# Patient Record
Sex: FEMALE | Employment: OTHER | URBAN - METROPOLITAN AREA
[De-identification: names, ages, dates, MRNs, and addresses within clinical notes are randomized per-mention and may not be internally consistent; named-entity substitution may affect disease eponyms.]

---

## 2023-05-24 ENCOUNTER — ANNUAL EXAM (OUTPATIENT)
Dept: OBGYN CLINIC | Facility: CLINIC | Age: 68
End: 2023-05-24

## 2023-05-24 VITALS
HEIGHT: 64 IN | WEIGHT: 245 LBS | BODY MASS INDEX: 41.83 KG/M2 | SYSTOLIC BLOOD PRESSURE: 138 MMHG | DIASTOLIC BLOOD PRESSURE: 76 MMHG

## 2023-05-24 DIAGNOSIS — Z01.419 WOMEN'S ANNUAL ROUTINE GYNECOLOGICAL EXAMINATION: Primary | ICD-10-CM

## 2023-05-24 DIAGNOSIS — Z78.0 MENOPAUSE: ICD-10-CM

## 2023-05-24 RX ORDER — AMLODIPINE BESYLATE 5 MG/1
5 TABLET ORAL 2 TIMES DAILY
COMMUNITY

## 2023-05-24 RX ORDER — MELOXICAM 15 MG/1
15 TABLET ORAL DAILY
COMMUNITY
Start: 2023-05-18

## 2023-05-24 RX ORDER — ROSUVASTATIN CALCIUM 10 MG/1
1 TABLET, COATED ORAL DAILY
COMMUNITY
Start: 2023-01-03

## 2023-05-24 RX ORDER — LOSARTAN POTASSIUM AND HYDROCHLOROTHIAZIDE 12.5; 1 MG/1; MG/1
1 TABLET ORAL DAILY
COMMUNITY

## 2023-05-24 NOTE — PROGRESS NOTES
"Herbie Moffett is a 69 y o  female who presents for annual well woman exam      Move from [address] [place] [address]      History of abnormal Pap smear: no  Family history of uterine or ovarian cancer: yes - PGM uterine    Family history of breast cancer: yes - SISTER    Menstrual History:  OB History        0    Para   0    Term   0       0    AB   0    Living   0       SAB   0    IAB   0    Ectopic   0    Multiple   0    Live Births   0                  No LMP recorded  Patient is postmenopausal        The following portions of the patient's history were reviewed and updated as appropriate: allergies, current medications, past family history, past medical history, past social history, past surgical history and problem list     Review of Systems  Review of Systems   Constitutional: Negative for activity change, appetite change, chills, fatigue and fever  Respiratory: Negative for apnea, cough, chest tightness and shortness of breath  Cardiovascular: Negative for chest pain, palpitations and leg swelling  Gastrointestinal: Negative for abdominal pain, constipation, diarrhea, nausea and vomiting  Genitourinary: Negative for difficulty urinating, dysuria, flank pain, frequency, hematuria and urgency  Mild ANASTASIIA   Neurological: Negative for dizziness, seizures, syncope, light-headedness, numbness and headaches  Psychiatric/Behavioral: Negative for agitation and confusion        Bladder diet and Kegel exercises discussed    Objective      /76 (BP Location: Left arm, Patient Position: Sitting, Cuff Size: Adult)   Ht 5' 4\" (1 626 m)   Wt 111 kg (245 lb)   BMI 42 05 kg/m²     Physical Exam  OBGyn Exam     General:   alert and oriented, in no acute distress, alert, appears stated age and cooperative   Heart: regular rate and rhythm, S1, S2 normal, no murmur, click, rub or gallop and murmur on exam    Lungs: clear to auscultation bilaterally   Abdomen: soft, non-tender, without masses or " organomegaly   Vulva: normal   Vagina: normal mucosa, normal discharge   Cervix: no cervical motion tenderness and no lesions   Uterus: normal size   Adnexa:  Breast Exam:  normal adnexa  breasts appear normal, no suspicious masses, no skin or nipple changes or axillary nodes  Assessment      @well woman@   78-year-old female  Annual exam  Mild ANASTASIIA   CHTN   PRE DM  Benign thyroid nodule  Sister had breast CA  To be tested fro BRCA  Plan   Pap/HPV  Diet/exercise  Calcium/vitamin D  Mammogram  Due for colonoscopy  Bone scan DEXA  Return to office for annual exam   All questions answered  There are no Patient Instructions on file for this visit

## 2023-06-08 ENCOUNTER — NEW PATIENT (OUTPATIENT)
Dept: URBAN - METROPOLITAN AREA CLINIC 6 | Facility: CLINIC | Age: 68
End: 2023-06-08

## 2023-06-08 DIAGNOSIS — H25.812: ICD-10-CM

## 2023-06-08 DIAGNOSIS — Z83.518: ICD-10-CM

## 2023-06-08 DIAGNOSIS — H40.013: ICD-10-CM

## 2023-06-08 DIAGNOSIS — E11.9: ICD-10-CM

## 2023-06-08 DIAGNOSIS — Z96.1: ICD-10-CM

## 2023-06-08 PROCEDURE — 99204 OFFICE O/P NEW MOD 45 MIN: CPT

## 2023-06-08 ASSESSMENT — TONOMETRY
OD_IOP_MMHG: 12
OS_IOP_MMHG: 12

## 2023-06-08 ASSESSMENT — VISUAL ACUITY
OS_PH: 20/30-1
OD_CC: 20/30-1
OU_CC: J1+
OS_CC: 20/70

## 2023-07-27 ENCOUNTER — FOLLOW UP (OUTPATIENT)
Dept: URBAN - METROPOLITAN AREA CLINIC 6 | Facility: CLINIC | Age: 68
End: 2023-07-27

## 2023-07-27 DIAGNOSIS — Z96.1: ICD-10-CM

## 2023-07-27 DIAGNOSIS — H25.812: ICD-10-CM

## 2023-07-27 DIAGNOSIS — H40.013: ICD-10-CM

## 2023-07-27 DIAGNOSIS — E11.9: ICD-10-CM

## 2023-07-27 DIAGNOSIS — Z83.518: ICD-10-CM

## 2023-07-27 PROCEDURE — 92133 CPTRZD OPH DX IMG PST SGM ON: CPT

## 2023-07-27 PROCEDURE — 92015 DETERMINE REFRACTIVE STATE: CPT

## 2023-07-27 PROCEDURE — 92012 INTRM OPH EXAM EST PATIENT: CPT

## 2023-07-27 ASSESSMENT — VISUAL ACUITY
OS_PH: 20/30
OD_CC: 20/25
OS_CC: 20/60

## 2023-07-27 ASSESSMENT — TONOMETRY
OD_IOP_MMHG: 8
OS_IOP_MMHG: 13

## 2024-03-08 ENCOUNTER — TELEPHONE (OUTPATIENT)
Age: 69
End: 2024-03-08

## 2024-03-08 NOTE — TELEPHONE ENCOUNTER
----- Message from Pierce Griffin MD sent at 3/8/2024 12:39 PM EST -----  Scattered density noted, overall reassuring mammogram recommend routine screening mammogram of both breast in 1 year

## 2024-04-17 ENCOUNTER — OFFICE VISIT (OUTPATIENT)
Dept: OBGYN CLINIC | Facility: CLINIC | Age: 69
End: 2024-04-17
Payer: COMMERCIAL

## 2024-04-17 ENCOUNTER — HOSPITAL ENCOUNTER (OUTPATIENT)
Dept: RADIOLOGY | Facility: HOSPITAL | Age: 69
Discharge: HOME | End: 2024-04-17
Attending: ORTHOPAEDIC SURGERY
Payer: COMMERCIAL

## 2024-04-17 VITALS
DIASTOLIC BLOOD PRESSURE: 68 MMHG | HEART RATE: 95 BPM | SYSTOLIC BLOOD PRESSURE: 127 MMHG | WEIGHT: 232 LBS | HEIGHT: 64 IN | BODY MASS INDEX: 39.61 KG/M2

## 2024-04-17 DIAGNOSIS — G89.29 CHRONIC PAIN OF LEFT KNEE: ICD-10-CM

## 2024-04-17 DIAGNOSIS — E11.69 DYSLIPIDEMIA DUE TO TYPE 2 DIABETES MELLITUS  (HCC): ICD-10-CM

## 2024-04-17 DIAGNOSIS — E78.5 DYSLIPIDEMIA DUE TO TYPE 2 DIABETES MELLITUS  (HCC): ICD-10-CM

## 2024-04-17 DIAGNOSIS — M25.562 CHRONIC PAIN OF LEFT KNEE: ICD-10-CM

## 2024-04-17 DIAGNOSIS — M17.12 PRIMARY OSTEOARTHRITIS OF LEFT KNEE: Primary | ICD-10-CM

## 2024-04-17 DIAGNOSIS — M25.562 LEFT KNEE PAIN, UNSPECIFIED CHRONICITY: ICD-10-CM

## 2024-04-17 PROCEDURE — 99204 OFFICE O/P NEW MOD 45 MIN: CPT | Performed by: ORTHOPAEDIC SURGERY

## 2024-04-17 PROCEDURE — 73562 X-RAY EXAM OF KNEE 3: CPT

## 2024-04-17 RX ORDER — CHLORHEXIDINE GLUCONATE 4 G/100ML
SOLUTION TOPICAL DAILY PRN
OUTPATIENT
Start: 2024-04-17

## 2024-04-17 RX ORDER — MULTIVIT-MIN/IRON FUM/FOLIC AC 7.5 MG-4
1 TABLET ORAL DAILY
Qty: 30 TABLET | Refills: 0 | Status: SHIPPED | OUTPATIENT
Start: 2024-04-17

## 2024-04-17 RX ORDER — ACETAMINOPHEN 325 MG/1
975 TABLET ORAL ONCE
OUTPATIENT
Start: 2024-04-17 | End: 2024-04-17

## 2024-04-17 RX ORDER — ASCORBIC ACID 500 MG
500 TABLET ORAL 2 TIMES DAILY
Qty: 60 TABLET | Refills: 1 | Status: SHIPPED | OUTPATIENT
Start: 2024-04-17

## 2024-04-17 RX ORDER — VALSARTAN AND HYDROCHLOROTHIAZIDE 320; 12.5 MG/1; MG/1
TABLET, FILM COATED ORAL
COMMUNITY
Start: 2024-04-05

## 2024-04-17 RX ORDER — SODIUM CHLORIDE, SODIUM LACTATE, POTASSIUM CHLORIDE, CALCIUM CHLORIDE 600; 310; 30; 20 MG/100ML; MG/100ML; MG/100ML; MG/100ML
125 INJECTION, SOLUTION INTRAVENOUS CONTINUOUS
OUTPATIENT
Start: 2024-04-17

## 2024-04-17 RX ORDER — FOLIC ACID 1 MG/1
1 TABLET ORAL DAILY
Qty: 30 TABLET | Refills: 1 | Status: SHIPPED | OUTPATIENT
Start: 2024-04-17

## 2024-04-17 RX ORDER — CHLORHEXIDINE GLUCONATE ORAL RINSE 1.2 MG/ML
15 SOLUTION DENTAL ONCE
OUTPATIENT
Start: 2024-04-17 | End: 2024-04-17

## 2024-04-17 RX ORDER — SEMAGLUTIDE 0.68 MG/ML
0.25 INJECTION, SOLUTION SUBCUTANEOUS
COMMUNITY
Start: 2024-02-27

## 2024-04-17 RX ORDER — FERROUS SULFATE 324(65)MG
324 TABLET, DELAYED RELEASE (ENTERIC COATED) ORAL
Qty: 60 TABLET | Refills: 1 | Status: SHIPPED | OUTPATIENT
Start: 2024-04-17

## 2024-04-17 RX ORDER — CELECOXIB 200 MG/1
200 CAPSULE ORAL DAILY
Qty: 30 CAPSULE | Refills: 1 | Status: SHIPPED | OUTPATIENT
Start: 2024-04-17

## 2024-04-17 RX ORDER — CELECOXIB 200 MG/1
200 CAPSULE ORAL DAILY PRN
Status: CANCELLED | OUTPATIENT
Start: 2024-04-17

## 2024-04-17 NOTE — PATIENT INSTRUCTIONS
"Arthritis of the Knee    Arthritis is inflammation of one or more of your joints. Pain, swelling, and stiffness are the primary symptoms of arthritis. Any joint in the body may be affected by the disease, but it is particularly common in the knee.    Knee arthritis can make it hard to do many everyday activities, such as walking or climbing stairs. It is a major cause of lost work time and a serious disability for many people.    The most common types of arthritis are osteoarthritis and rheumatoid arthritis, but there are more than 100 different forms. While arthritis is mainly an adult disease, some forms affect children.    Although there is no cure for arthritis, there are many treatment options available to help manage pain and keep people staying active.    Anatomy  The knee is the largest and strongest joint in your body. It is made up of the lower end of the femur (thighbone), the upper end of the tibia (shinbone), and the patella (kneecap). The ends of the three bones that form the knee joint are covered with articular cartilage, a smooth, slippery substance that protects and cushions the bones as you bend and straighten your knee.    Two wedge-shaped pieces of cartilage called meniscus act as \"shock absorbers\" between your thighbone and shinbone. They are tough and rubbery to help cushion the joint and keep it stable.    The knee joint is surrounded by a thin lining called the synovial membrane. This membrane releases a fluid that lubricates the cartilage and reduces friction.      Normal knee anatomy. The knee is made up of bones, cartilage, ligaments, and tendons.     Description  The major types of arthritis that affect the knee are osteoarthritis, rheumatoid arthritis, and posttraumatic arthritis.    Osteoarthritis  Osteoarthritis is the most common form of arthritis in the knee. It is a degenerative, wear-and-tear type of arthritis that occurs most often in people 50 years of age and older, although it " may occur in younger people, too.    In osteoarthritis, the cartilage in the knee joint gradually wears away. As the cartilage wears away, it becomes frayed and rough, and the protective space between the bones decreases. This can result in bone rubbing on bone and produce painful bone spurs.    Osteoarthritis usually develops slowly, and the pain it causes worsens over time.    Osteoarthritis often results in bone rubbing on bone. Bone spurs are a common feature of this form of arthritis.     Watch: Osteoarthritis of the Knee Animation    Rheumatoid Arthritis  Rheumatoid arthritis is a chronic disease that attacks multiple joints throughout the body, including the knee joint. It is symmetrical, meaning that it usually affects the same joint on both sides of the body.  In rheumatoid arthritis, the synovial membrane that covers the knee joint begins to swell. This results in knee pain and stiffness.    Rheumatoid arthritis is an autoimmune disease. This means that the immune system attacks its own tissues. The immune system damages normal tissue (such as cartilage and ligaments) and softens the bone.    Posttraumatic Arthritis  Posttraumatic arthritis is form of arthritis that develops after an injury to the knee. For example, a broken bone may damage the joint surface and lead to arthritis years after the injury. Meniscal tears and ligament injuries can cause instability and additional wear on the knee joint which, over time, can result in arthritis.    Symptoms  A knee joint affected by arthritis may be painful and inflamed. Generally, the pain develops gradually over time, although sudden onset is also possible. There are other symptoms, as well:  The joint may become stiff and swollen, making it difficult to bend and straighten the knee.  Pain and swelling may be worse in the morning, or after sitting or resting.  Vigorous activity may cause pain to flare up.  Loose fragments of cartilage and other tissue can  interfere with the smooth motion of joints. The knee may lock or stick during movement. It may creak, click, snap, or make a grinding noise (crepitus).  Pain may cause a feeling of weakness or buckling in the knee.  Many people with arthritis note increased joint pain with changes in the weather.    Doctor Examination  During your appointment, your doctor will talk with you about your symptoms and medical history, conduct a physical examination, and possibly order diagnostic tests, such as X-rays or blood tests.    Physical Examination  During the physical examination, your doctor will look for:  Joint swelling, warmth, or redness  Tenderness around the knee  Range of passive (assisted) and active (self-directed) motion  Instability of the joint  Crepitus (a grating sensation inside the joint) with movement  Pain when weight is placed on the knee  Problems with your gait (the way you walk)  Any signs of injury to the muscles, tendons, and ligaments surrounding the knee  Involvement of other joints (an indication of rheumatoid arthritis)    Imaging Tests  X-rays. These imaging tests provide detailed pictures of dense structures, such as bone. They can help distinguish among various forms of arthritis. X-rays of an arthritic knee may show a narrowing of the joint space, changes in the bone, and the formation of bone spurs (osteophytes).  Other tests. Occasionally, a magnetic resonance imaging (MRI) scan or a computerized tomography (CT) scan may be needed to determine the condition of the bone and soft tissues of your knee.      (Left) In this X-ray of a normal knee, the space between the bones indicates healthy cartilage (arrows). (Right) This X-ray of an arthritic knee shows severe loss of joint space.     Laboratory Tests  Your doctor may also recommend blood tests to determine which type of arthritis you have. With some types of arthritis, including rheumatoid arthritis, blood tests will help with a proper  "diagnosis.    Related Articles  TREATMENT  Total Knee Replacement  TREATMENT  Platelet-Rich Plasma (PRP)  RECOVERY  Activities After Total Knee Replacement  TREATMENT  Viscosupplementation Treatment for Knee Arthritis    Treatment  There is no cure for arthritis but there are a number of treatments that may help relieve the pain and disability it can cause.    Nonsurgical Treatment  As with other arthritic conditions, initial treatment of arthritis of the knee is nonsurgical. Your doctor may recommend a range of treatment options.    Lifestyle modifications. Some changes in your daily life can protect your knee joint and slow the progress of arthritis.  Minimize activities that aggravate the condition, such as climbing stairs.  Exercise is recommended for osteoarthritis to improve pain and function. Switching from high-impact activities (like jogging or tennis) to lower impact activities (like swimming or cycling) will enable you to be active while putting less stress on your knee. Balance, agility, and coordination exercises, combined with traditional exercise, may help to improve function and walking speed.  Losing weight can reduce stress on the knee joint, resulting in less pain and increased function.    Physical therapy. Specific exercises can help increase range of motion and flexibility, as well as help strengthen the muscles in your leg. Your doctor or a physical therapist can help develop an individualized exercise program that meets your needs and lifestyle.    Assistive devices. Using devices such as a cane, or wearing a brace or knee sleeve can be helpful. A brace assists with stability and function, and it may be especially helpful if the arthritis is centered on one side of the knee. There are two types of braces that are often used for knee arthritis: An \"\" brace shifts weight away from the affected portion of the knee, while a \"support\" brace helps support the entire knee load. Placing " "wedges in your shoe is not recommended for relieving knee discomfort.    Other remedies. Applying heat or ice, or wearing elastic bandages to provide support to the knee may provide some relief from pain.    Medications. Several types of drugs are useful in treating arthritis of the knee. Because people respond differently to medications, your doctor will work closely with you to determine the medications and dosages that are safe and effective for you.  Over-the-counter, non-narcotic pain relievers and anti-inflammatory medications are usually the first choice of therapy for arthritis of the knee. Like all medications, over-the-counter pain relievers can cause side effects and interact with other medications you are taking. Be sure to discuss potential side effects with your doctor.  Another type of pain reliever is a nonsteroidal anti-inflammatory drug, or NSAID (pronounced \"en-said\"). NSAID's, such as ibuprofen and naproxen, are available both over-the-counter and by prescription and in oral and topical (gel) forms. Oral NSAID's are recommended to improve pain and function in people with knee osteoarthritis. However, NSAID's should be used with caution, or avoided, in people with certain health conditions, such as coronary artery disease, congestive heart failure, and chronic kidney disease. Talk to your doctor about whether NSAID's are right for you. Acetaminophen is a simple, over-the-counter pain reliever that can be effective in reducing arthritis pain, especially for people who cannot tolerate traditional NSAID's.   A CORONEL-2 inhibitor is a special type of NSAID that may cause fewer gastrointestinal side effects. Common brand names of CORONEL-2 inhibitors include Celebrex (celecoxib) and Mobic (Meloxicam, which is a partial CORONEL-2 inhibitor). A CORONEL-2 inhibitor reduces pain and inflammation so that you can function better. If you are taking a CORONEL-2 inhibitor, you should not use a traditional NSAID (prescription or " over-the-counter). Be sure to tell your doctor if you have had a heart attack, stroke, angina, blood clot, hypertension, or if you are sensitive to aspirin, sulfa drugs, or other NSAID's.  Topical NSAID's are also available for the treatment of knee pain. There is a strong recommendation to try this type of treatment for pain control, especially if you are unable to tolerate oral NSAID's.  The use of oral narcotic medications, including opioids, should be avoided. This class of medications is not effective for the treatment of pain due to knee arthritis.  Corticosteroids (also known as cortisone) are powerful anti-inflammatory agents that can be injected into the joint. These injections can provide pain relief and reduce inflammation; however, the effects do not last indefinitely. Your doctor may recommend limiting the number of injections to three or four per year, per joint, due to possible side effects. In some cases, pain and swelling may flare immediately after the injection, and the potential exists for long-term joint damage or infection. With frequent repeated injections, or injections over an extended period of time, joint damage can actually increase rather than decrease.  Disease-modifying anti-rheumatic drugs (DMARDs) are used to slow the progression of rheumatoid arthritis. Drugs like methotrexate, sulfasalazine, and hydroxychloroquine are commonly prescribed. In addition, biologic DMARDs like etanercept (Enbrel) and adalimumab (Humira) may reduce the body's overactive immune response. Because there are many different drugs today for rheumatoid arthritis, a rheumatology specialist is often required to effectively manage medications.  Glucosamine and chondroitin sulfate, substances found naturally in joint cartilage, can be taken as dietary supplements. Although patient reports indicate that these supplements may relieve pain, there is no evidence to support the use of glucosamine and chondroitin sulfate  to decrease or reverse the progression of arthritis. In addition, the [place] Food and Drug Administration does not test dietary supplements before they are sold to consumers. These compounds may cause side effects, as well as negative interactions with other medications. Always consult your doctor before taking dietary supplements.    Alternative therapies. Many alternative forms of therapy are unproven, but may be helpful to try, provided you find a qualified practitioner and keep your doctor informed of your decision. Alternative therapies to treat pain include the use of acupuncture, magnetic pulse therapy, platelet-rich plasma, and stem cell injections.    Acupuncture uses fine needles to stimulate specific body areas to relieve pain or temporarily numb an area. Although it is used in many parts of the world and evidence suggests that it can help ease the pain of arthritis, there are few scientific studies of its effectiveness. Be sure your acupuncturist is certified, and do not hesitate to ask about his or her sterilization practices.    Magnetic pulse therapy is painless and works by applying a pulsed signal to the knee, which is placed in an electromagnetic field. Like many alternative therapies, magnetic pulse therapy has yet to be proven.  Biologic treatments such as platelet-rich plasma (PRP) and stem cell injections involve taking cells from your own body and re-injecting them into a painful joint.    PRP uses a component of your own blood, platelets, that have been  from your blood, concentrated, and injected into your knee. The platelets contain “growth factors” thought to be helpful in reducing the symptoms of inflammation.  Stem cells are precursor cells that can also be taken from your own body and injected into your knee. Since they are basic cells, they may have potential to grow into new tissue and thus heal damaged joint surfaces.    While both treatments show promise, clinical studies  have yet to confirm their value in treating osteoarthritis.    Surgical Treatment  Your doctor may recommend surgery if your pain from arthritis causes disability and is not relieved with nonsurgical treatment. As with all surgeries, there are some risks and possible complications with different knee procedures. Your doctor will discuss the possible complications with you before your operation.    Arthroscopy. During arthroscopy, doctors use small incisions and thin instruments to diagnose and treat joint problems.    Arthroscopic surgery is not often used to treat arthritis of the knee. In cases where osteoarthritis is accompanied by a degenerative meniscal tear, arthroscopic surgery may be recommended to treat the torn meniscus.    Cartilage grafting (cartilage restoration). Normal, healthy cartilage tissue may be taken from another part of the knee or from a tissue bank to fill a hole in the articular cartilage. This procedure is typically considered only for younger patients who have small areas of cartilage damage.    Synovectomy. The joint lining damaged by rheumatoid arthritis is removed to reduce pain and swelling.    Osteotomy. In a knee osteotomy, either the tibia (shinbone) or femur (thighbone) is cut and then reshaped to relieve pressure on the knee joint. Knee osteotomy is used when you have early-stage osteoarthritis that has damaged just one side of the knee joint. By shifting your weight off the damaged side of the joint, an osteotomy can relieve pain and significantly improve function in your arthritic knee.    Total knee replacement or partial (unicompartmental) knee replacement (arthroplasty). Your doctor will remove the damaged cartilage and bone, and then position new metal or plastic joint surfaces to restore the function of your knee.      (Left) A partial knee replacement is an option when damage is limited to just one part of the knee. (Right) A total knee replacement prosthesis.      Recovery  After any type of surgery for arthritis of the knee, there is a period of recovery. Recovery time and rehabilitation depends on the type of surgery performed.    Your doctor may recommend physical therapy to help you regain strength in your knee and to restore range of motion. Depending upon your procedure, you may need to wear a knee brace, or use crutches or a cane for a time.    In most cases, surgery relieves pain and makes it possible to perform daily activities more easily.    To assist doctors in the nonsurgical management of knee osteoarthritis, the American Academy of [employer] has conducted research to provide some useful guidelines. These are recommendations only and may not apply to every case. For more information: Plain Language Summary - Clinical Practice Guideline - Management of Knee Osteoarthritis        Total Knee Replacement    If your knee is severely damaged by arthritis or injury, it may be hard for you to perform simple activities, such as walking or climbing stairs. You may even begin to feel pain while you are sitting or lying down.  If nonsurgical treatments like medications and using walking supports are no longer helpful, you may want to consider total knee replacement surgery. Joint replacement surgery is a safe and effective procedure to relieve pain, correct leg deformity, and help you resume normal activities.    Knee replacement surgery was first performed in 1968. Since then, improvements in surgical materials and techniques have greatly increased its effectiveness. Total knee replacements are one of the most successful procedures in all of medicine. According to the Agency for Healthcare Research and Quality, in 2017, more than 754,000 knee replacements were performed in the [place].    Whether you have just begun exploring treatment options or have already decided to have total knee replacement surgery, this article will help you understand more  about this valuable procedure.    Anatomy  The knee is the largest joint in the body and having healthy knees is required to perform most everyday activities.    Normal knee anatomy. In a healthy knee, these structures work together to ensure smooth, natural function and movement.      The knee is made up of the lower end of the thighbone (femur), the upper end of the shinbone (tibia), and the kneecap (patella). The ends of these three bones are covered with articular cartilage, a smooth substance that protects the bones and enables them to move easily within the joint.    The menisci are located between the femur and tibia. These C-shaped wedges act as shock absorbers that cushion the joint.    Large ligaments hold the femur and tibia together and provide stability. The long thigh muscles give the knee strength.    All remaining surfaces of the knee are covered by a thin lining called the synovial membrane. This membrane releases a fluid that lubricates the cartilage, reducing friction to nearly zero in a healthy knee.  Normally, all of these components work in harmony. But disease or injury can disrupt this harmony, resulting in pain, muscle weakness, and reduced function.    Cause  The most common cause of chronic knee pain and disability is arthritis. Although there are many types of arthritis, most knee pain is caused by just three types: osteoarthritis, rheumatoid arthritis, and posttraumatic arthritis.  Osteoarthritis. This is an age-related wear and tear type of arthritis. It usually occurs in people 50 years of age and older, but may occur in younger people, too. The cartilage that cushions the bones of the knee softens and wears away. The bones then rub against one another, causing knee pain and stiffness.    Osteoarthritis often results in bone rubbing on bone. Bone spurs are a common feature of this form of arthritis.     Rheumatoid arthritis. This is a disease in which the synovial membrane that  "surrounds the joint becomes inflamed and thickened. This chronic inflammation can damage the cartilage and eventually cause cartilage loss, pain, and stiffness. Rheumatoid arthritis is the most common form of a group of disorders termed \"inflammatory arthritis.\"  Posttraumatic arthritis. This can follow a serious knee injury. Fractures of the bones surrounding the knee or tears of the knee ligaments may damage the articular cartilage over time, causing knee pain and limiting knee function.    Description  A knee replacement (also called knee arthroplasty) might be more accurately termed a knee \"resurfacing\" because only the surface of the bones are replaced.  There are four basic steps to a knee replacement procedure:  Prepare the bone. The damaged cartilage surfaces at the ends of the femur and tibia are removed along with a small amount of underlying bone.  Position the metal implants. The removed cartilage and bone is replaced with metal components that recreate the surface of the joint. These metal parts may be cemented or \"press-fit\" into the bone.  Resurface the patella. The undersurface of the patella (kneecap) is cut and resurfaced with a plastic button. Some surgeons do not resurface the patella, depending upon the case.  Insert a spacer. A medical-grade plastic spacer is inserted between the metal components to create a smooth gliding surface.    (Left) Severe osteoarthritis. (Right) The arthritic cartilage and underlying bone has been removed and resurfaced with metal implants on the femur and tibia. A plastic spacer has been placed in between the implants. The patellar component is not shown for clarity.      Watch: Total Knee Replacement Animation    Is Total Knee Replacement for You?  The decision to have total knee replacement surgery should be a cooperative one between you, your family, your primary care doctor, and your orthopaedic surgeon. Your doctor may refer you to an orthopaedic surgeon for a " thorough evaluation to determine if you might benefit from this surgery.    When Surgery Is Recommended  There are several reasons why your doctor may recommend knee replacement surgery. People who benefit from total knee replacement often have:  Severe knee pain or stiffness that limits everyday activities, including walking, climbing stairs, and getting in and out of chairs.  It may be hard to walk more than a few blocks without significant pain and it may be necessary to use a cane or walker  Moderate or severe knee pain while resting, either day or night  Chronic knee inflammation and swelling that does not improve with rest or medications  Knee deformity -- a bowing in or out of the knee  Failure to substantially improve with other treatments such as anti-inflammatory medications, cortisone injections, lubricating injections, physical therapy, or other surgeries    Total knee replacement may be recommended for patients with bowed knee deformity, like that shown in this clinical photo.     Candidates for Surgery  There are no absolute age or weight restrictions for total knee replacement surgery. [facility] requires a BMI (body mass index) of 40 or below.     Recommendations for surgery are based on a patient's pain and disability, not age. Most patients who undergo total knee replacement are aged 50 to 80, but orthopaedic surgeons evaluate patients individually. Total knee replacements have been performed successfully at all ages, from the young teenager with juvenile arthritis to the elderly patient with degenerative arthritis.    The Orthopaedic Evaluation  An evaluation with an orthopaedic surgeon consists of several components:  Medical history. Your orthopaedic surgeon will gather information about your general health and ask you about the extent of your knee pain and your ability to function.  Physical examination. This will assess knee motion, stability, strength, and overall leg alignment.  X-rays.  These images help to determine the extent of damage and deformity in your knee.  Other tests. Occasionally blood tests or advanced imaging, such as a magnetic resonance imaging (MRI) scan, may be needed to determine the condition of the bone and soft tissues of your knee.    (Left) In this X-ray of a normal knee, the space between the bones indicates healthy cartilage (arrows). (Right) This X-ray of a knee that has become bowed from arthritis shows severe loss of joint space (arrows).     Your orthopaedic surgeon will review the results of your evaluation with you and discuss whether total knee replacement is the best method to relieve your pain and improve your function. Other treatment options -- including medications, injections, physical therapy, or other types of surgery -- will also be considered and discussed.    In addition, your orthopaedic surgeon will explain the potential risks and complications of total knee replacement, including those related to the surgery itself and those that can occur over time after your surgery.    Deciding to Have Knee Replacement Surgery  Realistic Expectations  An important factor in deciding whether to have total knee replacement surgery is understanding what the procedure can and cannot do.  Most people who have total knee replacement surgery experience a dramatic reduction of knee pain and a significant improvement in the ability to perform common activities of daily living. But total knee replacement will not allow you to do more than you could before you developed arthritis.    With normal use and activity, every knee replacement implant begins to wear in its plastic spacer. Excessive activity or weight may speed up this normal wear and may cause the knee replacement to loosen and become painful. Therefore, most surgeons advise against high-impact activities such as running, jogging, jumping, or other high-impact sports for the rest of your life after surgery.  Realistic  activities following total knee replacement include unlimited walking, swimming, golf, driving, light hiking, biking, ballroom dancing, and other low-impact sports.    With appropriate activity modification, knee replacements can last for many years.    Possible Complications of Surgery  The complication rate following total knee replacement is low. Serious complications, such as a knee joint infection, occur in fewer than 2% of patients. Major medical complications such as heart attack or stroke occur even less frequently. Chronic illnesses may increase the potential for complications. Although uncommon, when these complications occur, they can prolong or limit full recovery.  Discuss your concerns thoroughly with your orthopaedic surgeon prior to surgery.    Infection. Infection may occur in the wound or deep around the prosthesis. It may happen within days or weeks of your surgery. It may even occur years later. Minor infections in the wound area are generally treated with antibiotics. Major or deep infections may require more surgery and removal of the prosthesis. Any infection in your body can spread to your joint replacement.    Blood clots. Blood clots in the leg veins are one of the most common complications of knee replacement surgery. These clots can be life-threatening if they break free and travel to your lungs. Your orthopaedic surgeon will outline a prevention program, which may include periodic elevation of your legs, lower leg exercises to increase circulation, support stockings, and medication to thin your blood.      Blood clots may form in one of the deep veins of the body.   While blood clots can occur in any deep vein, they most commonly form in the veins of the pelvis, calf, or thigh.    Implant problems. Although implant designs and materials, as well as surgical techniques, continue to advance, implant surfaces may wear down and the components may loosen. Additionally, although an average of  115° of motion is generally anticipated after surgery, scarring of the knee can occasionally occur, and motion may be more limited, particularly in patients with limited motion before surgery.    Continued pain. A small number of patients continue to have pain after a knee replacement. This complication is rare, however, and most patients experience excellent pain relief following knee replacement.    Neurovascular injury. While rare, injury to the nerves or blood vessels around the knee can occur during surgery.    Preparing for Surgery  Medical Evaluation  If you decide to have total knee replacement surgery, your orthopaedic surgeon may ask you to schedule a complete physical examination with your doctor several weeks before the operation. This is needed to make sure you are healthy enough to have the surgery and complete the recovery process. Many patients with chronic medical conditions, like heart disease, may also be evaluated by a specialist, such as a cardiologist, before the surgery.    Tests  Several tests, such as blood and urine samples, and an electrocardiogram, may be needed to help your orthopaedic surgeon plan your surgery.    Medications  Tell your orthopaedic surgeon about the medications you are taking. He or she will tell you which medications you should stop taking and which you should continue to take before surgery.    Dental Evaluation  Although the incidence of infection after knee replacement is very low, an infection can occur if bacteria enter your bloodstream. To reduce the risk of infection, major dental procedures (such as tooth extractions and periodontal work) should be completed before your total knee replacement surgery.    Urinary Evaluations  People with a history of recent or frequent urinary infections should have a urological evaluation before surgery. Older men with prostate disease should consider completing required treatment before undertaking knee replacement  surgery.    Social Planning  Although you will be able to walk with a cane, crutches, or a walker soon after surgery, you will need help for several weeks with such tasks as cooking, shopping, bathing, and doing laundry.  If you live alone, a  or a discharge planner at the hospital can help you make advance arrangements to have someone assist you at home. They also can help you arrange for a short stay in an extended care facility during your recovery if this option works best for you.    Home Planning  Several modifications can make your home easier to navigate during your recovery. The following items may help with daily activities:  Safety bars or a secure handrail in your shower or bath  Secure handrails along your stairways  A stable chair for your early recovery with a firm seat cushion (and a height of 18 to 20 inches), a firm back, two arms, and a footstool for intermittent leg elevation  A toilet seat riser with arms, if you have a low toilet  A stable shower bench or chair for bathing  Removing all loose carpets and cords  A temporary living space on the same floor because walking up or down stairs will be more difficult during your early recovery  Get more tips on preparing your home for your total knee replacement in this infographic (click on image for full infographic).        Your Surgery  You will either be admitted to the hospital on the day of your surgery, or you will go home the same day. The plan to either be admitted or to go home should be discussed with your surgeon prior to your operation.    Anesthesia  Upon arrival at the hospital or surgery center, you will be evaluated by a member of the anesthesia team. The most common types of anesthesia are general anesthesia (you are put to sleep) or spinal, epidural, or regional nerve block anesthesia (you are awake but your body is numb from the waist down). The anesthesia team, with your input, will determine which type of anesthesia  will be best for you.    Procedure  The surgical procedure usually takes from 1 to 2 hours. Your orthopaedic surgeon will remove the damaged cartilage and bone, and then position the new metal and plastic implants to restore the alignment and function of your knee.      Different types of knee implants are used to meet each patient's individual needs.       (Left) An X-ray of a severely arthritic knee. (Right) The X-ray appearance of a total knee replacement. Note that the plastic spacer inserted between the components does not show up in an X-ray.       After surgery, you will be moved to the recovery room, where you will remain for several hours while your recovery from anesthesia is monitored. After you wake up, you will be taken to your hospital room or discharged to home.     Your Hospital Stay  If you are admitted to the hospital, you will most likely stay from one to three days.    Pain Management  After surgery, you will feel some pain. This is a natural part of the healing process. Your doctor and nurses will work to reduce your pain, which can help you recover from surgery faster.  Medications are often prescribed for short-term pain relief after surgery. Many types of medicines are available to help manage pain, including opioids, nonsteroidal anti-inflammatory drugs (NSAID's), acetaminophen, and local anesthetics. Your doctor may use a combination of these medications to improve pain relief, as well as minimize the need for opioids.    Be aware that although opioids help relieve pain after surgery, they are a narcotic and can be addictive. Opioid dependency and overdose have become  critical public health issues in the [place] It is important to use opioids only as directed by your doctor. As soon as your pain begins to improve, stop taking opioids. Talk to your doctor if your pain has not begun to improve within a few days of your surgery.    Blood Clot Prevention  Your orthopaedic surgeon may prescribe  "one or more measures to prevent blood clots and decrease leg swelling. These may include special support hose, inflatable leg coverings (compression boots), and blood thinners.    Foot and ankle movement is also encouraged immediately following surgery to increase blood flow in your leg muscles to help prevent leg swelling and blood clots.    Physical Therapy  Most patients can begin exercising their knee hours after surgery. A physical therapist will teach you specific exercises to strengthen your leg and restore knee movement to allow walking and other normal daily activities soon after your surgery.    To restore movement in your knee and leg, your surgeon may use a knee support that slowly moves your knee while you are in bed. The device is called a continuous passive motion (CPM) exercise machine. Some surgeons believe that a CPM machine decreases leg swelling by elevating your leg and improves your blood circulation by moving the muscles of your leg, but there is no evidence that these machines improve outcomes.      A continuous passive motion (CPM) machine.     Preventing Pneumonia  It is common for patients to have shallow breathing in the early postoperative period. This is usually due to the effects of anesthesia, pain medications, and increased time spent in bed. This shallow breathing can lead to a partial collapse of the lungs (termed \"atelectasis\"), which can make patients susceptible to pneumonia. To help prevent this, it is important to take frequent deep breaths. Your nurse may provide a simple breathing apparatus called a spirometer to encourage you to take deep breaths.    Your Recovery at Home  The success of your surgery will depend largely on how well you follow your orthopaedic surgeon's instructions at home during the first few weeks after surgery.    Wound Care  You will have stitches or staples running along your wound or a suture beneath your skin on the front of your knee. The stitches " or staples will be removed several weeks after surgery. A suture beneath your skin will not require removal.    Avoid soaking the wound in water until it has thoroughly sealed and dried. You may continue to bandage the wound to prevent irritation from clothing or support stockings.    Diet  Some loss of appetite is common for several weeks after surgery. A balanced diet, often with an iron supplement, is important to help your wound heal and to restore muscle strength.    Activity  Exercise is a critical component of home care, particularly during the first few weeks after surgery. You should be able to resume most normal activities of daily living within 3 to 6 weeks following surgery. Some pain with activity and at night is common for several weeks after surgery.    Your activity program should include:  A graduated walking program -- initially in your home and later outside -- to slowly increase your mobility  Resuming other normal household activities, such as sitting, standing, and climbing stairs  Specific exercises several times a day to restore movement and strengthen your knee. You probably will be able to perform the exercises without help, but you may have a physical therapist help you at home or in a therapy center the first few weeks after surgery.      Physical therapy will help restore movement and function.  [employer] © 2011     You will most likely be able to resume driving when your knee bends enough that you can enter and sit comfortably in your car, and when your muscle control provides adequate reaction time for braking and acceleration. Most people resume driving approximately 4 to 6 weeks after surgery.    Avoiding Problems After Surgery  Recognizing the Signs of a Blood Clot  Follow your orthopaedic surgeon's instructions carefully to reduce the risk of blood clots developing during the first several weeks of your recovery. They may recommend that you continue taking the blood thinning  medication you started in the hospital. Notify your doctor immediately if you develop any of the following warning signs.    Warning signs of blood clots. The warning signs of possible blood clots in your leg include:  Increasing pain in your calf  Tenderness or redness above or below your knee  New or increasing swelling in your calf, ankle, and foot  Warning signs of pulmonary embolism. The warning signs that a blood clot has traveled to your lung include:  Sudden shortness of breath  Sudden onset of chest pain  Localized chest pain with coughing    Preventing Infection  A common cause of infection following total knee replacement surgery is from bacteria that enter the bloodstream during dental procedures, urinary tract infections, or skin infections. These bacteria can lodge around your knee replacement and cause an infection.    After knee replacement, patients with certain risk factors may need to take antibiotics prior to dental work, including dental cleanings, or before any surgical procedure that could allow bacteria to enter the bloodstream. Your orthopaedic surgeon will discuss with you whether you need to take preventive antibiotics before dental procedures.    Warning signs of infection. Notify your doctor immediately if you develop any of the following signs of a possible knee replacement infection:  Persistent fever (higher than 100°F orally)  Chills  Increasing redness, tenderness, or swelling of the knee wound  Drainage from the knee wound  Increasing knee pain with both activity and rest    Avoiding Falls  A fall during the first few weeks after surgery can damage your new knee and may result in a need for further surgery. Stairs are a particular hazard until your knee is strong and mobile. You should use a cane, crutches, a walker, or handrails, or have someone to help you until you have improved your balance, flexibility, and strength.    Your surgeon and physical therapist will help you decide  what assistive aides will be required following surgery and when those aides can safely be discontinued.    Outcomes  How Your New Knee Is Different  Improvement of knee motion is a goal of total knee replacement, but restoration of full motion is uncommon. The motion of your knee replacement after surgery can be predicted by the range of motion you have in your knee before surgery. Most patients can expect to be able to almost fully straighten the replaced knee and to bend the knee sufficiently to climb stairs and get in and out of a car. Kneeling is sometimes uncomfortable, but it is not harmful.    Most people feel some numbness in the skin around their incisions. You also may feel some stiffness, particularly with excessive bending activities.    Most people also feel or hear some clicking of the metal and plastic with knee bending or walking. This is normal. These differences often diminish with time and most patients find them to be tolerable when compared with the pain and limited function they experienced prior to surgery.    Your new knee may activate metal detectors required for security in airports and some buildings. Tell the  about your knee replacement if the alarm is activated.    Protecting Your Knee Replacement  After surgery, make sure you also do the following:  Participate in regular light exercise programs to maintain proper strength and mobility of your new knee.  Take special precautions to avoid falls and injuries. If you break a bone in your leg, you may require more surgery.  Let your dentist know that you have a knee replacement. Talk with your orthopaedic surgeon about whether you need to take antibiotics prior to dental procedures.  See your orthopaedic surgeon periodically for routine follow-up examinations and X-rays. Your surgeon will talk with you about the frequency and timing of these visits.    Extending the Life of Your Knee Implant  Currently, more than 90% of  modern total knee replacements are still functioning well 15 years after the surgery. Following your orthopaedic surgeon's instructions after surgery and taking care to protect your knee replacement and your general health are important ways you can contribute to the final success of your surgery.    To assist doctors in the surgical management of osteoarthritis of the knee, the [employer] has conducted research to provide some useful guidelines. These are recommendations only and may not apply to every case. For more information: Surgical Management of Osteoarthritis of the Knee - Clinical Practice Guideline (CPG)  [employer] (aaos.org)

## 2024-04-17 NOTE — PROGRESS NOTES
Assessment:   Diagnosis ICD-10-CM Associated Orders   1. Primary osteoarthritis of left knee  M17.12 Case request operating room: ARTHROPLASTY KNEE TOTAL     ascorbic acid (VITAMIN C) 500 MG tablet     ferrous sulfate 324 (65 Fe) mg     folic acid (FOLVITE) 1 mg tablet     Comprehensive metabolic panel     Hemoglobin A1C W/EAG Estimation     CBC and differential     Protime-INR     APTT     Type and screen     Ambulatory referral to Family Practice     Ambulatory referral to surgical optimization     Ambulatory referral to Physical Therapy     EKG 12 lead     Multiple Vitamins-Minerals (multivitamin with minerals) tablet     Case request operating room: ARTHROPLASTY KNEE TOTAL      2. Chronic pain of left knee  M25.562 XR knee 3 vw left non injury    G89.29       3. Body mass index (BMI) 40.0-44.9, adult ([facility])  Z68.41       4. Dyslipidemia due to type 2 diabetes mellitus  ([facility])  E11.69     E78.5           Plan:  The patient's x-rays were reviewed today.  Discussed treatment options moving forward including over the counter analgesics and topical creams, gentle range of motion, physical therapy, corticosteroid injections, viscosupplementation, total knee arthoplasty   At this time we will not try repeat injections as the patient's last injections were not effective.   The patient was referred to physical therapy for pre-hab prior to right total knee arthroplasty.  The benefits and purpose of the operations and/or procedure have been explained to me in language I understand by Dr. Yepez well as the risks and benefits, alternatives and complications pertaining to the above procedure/surgery which include but is not limited to: infections, deeps vein thrombosis, blood clots to the lungs, wound healing problems, complications from extensive blood loss, including shock, possible death, continued pain, fracture, vascular or nerve injury, potential need for further surgery/revision, persistent pain/stiffness/instability,  failure of hardware,heart attack, stroke and not obtaining results patient used.   Will plan for same-day surgery  Aspirin 81 mg p.o. twice daily for 5 weeks for DVT prophylaxis  The patient was informed of post-operative expectations.   The patient was provided information regarding knee arthritis and total knee arthroplasty in her after visit summary.   I will see the patient back 2 weeks post-operatively       To do next visit:  Return for f/u, L, Knee.    The above stated was discussed in layman's terms and the patient expressed understanding.  All questions were answered to the patient's satisfaction.       Scribe Attestation      I,:  Chel Ramirez am acting as a scribe while in the presence of the attending physician.:       I,:  Miracle Yepez MD personally performed the services described in this documentation    as scribed in my presence.:               Subjective:   Noris Heck is a 68 y.o. female who presents for initial evaluation of left knee pain that has been present for years and has been worsening over the past 6 months. The patient reports she previously had injections to her knee. She has received both corticosteroid injections and viscosupplementation. Both injection therapies were losing efficacy the last time she received these injections which was approximately 1.5 years ago. The patient reports her most recent provider retired. The patient has occasional locking of her knee. Pain is managed with Advil, Voltaren gel or ice as needed with minimal improvement in symptoms. The patient has swelling into her knee. The patient denies history of blood clots. The patient has a history of right total knee arthroplasty performed approximately 18 years ago by Dr. Cox at [facility]. The patient is a diabetic. Her hemoglobin A1c is well managed at 6.5 on 2/20/24. The patient has been losing weight and would like to continue with her weight loss journey. The patient would like to have  total knee arthroplasty after summer. She has a vacation planned for the end of summer and would like to move forward with surgery upon return. The patient is accompanied by her  for her exam today.       Review of systems negative unless otherwise specified in HPI  Review of Systems   Constitutional:  Negative for appetite change and unexpected weight change.   HENT:  Negative for congestion and trouble swallowing.    Eyes:  Negative for visual disturbance.   Respiratory:  Negative for cough and shortness of breath.    Cardiovascular:  Negative for chest pain and palpitations.   Gastrointestinal:  Negative for nausea and vomiting.   Endocrine: Negative for cold intolerance and heat intolerance.   Musculoskeletal:  Positive for arthralgias (left knee). Negative for joint swelling and myalgias.   Skin:  Negative for rash.   Neurological:  Negative for numbness.       Past Medical History:   Diagnosis Date    Diabetes mellitus (HCC)     Hypertension        Past Surgical History:   Procedure Laterality Date    ANKLE FUSION Right     Patient states about 4 surgeries on ankle    CERVICAL POLYPECTOMY      DILATION AND CURETTAGE OF UTERUS      THYROID SURGERY      nodule removed       Family History   Problem Relation Age of Onset    Liver disease Mother     Aortic stenosis Father     Breast cancer Sister     Uterine cancer Paternal Grandmother        Social History     Occupational History    Not on file   Tobacco Use    Smoking status: Never    Smokeless tobacco: Never   Vaping Use    Vaping status: Never Used   Substance and Sexual Activity    Alcohol use: Yes     Comment: socially    Drug use: Never    Sexual activity: Yes     Partners: Male         Current Outpatient Medications:     amLODIPine (NORVASC) 5 mg tablet, Take 5 mg by mouth 2 (two) times a day, Disp: , Rfl:     ascorbic acid (VITAMIN C) 500 MG tablet, Take 1 tablet (500 mg total) by mouth 2 (two) times a day, Disp: 60 tablet, Rfl: 1     Cholecalciferol 25 MCG (1000 UT) tablet, Take 1,000 Units by mouth, Disp: , Rfl:     Diclofenac Sodium (VOLTAREN) 1 %, Voltaren 1 % External Gel Refills: 0, Disp: , Rfl:     ferrous sulfate 324 (65 Fe) mg, Take 1 tablet (324 mg total) by mouth 2 (two) times a day before meals, Disp: 60 tablet, Rfl: 1    folic acid (FOLVITE) 1 mg tablet, Take 1 tablet (1 mg total) by mouth daily, Disp: 30 tablet, Rfl: 1    meloxicam (MOBIC) 15 mg tablet, Take 15 mg by mouth daily, Disp: , Rfl:     metFORMIN (GLUCOPHAGE) 1000 MG tablet, Take 1 tablet by mouth 2 (two) times a day with meals, Disp: , Rfl:     Multiple Vitamins-Minerals (multivitamin with minerals) tablet, Take 1 tablet by mouth daily, Disp: 30 tablet, Rfl: 0    Multiple Vitamins-Minerals (PRESERVISION AREDS PO), Take 1 tablet by mouth 2 (two) times a day, Disp: , Rfl:     Ozempic, 0.25 or 0.5 MG/DOSE, 2 MG/3ML injection pen, Inject 0.25 mg under the skin, Disp: , Rfl:     rosuvastatin (CRESTOR) 10 MG tablet, Take 1 tablet by mouth daily, Disp: , Rfl:     valsartan-hydrochlorothiazide (DIOVAN-HCT) 320-12.5 MG per tablet, , Disp: , Rfl:     losartan-hydrochlorothiazide (HYZAAR) 100-12.5 MG per tablet, Take 1 tablet by mouth daily (Patient not taking: Reported on 4/17/2024), Disp: , Rfl:     Allergies   Allergen Reactions    Naproxen Other (See Comments)     angioedema              Vitals:    04/17/24 1336   BP: 127/68   Pulse: 95       Body mass index is 39.82 kg/m².  Wt Readings from Last 3 Encounters:   04/17/24 105 kg (232 lb)   05/24/23 111 kg (245 lb)       Objective:    General: No apparent distress  CV: S1-S2  Abdomen: Soft  Chest: No audible wheezing                    Ortho Exam  Left Knee  Alignment valgus  There is minimal effusion.    There is generalized swelling.  There is tenderness over the medial joint line.    Range of motion from 10 to 110.    There is  crepitus with range of motion.   The patient is able to perform a straight leg raise.    Stable to  "valgus and varus stress.   No ligament laxity, negative Lachman's and posterior drawer testing    Toes are pink and mobile  Compartments are soft  Brisk capillary refill  Sensation intact  The patient is neurovascular intact distally.    Diagnostics, reviewed and taken today if performed as documented:    The attending physician has personally reviewed the pertinent films in PACS and interpretation is as follows:    X-rays taken 4/17/24 of left knee demonstrate severe tricompartmental degenerative changes with osteophyte formation. Valgus alignment. No acute fracture.      Procedures, if performed today:    Procedures    None performed      Portions of the record may have been created with voice recognition software.  Occasional wrong word or \"sound a like\" substitutions may have occurred due to the inherent limitations of voice recognition software.  Read the chart carefully and recognize, using context, where substitutions have occurred.  "

## 2024-05-03 ENCOUNTER — FOLLOW UP (OUTPATIENT)
Dept: URBAN - METROPOLITAN AREA CLINIC 6 | Facility: CLINIC | Age: 69
End: 2024-05-03

## 2024-05-03 DIAGNOSIS — H25.812: ICD-10-CM

## 2024-05-03 DIAGNOSIS — H40.013: ICD-10-CM

## 2024-05-03 DIAGNOSIS — H02.831: ICD-10-CM

## 2024-05-03 DIAGNOSIS — H02.834: ICD-10-CM

## 2024-05-03 LAB — BLOOD WORKUP: NORMAL

## 2024-05-03 PROCEDURE — 92083 EXTENDED VISUAL FIELD XM: CPT

## 2024-05-03 PROCEDURE — 92012 INTRM OPH EXAM EST PATIENT: CPT

## 2024-05-03 ASSESSMENT — VISUAL ACUITY
OD_CC: 20/25-2
OS_CC: 20/30
OU_CC: J1

## 2024-05-03 ASSESSMENT — TONOMETRY
OD_IOP_MMHG: 18
OS_IOP_MMHG: 18

## 2024-05-17 DIAGNOSIS — M17.12 PRIMARY OSTEOARTHRITIS OF LEFT KNEE: ICD-10-CM

## 2024-05-17 RX ORDER — MULTIVIT-MIN/IRON FUM/FOLIC AC 7.5 MG-4
1 TABLET ORAL DAILY
Qty: 30 TABLET | Refills: 0 | Status: SHIPPED | OUTPATIENT
Start: 2024-05-17

## 2024-06-27 ENCOUNTER — ANNUAL EXAM (OUTPATIENT)
Dept: OBGYN CLINIC | Facility: CLINIC | Age: 69
End: 2024-06-27
Payer: COMMERCIAL

## 2024-06-27 VITALS
BODY MASS INDEX: 39.64 KG/M2 | SYSTOLIC BLOOD PRESSURE: 128 MMHG | WEIGHT: 232.2 LBS | DIASTOLIC BLOOD PRESSURE: 70 MMHG | HEIGHT: 64 IN

## 2024-06-27 DIAGNOSIS — B35.9 TINEA: ICD-10-CM

## 2024-06-27 DIAGNOSIS — Z12.31 SCREENING MAMMOGRAM, ENCOUNTER FOR: ICD-10-CM

## 2024-06-27 DIAGNOSIS — Z01.419 WOMEN'S ANNUAL ROUTINE GYNECOLOGICAL EXAMINATION: Primary | ICD-10-CM

## 2024-06-27 PROCEDURE — G0101 CA SCREEN;PELVIC/BREAST EXAM: HCPCS | Performed by: OBSTETRICS & GYNECOLOGY

## 2024-06-27 RX ORDER — CLOTRIMAZOLE AND BETAMETHASONE DIPROPIONATE 10; .64 MG/G; MG/G
CREAM TOPICAL 2 TIMES DAILY
Qty: 15 G | Refills: 1 | Status: SHIPPED | OUTPATIENT
Start: 2024-06-27

## 2024-06-27 NOTE — PROGRESS NOTES
"Herbie Heck is a 69 y.o. female who presents for annual well woman exam.       Menstrual History:  OB History          0    Para   0    Term   0       0    AB   0    Living   0         SAB   0    IAB   0    Ectopic   0    Multiple   0    Live Births   0               No LMP recorded. Patient is postmenopausal.       The following portions of the patient's history were reviewed and updated as appropriate: allergies, current medications, past family history, past medical history, past social history, past surgical history, and problem list.    Review of Systems  Review of Systems   Constitutional:  Negative for activity change, appetite change, chills, fatigue and fever.   Respiratory:  Negative for apnea, cough, chest tightness and shortness of breath.    Cardiovascular:  Negative for chest pain, palpitations and leg swelling.   Gastrointestinal:  Negative for abdominal pain, constipation, diarrhea, nausea and vomiting.   Genitourinary:  Negative for difficulty urinating, dysuria, flank pain, frequency, hematuria and urgency.        ANASTASIIA   Neurological:  Negative for dizziness, seizures, syncope, light-headedness, numbness and headaches.   Psychiatric/Behavioral:  Negative for agitation and confusion.           Objective      /70 (BP Location: Left arm, Patient Position: Sitting, Cuff Size: Adult)   Ht 5' 4\" (1.626 m)   Wt 105 kg (232 lb 3.2 oz)   BMI 39.86 kg/m²     Physical Exam  OBGyn Exam     General:   alert and oriented, in no acute distress, alert, appears stated age, and cooperative   Heart: regular rate and rhythm, S1, S2 normal, no murmur, click, rub or gallop   Lungs: clear to auscultation bilaterally   Abdomen: soft, non-tender, without masses or organomegaly   Vulva: normal   Vagina: normal mucosa, normal discharge   Cervix: no cervical motion tenderness and no lesions   Uterus: normal size   Adnexa:  Breast Exam:  normal adnexa  breasts appear normal, no " suspicious masses, no skin or nipple changes or axillary nodes.      Tinea under breast well demarcated lesion erythema           Assessment      @well woman@ .         69-year-old female  Annual exam  Mild ANASTASIIA desires expectant management   CHTN   PRE DM  N/A  Benign thyroid nodule  Sister had breast CA  To be tested fro BRCA  Plan   No pap needed  Diet/exercise  Calcium/vitamin D  Mammogram  Up to date with colonoscopy  Tinea under breast cram called to pharmacy and hygiene discussed  Bone scan DEXA normal 2024  Return to office for annual exam     All questions answered.     There are no Patient Instructions on file for this visit.

## 2024-07-01 ENCOUNTER — EVALUATION (OUTPATIENT)
Dept: PHYSICAL THERAPY | Facility: CLINIC | Age: 69
End: 2024-07-01
Payer: COMMERCIAL

## 2024-07-01 DIAGNOSIS — G89.29 CHRONIC PAIN OF LEFT KNEE: Primary | ICD-10-CM

## 2024-07-01 DIAGNOSIS — M25.562 CHRONIC PAIN OF LEFT KNEE: Primary | ICD-10-CM

## 2024-07-01 PROCEDURE — 97110 THERAPEUTIC EXERCISES: CPT | Performed by: PHYSICAL THERAPIST

## 2024-07-01 PROCEDURE — 97161 PT EVAL LOW COMPLEX 20 MIN: CPT | Performed by: PHYSICAL THERAPIST

## 2024-07-01 NOTE — PROGRESS NOTES
PT Evaluation     Today's date: 2024  Patient name: Noris Heck  : 1955  MRN: [id number]  Referring provider: Miracle Yepez MD  Dx:   Encounter Diagnosis     ICD-10-CM    1. Chronic pain of left knee  M25.562     G89.29           Start Time: 1530  Stop Time: 1614  Total time in clinic (min): 44 minutes    Assessment  Impairments: abnormal gait, abnormal muscle firing, abnormal muscle tone, abnormal or restricted ROM, abnormal movement, impaired physical strength, lacks appropriate home exercise program, pain with function and poor posture     Assessment details: Noris Heck is a pleasant 69 y.o. female who presents with chronic L knee pain. She is scheduled for L TKR in October.  The patient's greatest concerns are fear of not being able to keep active.      No further referral appears necessary at this time based upon examination results.    Primary movement impairment diagnosis of L knee ROM resulting in pathoanatomical symptoms of No diagnosis found. and limiting her ability to exercise or recreation, get out of a chair, lift, perform household chores, squat to  objects from the floor, stand, and walk.    Impairments include:  1) L knee ROM  2) L knee strength  3) Hip strength    Discussed risks, benefits, and alternatives to treatment, and answered all patient questions to patient satisfaction.  Understanding of Dx/Px/POC: good     Prognosis: good    Goals  Impairment Goals 4-6 weeks  - Decrease pain to <3/10  - Improve knee AROM to equal to the unaffected lower extremity  - Increase knee strength to 4/5 throughout  - Increase hip strength to 4/5 throughout    Functional Goals 6-8 weeks  - Return to Prior Level of Function  - Patient will be independent with HEP  - Patient will be able to walk without increased pain/compensation/difficulty  - Patient will be able to perform sit to stand without increased pain/compensation/difficulty   - Patient will be able to ascend and  descend 2 steps without increased pain/compensation/difficulty       Plan  Patient would benefit from: skilled physical therapy  Planned modality interventions: cryotherapy, TENS, thermotherapy: hydrocollator packs and unattended electrical stimulation    Planned therapy interventions: abdominal trunk stabilization, behavior modification, body mechanics training, breathing training, flexibility, functional ROM exercises, home exercise program, joint mobilization, manual therapy, massage, Sherman taping, muscle pump exercises, neuromuscular re-education, patient education, postural training, strengthening, stretching, therapeutic activities, therapeutic exercise and therapeutic training    Frequency: 2x week  Duration in weeks: 8  Treatment plan discussed with: patient  Plan details: Prognosis above is given PT services 2x/week tapering to 1x/week over the next 2 months and home program adherence.        Subjective Evaluation    History of Present Illness  Mechanism of injury: WORK/SCHOOL: Retired  HOME LIFE: one floor step, 2 [place], railings on both sides. 2 railings on basement steps  HOBBIES/EXERCISE: [employer] - bike, leg press, back stretching  PLOF:  R TKR 16 years ago  HISTORY OF CURRENT INJURY: Patient has had L knee pain for years. She started with cortisone, then gel shots, and now she is noticing that the shots did not help anymore. She knows she needs a knee replacement. She is having the replacement on October 7th, 2024 with Dr. Yepez.   PAIN LOCATION/DESCRIPTORS: L anterior knee pain, sides  AGGRAVATING FACTORS: walking, getting in and out of a chair, steps, squatting, kneeling, standing, laying on her side  EASES: ice, celebrex  DAY PATTERN: no pattern  IMAGING:  Severe tricompartment degenerative changes as detailed above with mild valgus angulation.  Mild patellar enthesopathy.  Small effusion.  No fracture or dislocation.  SPECIAL QUESTIONS:    Noris denies a new onset of History  of cancer, Tingling, and Numbness.  PATIENT GOALS: Patient wishes to improve her walking and mobility    Patient Goals  Patient goals for therapy: decreased pain, increased motion, increased strength and independence with ADLs/IADLs    Pain  Current pain rating: 3  At best pain rating: 3  At worst pain ratin  Location: L knee  Quality: discomfort, dull ache and sharp  Progression: worsening          Objective     Active Range of Motion   Left Knee   Flexion: 80 degrees with pain  Extension: -5 degrees with pain    Right Knee   Flexion: 125 degrees   Extension: 10 degrees     Passive Range of Motion   Left Knee   Flexion: 107 degrees   Extension: -5 degrees     Strength/Myotome Testing     Left Knee   Flexion: 4  Extension: 3+ (pain)  Quadriceps contraction: fair    Right Knee   Flexion: 4  Extension: 4    Additional Strength Details  L hip strength 4-/5, 3+/5 abduction, pain limited due to knee    General Comments:      Knee Comments  Ambulation: antalgic gait favoring L knee  Sit to stand: uses R leg more than L, holding L more out. Uses both hands                POC Expires Auth Status Start Date Expiration Date PT Visit Limit     No auth required      Date        Used        Remaining           Diagnosis: L knee OA   Precautions:    Primary Goals: 1) L knee ROM  2) L knee strength  3) Hip strength   *asterisks by exercise = given for HEP   Manuals             DO FOTO                                   There Ex        NuStep/Bike        Heel slides* 2 min       Seated heel slides        HS S        Quad S        Calf S                                Neuro Re-Ed        Quad set        SLR        SL SLR        Clam        Bridge        Standing 3 way SLR        Leg press                                        Patient education Diagnosis, prognosis, activity modification        Re-evaluation              Ther Act                                         Modalities

## 2024-07-11 ENCOUNTER — OFFICE VISIT (OUTPATIENT)
Dept: PHYSICAL THERAPY | Facility: CLINIC | Age: 69
End: 2024-07-11
Payer: COMMERCIAL

## 2024-07-11 DIAGNOSIS — M17.12 PRIMARY OSTEOARTHRITIS OF LEFT KNEE: Primary | ICD-10-CM

## 2024-07-11 PROCEDURE — 97110 THERAPEUTIC EXERCISES: CPT | Performed by: PHYSICAL THERAPIST

## 2024-07-11 PROCEDURE — 97112 NEUROMUSCULAR REEDUCATION: CPT | Performed by: PHYSICAL THERAPIST

## 2024-07-11 NOTE — PROGRESS NOTES
Daily Note     Today's date: 2024  Patient name: Noris Heck  : 1955  MRN: [id number]  Referring provider: Miracle Yepez MD  Dx:   Encounter Diagnosis     ICD-10-CM    1. Primary osteoarthritis of left knee  M17.12 Ambulatory referral to Physical Therapy          Start Time: 0930  Stop Time: 1015  Total time in clinic (min): 45 minutes    Subjective: Patient reports her knee feels about the same. She has pain every day. She is using the SPC when she feels a lot of pain but not every day.     Objective: See treatment diary below    Assessment: Patient tolerated session well, with focus on primary impairments of knee ROM, flexibility, and strength. HEP printed and reviewed and patient noted understanding and demonstrated excellent form. She returns to PT tomorrow morning.     Plan: Continue per plan of care.       POC Expires Auth Status Start Date Expiration Date PT Visit Limit     No auth required      Date        Used        Remaining           Diagnosis: L knee OA   Precautions:    Primary Goals: 1) L knee ROM  2) L knee strength  3) Hip strength   *asterisks by exercise = given for HEP   Manuals            DO FOTO                                   There Ex        NuStep/Bike  NuStep 5 min      Heel slides* 2 min 5 sec x 20 108*      Seated heel slides        HS S  Chair 3x30 sec      Quad S*  3x30 sec lb modified dina with rope      Calf S  Standing 3x30 sec                              Neuro Re-Ed        Quad set*  5 sec x 20 towel roll under knee, 5 sec x 10 no towel      SLR*  2x10 with quad set      SL SLR        Clam*  2x10      Bridge  2x10       Standing 3 way SLR  Abd x10 lb      Leg press                                        Patient education Diagnosis, prognosis, activity modification        Re-evaluation              Ther Act                                         Modalities

## 2024-07-12 ENCOUNTER — OFFICE VISIT (OUTPATIENT)
Dept: PHYSICAL THERAPY | Facility: CLINIC | Age: 69
End: 2024-07-12
Payer: COMMERCIAL

## 2024-07-12 DIAGNOSIS — M17.12 PRIMARY OSTEOARTHRITIS OF LEFT KNEE: Primary | ICD-10-CM

## 2024-07-12 DIAGNOSIS — M25.562 CHRONIC PAIN OF LEFT KNEE: ICD-10-CM

## 2024-07-12 DIAGNOSIS — G89.29 CHRONIC PAIN OF LEFT KNEE: ICD-10-CM

## 2024-07-12 PROCEDURE — 97110 THERAPEUTIC EXERCISES: CPT | Performed by: PHYSICAL THERAPIST

## 2024-07-12 PROCEDURE — 97140 MANUAL THERAPY 1/> REGIONS: CPT | Performed by: PHYSICAL THERAPIST

## 2024-07-12 NOTE — PROGRESS NOTES
Daily Note     Today's date: 2024  Patient name: Noris Heck  : 1955  MRN: [id number]  Referring provider: Miracle Yepez MD  Dx:   Encounter Diagnosis     ICD-10-CM    1. Primary osteoarthritis of left knee  M17.12       2. Chronic pain of left knee  M25.562     G89.29           Start Time: 0930  Stop Time: 1010  Total time in clinic (min): 40 minutes    Subjective: Patient notes she had some L knee soreness after PT yesterday. She tried to ice and this helped. This morning she is still a bit sore.     Objective: See treatment diary below    Assessment: Modified treatment as needed today due to pain in L knee following last visit. Added knee flexion stretching in sitting and continued with exercises that she could tolerate. Patient unable to complete full routine and ice was provided for pain relief.    Plan: Continue per plan of care.       POC Expires Auth Status Start Date Expiration Date PT Visit Limit     No auth required      Date        Used        Remaining           Diagnosis: L knee OA   Precautions:    Primary Goals: 1) L knee ROM  2) L knee strength  3) Hip strength   *asterisks by exercise = given for HEP   Manuals      Tibiofemoral distraction   IM, PT L knee gentle  DO FOTO                                   There Ex        NuStep/Bike  NuStep 5 min NuStep 5 min     Heel slides* 2 min 5 sec x 20 108* 5 sec x 10     Seated heel slides   X 20 AROM     HS S  Chair 3x30 sec      Quad S*  3x30 sec lb modified dina with rope 3x30 sec lb modified dina no rope     Calf S  Standing 3x30 sec                              Neuro Re-Ed        Quad set*  5 sec x 20 towel roll under knee, 5 sec x 10 no towel defer     SLR*  2x10 with quad set defer     SL SLR        Clam*  2x10 2x10      Bridge  2x10  2x10 3 sec     Standing 3 way SLR  Abd x10 lb      Leg press                                        Patient education Diagnosis, prognosis, activity modification         Re-evaluation              Ther Act                                         Modalities             Ice   10 min

## 2024-07-17 ENCOUNTER — OFFICE VISIT (OUTPATIENT)
Dept: PHYSICAL THERAPY | Facility: CLINIC | Age: 69
End: 2024-07-17
Payer: COMMERCIAL

## 2024-07-17 DIAGNOSIS — M17.12 PRIMARY OSTEOARTHRITIS OF LEFT KNEE: Primary | ICD-10-CM

## 2024-07-17 DIAGNOSIS — M25.562 CHRONIC PAIN OF LEFT KNEE: ICD-10-CM

## 2024-07-17 DIAGNOSIS — G89.29 CHRONIC PAIN OF LEFT KNEE: ICD-10-CM

## 2024-07-17 PROCEDURE — 97110 THERAPEUTIC EXERCISES: CPT | Performed by: PHYSICAL THERAPIST

## 2024-07-17 PROCEDURE — 97112 NEUROMUSCULAR REEDUCATION: CPT | Performed by: PHYSICAL THERAPIST

## 2024-07-17 NOTE — PROGRESS NOTES
Daily Note     Today's date: 2024  Patient name: Noris Heck  : 1955  MRN: [id number]  Referring provider: Miracle Yepez MD  Dx:   Encounter Diagnosis     ICD-10-CM    1. Primary osteoarthritis of left knee  M17.12       2. Chronic pain of left knee  M25.562     G89.29           Start Time: 915  Stop Time: 1005  Total time in clinic (min): 50 minutes    Subjective: Patient reports her knee always hurts.    Objective: See treatment diary below    Assessment: Tolerated treatment well and was able to continue with stretches and gentle strengthening today . Patient exhibited good technique with therapeutic exercises and was able to increase knee flexion ROM to 110 degrees. Patient has pain in L knee with nearly every exercise and with transitions, but there is no increase from baseline following PT. Ended with ice.    Plan: Progress treatment as tolerated.        POC Expires Auth Status Start Date Expiration Date PT Visit Limit     No auth required      Date        Used        Remaining           Diagnosis: L knee OA   Precautions:    Primary Goals: 1) L knee ROM  2) L knee strength  3) Hip strength   *asterisks by exercise = given for HEP   Manuals     Tibiofemoral distraction   IM, PT L knee gentle  DO FOTO                                   There Ex        NuStep/Bike  NuStep 5 min NuStep 5 min NuStep 5 min    Heel slides* 2 min 5 sec x 20 108* 5 sec x 10 5 sec x 20 110*    Seated heel slides   X 20 AROM X 20 AROM    HS S  Chair 3x30 sec  Chair 3x30 sec    Quad S*  3x30 sec lb modified dina with rope 3x30 sec lb modified dina no rope 3x30 sec lb modified dina with rope    Calf S  Standing 3x30 sec                              Neuro Re-Ed        Quad set*  5 sec x 20 towel roll under knee, 5 sec x 10 no towel defer 5 sec x 20 towel roll under knee    SLR*  2x10 with quad set defer 2 X 10 with quad set lb    SL SLR        Clam*  2x10 2x10  2x10 lb    Reverse clam     2x10 lb    Bridge  2x10  2x10 3 sec 2x10 3 sec    Standing 3 way SLR  Abd x10 lb  Abd x 10 lb    Leg press                                        Patient education Diagnosis, prognosis, activity modification        Re-evaluation              Ther Act                                         Modalities             Ice   10 min 10 min

## 2024-07-19 ENCOUNTER — OFFICE VISIT (OUTPATIENT)
Dept: PHYSICAL THERAPY | Facility: CLINIC | Age: 69
End: 2024-07-19
Payer: COMMERCIAL

## 2024-07-19 DIAGNOSIS — M17.12 PRIMARY OSTEOARTHRITIS OF LEFT KNEE: Primary | ICD-10-CM

## 2024-07-19 DIAGNOSIS — G89.29 CHRONIC PAIN OF LEFT KNEE: ICD-10-CM

## 2024-07-19 DIAGNOSIS — M25.562 CHRONIC PAIN OF LEFT KNEE: ICD-10-CM

## 2024-07-19 PROCEDURE — 97112 NEUROMUSCULAR REEDUCATION: CPT

## 2024-07-19 PROCEDURE — 97110 THERAPEUTIC EXERCISES: CPT

## 2024-07-19 NOTE — PROGRESS NOTES
Daily Note     Today's date: 2024  Patient name: Noris Heck  : 1955  MRN: [id number]  Referring provider: Miracle Yepez MD  Dx:   Encounter Diagnosis     ICD-10-CM    1. Primary osteoarthritis of left knee  M17.12       2. Chronic pain of left knee  M25.562     G89.29           Start Time: 1000  Stop Time: 1040  Total time in clinic (min): 40 minutes    Subjective: Patient states she is feeling much better today. She states she still has pain all the time in her L knee.       Objective: See treatment diary below      Assessment: Continued with outlined program. Patient has noticeable improvements this visit with exercises. She has good carryover as well evident of HEP compliance. She exhibits good technique with table exercises requiring minimal cues for correction. Patient was able to improve knee flexion ROM this visit. She states she feels good post session.       Plan: Continue per plan of care.       POC Expires Auth Status Start Date Expiration Date PT Visit Limit     No auth required      Date        Used        Remaining           Diagnosis: L knee OA   Precautions:    Primary Goals: 1) L knee ROM  2) L knee strength  3) Hip strength   *asterisks by exercise = given for HEP   Manuals    Tibiofemoral distraction   IM, PT L knee gentle  DO FOTO - NV system down                                    There Ex        NuStep/Bike  NuStep 5 min NuStep 5 min NuStep 5 min NuStep 5 min    Heel slides* 2 min 5 sec x 20 108* 5 sec x 10 5 sec x 20 110* 5 sec x20 118*    Seated heel slides   X 20 AROM X 20 AROM X20 AROM    HS S  Chair 3x30 sec  Chair 3x30 sec Chair 3x30 sec    Quad S*  3x30 sec lb modified dina with rope 3x30 sec lb modified dina no rope 3x30 sec lb modified dina with rope    Calf S  Standing 3x30 sec                              Neuro Re-Ed        Quad set*  5 sec x 20 towel roll under knee, 5 sec x 10 no towel defer 5 sec x 20 towel roll under knee  5 sec x20 towel roll under knee    SLR*  2x10 with quad set defer 2 X 10 with quad set lb 2x10 with quad set lb    SL SLR        Clam*  2x10 2x10  2x10 lb 2x10 lb    Reverse clam    2x10 lb 2x10 lb    Bridge  2x10  2x10 3 sec 2x10 3 sec 2x10 3 sec    Standing 3 way SLR  Abd x10 lb  Abd x 10 lb Abd 2x10 lb    Leg press                                        Patient education Diagnosis, prognosis, activity modification        Re-evaluation              Ther Act             Sit to stands         10x no hands one foam                  Modalities             Ice   10 min 10 min defer

## 2024-07-22 ENCOUNTER — APPOINTMENT (OUTPATIENT)
Dept: PHYSICAL THERAPY | Facility: CLINIC | Age: 69
End: 2024-07-22
Payer: COMMERCIAL

## 2024-07-25 ENCOUNTER — OFFICE VISIT (OUTPATIENT)
Dept: PHYSICAL THERAPY | Facility: CLINIC | Age: 69
End: 2024-07-25
Payer: COMMERCIAL

## 2024-07-25 DIAGNOSIS — M25.562 CHRONIC PAIN OF LEFT KNEE: ICD-10-CM

## 2024-07-25 DIAGNOSIS — M17.12 PRIMARY OSTEOARTHRITIS OF LEFT KNEE: Primary | ICD-10-CM

## 2024-07-25 DIAGNOSIS — G89.29 CHRONIC PAIN OF LEFT KNEE: ICD-10-CM

## 2024-07-25 PROCEDURE — 97110 THERAPEUTIC EXERCISES: CPT

## 2024-07-25 PROCEDURE — 97112 NEUROMUSCULAR REEDUCATION: CPT

## 2024-07-25 NOTE — PROGRESS NOTES
Daily Note     Today's date: 2024  Patient name: Noris Heck  : 1955  MRN: [id number]  Referring provider: Miracle Yepez MD  Dx:   Encounter Diagnosis     ICD-10-CM    1. Primary osteoarthritis of left knee  M17.12       2. Chronic pain of left knee  M25.562     G89.29           Subjective: Patient states knee still bothering her, rates pain as 6/10, states this is about her normal pain level.       Objective: See treatment diary below      Assessment: Patient demo good effort throughout session. Has some mild L knee discomfort throughout. Most discomfort noted with STS. Demo good technique and proper form with exercises. Able to add leg press today, cueing for controlled TKE, especially on RLE. Would continue to benefit from PT to improve pain and strength.        Plan: Continue per plan of care.       POC Expires Auth Status Start Date Expiration Date PT Visit Limit     No auth required      Date        Used        Remaining           Diagnosis: L knee OA   Precautions:    Primary Goals: 1) L knee ROM  2) L knee strength  3) Hip strength   *asterisks by exercise = given for HEP   Manuals    Tibiofemoral distraction   IM, PT L knee gentle  DO FOTO - NV system down                                    There Ex        NuStep/Bike Nustep L5 5 min NuStep 5 min NuStep 5 min NuStep 5 min NuStep 5 min    Heel slides* 5'', 20x 5 sec x 20 108* 5 sec x 10 5 sec x 20 110* 5 sec x20 118*    Seated heel slides   X 20 AROM X 20 AROM X20 AROM    HS S  Chair 3x30 sec  Chair 3x30 sec Chair 3x30 sec    Quad S*  3x30 sec lb modified dina with rope 3x30 sec lb modified dina no rope 3x30 sec lb modified dina with rope    Calf S  Standing 3x30 sec                              Neuro Re-Ed        Quad set*  5 sec x 20 towel roll under knee, 5 sec x 10 no towel defer 5 sec x 20 towel roll under knee 5 sec x20 towel roll under knee    SLR* 2x10 with quad set lb  2x10 with quad set  defer 2 X 10 with quad set lb 2x10 with quad set lb    SL SLR        Clam* 2x10 lb  2x10 2x10  2x10 lb 2x10 lb    Reverse clam 2x10 lb   2x10 lb 2x10 lb    Bridge 2x10 2x10  2x10 3 sec 2x10 3 sec 2x10 3 sec    Standing 3 way SLR Abd/ext  2x10 lb Abd x10 lb  Abd x 10 lb Abd 2x10 lb    Leg press DL 50# 2x10                                       Patient education         Re-evaluation             Ther Act            Sit to stands 2x10 no hands one foam        10x no hands one foam                 Modalities            Ice   10 min 10 min defer

## 2024-07-30 DIAGNOSIS — M17.12 PRIMARY OSTEOARTHRITIS OF LEFT KNEE: ICD-10-CM

## 2024-07-31 ENCOUNTER — OFFICE VISIT (OUTPATIENT)
Dept: PHYSICAL THERAPY | Facility: CLINIC | Age: 69
End: 2024-07-31
Payer: COMMERCIAL

## 2024-07-31 DIAGNOSIS — M17.12 PRIMARY OSTEOARTHRITIS OF LEFT KNEE: Primary | ICD-10-CM

## 2024-07-31 DIAGNOSIS — G89.29 CHRONIC PAIN OF LEFT KNEE: ICD-10-CM

## 2024-07-31 DIAGNOSIS — M25.562 CHRONIC PAIN OF LEFT KNEE: ICD-10-CM

## 2024-07-31 PROCEDURE — 97112 NEUROMUSCULAR REEDUCATION: CPT

## 2024-07-31 PROCEDURE — 97110 THERAPEUTIC EXERCISES: CPT

## 2024-07-31 RX ORDER — CELECOXIB 200 MG/1
200 CAPSULE ORAL DAILY
Qty: 30 CAPSULE | Refills: 1 | Status: SHIPPED | OUTPATIENT
Start: 2024-07-31

## 2024-08-02 ENCOUNTER — OFFICE VISIT (OUTPATIENT)
Dept: PHYSICAL THERAPY | Facility: CLINIC | Age: 69
End: 2024-08-02
Payer: COMMERCIAL

## 2024-08-02 DIAGNOSIS — G89.29 CHRONIC PAIN OF LEFT KNEE: ICD-10-CM

## 2024-08-02 DIAGNOSIS — M25.562 CHRONIC PAIN OF LEFT KNEE: ICD-10-CM

## 2024-08-02 DIAGNOSIS — M17.12 PRIMARY OSTEOARTHRITIS OF LEFT KNEE: Primary | ICD-10-CM

## 2024-08-02 PROCEDURE — 97112 NEUROMUSCULAR REEDUCATION: CPT

## 2024-08-02 PROCEDURE — 97110 THERAPEUTIC EXERCISES: CPT

## 2024-08-02 NOTE — PROGRESS NOTES
Daily Note     Today's date: 2024  Patient name: Noris Heck  : 1955  MRN: [id number]  Referring provider: Miracle Yepez MD  Dx:   Encounter Diagnosis     ICD-10-CM    1. Primary osteoarthritis of left knee  M17.12       2. Chronic pain of left knee  M25.562     G89.29           Start Time: 09  Stop Time: 1038  Total time in clinic (min): 41 minutes    Subjective: Patient states she felt good following last session. She states she had another appt following PT and when she got home she iced for a little bit.       Objective: See treatment diary below      Assessment: Continued with outlined program. Patient has a little more tightness than last visit but overall she was able to perform heel slides with fair tolerance, just a little discomfort at end range. She was able to add resistance as noted with strengthening exercises. Trialed step ups with pain initially but this resolves with a few reps for L knee. She is slowly progressing towards goals.       Plan: Continue per plan of care.       POC Expires Auth Status Start Date Expiration Date PT Visit Limit     No auth required      Date        Used        Remaining           Diagnosis: L knee OA   Precautions:    Primary Goals: 1) L knee ROM  2) L knee strength  3) Hip strength   *asterisks by exercise = given for HEP   Manuals  - FOTO     Tibiofemoral distraction     DO FOTO - NV system down                                    There Ex        NuStep/Bike for ROM Nustep L5 5 min NuStep 5 min  NuStep 5 min  NuStep 5 min NuStep 5 min    Heel slides* 5'', 20x 5 sec x20 118* 5 sec x20  116*  5 sec x 20 110* 5 sec x20 118*    Seated heel slides    X 20 AROM X20 AROM    HS S    Chair 3x30 sec Chair 3x30 sec    Quad S*    3x30 sec lb hamilton arroyo with rope    Calf S                                Neuro Re-Ed        Quad set*    5 sec x 20 towel roll under knee 5 sec x20 towel roll under knee    SLR* 2x10 with quad set lb   2x10 with quad set  2x10 with quad set  2 X 10 with quad set lb 2x10 with quad set lb    SL SLR        Clam* 2x10 lb  OTB 2x10 lb  OTB 2x10 lb  2x10 lb 2x10 lb    Reverse clam 2x10 lb 2x10 lb  2x10 lb  2x10 lb 2x10 lb    Bridge 2x10 3 sec 2x10  OTB with hip abd 2x10  2x10 3 sec 2x10 3 sec    Standing 3 way SLR Abd/ext  2x10 lb Abd/ext 2x10 lb  Abd/ext 2x12 lb  Abd x 10 lb Abd 2x10 lb    Leg press DL 50# 2x10 DL 50# 2x10  DL 55# 2x10                                      Patient education         Re-evaluation           Ther Act          Sit to stands 2x10 no hands one foam  2x10 no hands one foam  2x10 no hands one foam    10x no hands one foam    Step ups   6in 2x10 lb        Modalities          Ice    10 min defer

## 2024-08-05 ENCOUNTER — EVALUATION (OUTPATIENT)
Dept: PHYSICAL THERAPY | Facility: CLINIC | Age: 69
End: 2024-08-05
Payer: COMMERCIAL

## 2024-08-05 DIAGNOSIS — M25.562 CHRONIC PAIN OF LEFT KNEE: ICD-10-CM

## 2024-08-05 DIAGNOSIS — M17.12 PRIMARY OSTEOARTHRITIS OF LEFT KNEE: Primary | ICD-10-CM

## 2024-08-05 DIAGNOSIS — G89.29 CHRONIC PAIN OF LEFT KNEE: ICD-10-CM

## 2024-08-05 PROCEDURE — 97110 THERAPEUTIC EXERCISES: CPT | Performed by: PHYSICAL THERAPIST

## 2024-08-05 PROCEDURE — 97112 NEUROMUSCULAR REEDUCATION: CPT | Performed by: PHYSICAL THERAPIST

## 2024-08-05 NOTE — PROGRESS NOTES
PT Discharge    Today's date: 2024  Patient name: Noris Heck  : 1955  MRN: [id number]  Referring provider: Miracle Yepez MD  Dx:   Encounter Diagnosis     ICD-10-CM    1. Primary osteoarthritis of left knee  M17.12       2. Chronic pain of left knee  M25.562     G89.29                      Assessment  Impairments: abnormal gait, abnormal or restricted ROM, impaired physical strength and pain with function    Assessment details: Noris Heck has been compliant with attending physical therapy and completing home exercise program since initial evaluation.  Noris  has made improvements in objective data and has been consistent with HEP.  Patient is scheduled for TKR in October. Patient provided with updated Home Exercise Program, all questions answered, and verbalized understanding, agreeing to plan of care. Thus it was mutually decided to discontinue this episode of care and transition to Home Exercise Program.     Understanding of Dx/Px/POC: good     Prognosis: good    Goals  Impairment Goals 4-6 weeks  - Decrease pain to <3/10 - partially met  - Improve knee AROM to equal to the unaffected lower extremity - partially met  - Increase knee strength to 4/5 throughout - partially met  - Increase hip strength to 4/5 throughout - partially met    Functional Goals 6-8 weeks  - Return to Prior Level of Function - partially met  - Patient will be independent with HEP - met  - Patient will be able to walk without increased pain/compensation/difficulty - not met  - Patient will be able to perform sit to stand without increased pain/compensation/difficulty - met  - Patient will be able to ascend and descend 2 steps without increased pain/compensation/difficulty - met      Plan    Planned therapy interventions: home exercise program    Treatment plan discussed with: patient      Subjective Evaluation    History of Present Illness  Mechanism of injury: WORK/SCHOOL: Retired  HOME LIFE: one floor  step, 2 [place], railings on both sides. 2 railings on basement steps  HOBBIES/EXERCISE: nubelo - bike, leg press, back stretching  PLOF:  R TKR 16 years ago  HISTORY OF CURRENT INJURY: Patient has had L knee pain for years. She started with cortisone, then gel shots, and now she is noticing that the shots did not help anymore. She knows she needs a knee replacement. She is having the replacement on 2024 with Dr. Yepez.     Update:  Patient notes a little bit of improvement in her L knee. She notices her pain has lessened a bit, though it is still constant. Pain is activity related. She ices to feel better.   PAIN LOCATION/DESCRIPTORS: L anterior knee pain, sides  AGGRAVATING FACTORS: walking, getting in and out of a chair, steps, squatting, kneeling, standing, laying on her side  Improved: standing  EASES: ice, celebrex  DAY PATTERN: no pattern  IMAGING:  Severe tricompartment degenerative changes as detailed above with mild valgus angulation.  Mild patellar enthesopathy.  Small effusion.  No fracture or dislocation.  SPECIAL QUESTIONS:    Noris denies a new onset of History of cancer, Tingling, and Numbness.  PATIENT GOALS: Patient wishes to improve her walking and mobility - Patient rates herself at 70-80% improved      Patient Goals  Patient goals for therapy: decreased pain, increased motion, increased strength and independence with ADLs/IADLs    Pain  Current pain ratin  At best pain rating: 3  At worst pain ratin  Location: L knee  Quality: discomfort, dull ache and sharp  Progression: improved        Objective     Active Range of Motion   Left Knee   Flexion: 108 degrees   Extension: -5 degrees     Right Knee   Flexion: 125 degrees   Extension: 10 degrees     Passive Range of Motion   Left Knee   Flexion: 115 degrees   Extension: -5 degrees     Strength/Myotome Testing     Left Knee   Flexion: 4  Extension: 3+ (pain)  Quadriceps contraction: good    Right Knee    Flexion: 4  Extension: 4    Additional Strength Details  L hip strength 4-/5, 3+/5 abduction, pain limited due to knee    General Comments:      Knee Comments  Ambulation: antalgic gait favoring L knee  Sit to stand: uses both legs equally, no hands              POC Expires Auth Status Start Date Expiration Date PT Visit Limit     No auth required      Date        Used        Remaining           Diagnosis: L knee OA   Precautions:    Primary Goals: 1) L knee ROM  2) L knee strength  3) Hip strength   *asterisks by exercise = given for HEP   Manuals 7/25 7/31 - FOTO  8/2 8/5 7/19   Tibiofemoral distraction     DO FOTO - NV system down                                    There Ex        NuStep/Bike for ROM Nustep L5 5 min NuStep 5 min  NuStep 5 min  NuStep 5 min  NuStep 5 min    Heel slides* 5'', 20x 5 sec x20 118* 5 sec x20  116*  5 sec x 20 115* 5 sec x20 118*    Seated heel slides     X20 AROM    HS S     Chair 3x30 sec    Quad S*        Calf S                                Neuro Re-Ed        Quad set*     5 sec x20 towel roll under knee    SLR* 2x10 with quad set lb  2x10 with quad set  2x10 with quad set   2x10 with quad set lb    SL SLR        Clam* 2x10 lb  OTB 2x10 lb  OTB 2x10 lb   2x10 lb    Reverse clam 2x10 lb 2x10 lb  2x10 lb   2x10 lb    Bridge 2x10 3 sec 2x10  OTB with hip abd 2x10   2x10 3 sec    Standing 3 way SLR Abd/ext  2x10 lb Abd/ext 2x10 lb  Abd/ext 2x12 lb   Abd 2x10 lb    Leg press DL 50# 2x10 DL 50# 2x10  DL 55# 2x10                                      Patient education         Re-evaluation    IM, PT      Ther Act         Sit to stands 2x10 no hands one foam  2x10 no hands one foam  2x10 no hands one foam   10x no hands one foam    Step ups   6in 2x10 lb       Modalities         Ice     defer

## 2024-08-08 ENCOUNTER — APPOINTMENT (OUTPATIENT)
Dept: PHYSICAL THERAPY | Facility: CLINIC | Age: 69
End: 2024-08-08
Payer: COMMERCIAL

## 2024-08-12 ENCOUNTER — APPOINTMENT (OUTPATIENT)
Dept: PHYSICAL THERAPY | Facility: CLINIC | Age: 69
End: 2024-08-12
Payer: COMMERCIAL

## 2024-08-15 ENCOUNTER — APPOINTMENT (OUTPATIENT)
Dept: PHYSICAL THERAPY | Facility: CLINIC | Age: 69
End: 2024-08-15
Payer: COMMERCIAL

## 2024-08-19 ENCOUNTER — APPOINTMENT (OUTPATIENT)
Dept: PHYSICAL THERAPY | Facility: CLINIC | Age: 69
End: 2024-08-19
Payer: COMMERCIAL

## 2024-08-19 NOTE — PROGRESS NOTES
Daily Note     Today's date: 2024  Patient name: Noris Heck  : 1955  MRN: [id number]  Referring provider: Miracle Yepez MD  Dx:   Encounter Diagnosis     ICD-10-CM    1. Primary osteoarthritis of left knee  M17.12       2. Chronic pain of left knee  M25.562     G89.29           Start Time: 1130  Stop Time: 1215  Total time in clinic (min): 45 minutes    Subjective: Patient states she is doing alright but she is limping.      Objective: See treatment diary below      Assessment: Continued with outlined program. Patient has noticeable discomfort with positional changes for L knee. She remains consistent with her knee flexion. She was able to progress with orange TB for clamshells this visit. She exhibits good technique with standing hip strengthening exercises. She is slowly progressing towards goals.     FOTO score improved.     Plan: Continue per plan of care.       POC Expires Auth Status Start Date Expiration Date PT Visit Limit     No auth required      Date        Used        Remaining           Diagnosis: L knee OA   Precautions:    Primary Goals: 1) L knee ROM  2) L knee strength  3) Hip strength   *asterisks by exercise = given for HEP   Manuals  - FOTO     Tibiofemoral distraction   IM, PT L knee gentle  DO FOTO - NV system down                                    There Ex        NuStep/Bike Nustep L5 5 min NuStep 5 min  NuStep 5 min NuStep 5 min NuStep 5 min    Heel slides* 5'', 20x 5 sec x20 118* 5 sec x 10 5 sec x 20 110* 5 sec x20 118*    Seated heel slides   X 20 AROM X 20 AROM X20 AROM    HS S    Chair 3x30 sec Chair 3x30 sec    Quad S*   3x30 sec lb modified dina no rope 3x30 sec lb modified dina with rope    Calf S                                Neuro Re-Ed        Quad set*   defer 5 sec x 20 towel roll under knee 5 sec x20 towel roll under knee    SLR* 2x10 with quad set lb  2x10 with quad set  defer 2 X 10 with quad set lb 2x10 with quad set  73-year-old female with a history of dementia hypertension hyperlipidemia, dermatomyositis, seizure, hypothyroid presents with left hand wound.  Patient has been dealing with chronic left hand blood blister areas and this morning wound care nurse.  Living noticed that the blood blister burst and was having continuous mild oozing.  Was dressed with gauze wrapped and patient sent to the ED.  Patient AO x 0 and nonverbal at baseline per EMS  Exam as stated.  Wound care done on wound with SURGICEL, nonadherent dressing, guaze and kerlex dressing. Bleeding subsided. No overlying or surrounding erythema, warmth, low suspcion for infxn   Patient to be discharged from ED. Any available test results were discussed with patient and/or family. Verbal instructions given, including instructions to return to ED immediately for any new, worsening, or concerning symptoms. Written discharge instructions additionally given, including follow-up plan. lb    SL SLR        Clam* 2x10 lb  OTB 2x10 lb  2x10  2x10 lb 2x10 lb    Reverse clam 2x10 lb 2x10 lb   2x10 lb 2x10 lb    Bridge 2x10 3 sec 2x10  2x10 3 sec 2x10 3 sec 2x10 3 sec    Standing 3 way SLR Abd/ext  2x10 lb Abd/ext 2x10 lb   Abd x 10 lb Abd 2x10 lb    Leg press DL 50# 2x10 DL 50# 2x10                                       Patient education         Re-evaluation            Ther Act           Sit to stands 2x10 no hands one foam  2x10 no hands one foam      10x no hands one foam                Modalities           Ice   10 min 10 min defer

## 2024-08-22 ENCOUNTER — APPOINTMENT (OUTPATIENT)
Dept: PHYSICAL THERAPY | Facility: CLINIC | Age: 69
End: 2024-08-22
Payer: COMMERCIAL

## 2024-08-26 ENCOUNTER — APPOINTMENT (OUTPATIENT)
Dept: PHYSICAL THERAPY | Facility: CLINIC | Age: 69
End: 2024-08-26
Payer: COMMERCIAL

## 2024-08-29 ENCOUNTER — APPOINTMENT (OUTPATIENT)
Dept: PHYSICAL THERAPY | Facility: CLINIC | Age: 69
End: 2024-08-29
Payer: COMMERCIAL

## 2024-09-04 ENCOUNTER — TELEPHONE (OUTPATIENT)
Age: 69
End: 2024-09-04

## 2024-09-04 ENCOUNTER — TELEPHONE (OUTPATIENT)
Dept: OBGYN CLINIC | Facility: HOSPITAL | Age: 69
End: 2024-09-04

## 2024-09-04 NOTE — TELEPHONE ENCOUNTER
Preoperative Elective Admission Assessment- spoke to patient.     Living Situation:    Who does pt live with: spouse  What kind of home: ranch  How do they enter the home: front  How many levels in home: 1 level home    # of steps to enter home: 2 to enter with railing on both sides.   Sleeping arrangement: first/entry floor  Where is Bathroom: Walk in shower with bars and seat.      First Floor Setup:   Is there a bathroom: Yes  Where would pt sleep: bed     DME: rolling walker and cane (instructed to bring RW DOS).     We discussed clearing pathways in the home and making sure there is accessibly to use the walker, for example, removing throw rugs.      Patient's Current Level of Function: Ambulates with cane and ADLs: Independent    Post-op Caregiver: spouse  Currently receive any HHC/aides/community supports: No     Post-op Transport: spouse  To/from hospital: spouse  To/from PT 2-3x/week: spouse  Uses community transport now: No     Outpatient Physical Therapy Site:  Site: [facility].   pre and post-op appts scheduled? Yes     Medication Management: self  Preferred Pharmacy for Post-op Medications: [id number] - [place], [place] - [address] [[id number]]   Blood Management Vitamin Regimen: Pt confirms taking as prescribed  Post-op anticoagulant: to be determined by surgical team postoperatively     DC Plan: Pt plans to be discharged home    Barriers to DC identified preoperatively: none identified    BMI: 39.86    Patient Education:  Pt educated on post-op pain, early mobilization (POD0), LOS goals, OP PT goals, and preoperative bathing. Patient educated that our goal is to appropriately discharge patient based off their post-op function while striving to maintain maximal independence. The goal is to discharge patient to home and for them to attend outpatient physical therapy.    Assigned to care team? Yes

## 2024-09-04 NOTE — TELEPHONE ENCOUNTER
Called pt, she does see a cardiologist every year to every 2 years. Advised we do not need clearance but if her EKG is abnormal she may need it so the sooner she goes the better, she will go for labs and EKG next week

## 2024-09-04 NOTE — TELEPHONE ENCOUNTER
Caller: Noris    Doctor: Lucho    Reason for call: Patient would like to know if she needs cardiac clearance for Sx  Please advise  Thank you  Call back#: [phone]

## 2024-09-12 ENCOUNTER — APPOINTMENT (OUTPATIENT)
Dept: LAB | Facility: CLINIC | Age: 69
End: 2024-09-12
Payer: COMMERCIAL

## 2024-09-12 DIAGNOSIS — M17.12 PRIMARY OSTEOARTHRITIS OF LEFT KNEE: ICD-10-CM

## 2024-09-12 LAB
ABO GROUP BLD: NORMAL
ALBUMIN SERPL BCG-MCNC: 4.4 G/DL (ref 3.5–5)
ALP SERPL-CCNC: 51 U/L (ref 34–104)
ALT SERPL W P-5'-P-CCNC: 12 U/L (ref 7–52)
ANION GAP SERPL CALCULATED.3IONS-SCNC: 7 MMOL/L (ref 4–13)
APTT PPP: 30 SECONDS (ref 23–34)
AST SERPL W P-5'-P-CCNC: 15 U/L (ref 13–39)
ATRIAL RATE: 78 BPM
BASOPHILS # BLD AUTO: 0.06 THOUSANDS/ΜL (ref 0–0.1)
BASOPHILS NFR BLD AUTO: 1 % (ref 0–1)
BILIRUB SERPL-MCNC: 0.51 MG/DL (ref 0.2–1)
BLD GP AB SCN SERPL QL: NEGATIVE
BUN SERPL-MCNC: 19 MG/DL (ref 5–25)
CALCIUM SERPL-MCNC: 10.5 MG/DL (ref 8.4–10.2)
CHLORIDE SERPL-SCNC: 103 MMOL/L (ref 96–108)
CO2 SERPL-SCNC: 28 MMOL/L (ref 21–32)
CREAT SERPL-MCNC: 0.65 MG/DL (ref 0.6–1.3)
EOSINOPHIL # BLD AUTO: 0.39 THOUSAND/ΜL (ref 0–0.61)
EOSINOPHIL NFR BLD AUTO: 5 % (ref 0–6)
ERYTHROCYTE [DISTWIDTH] IN BLOOD BY AUTOMATED COUNT: 13.7 % (ref 11.6–15.1)
EST. AVERAGE GLUCOSE BLD GHB EST-MCNC: 131 MG/DL
GFR SERPL CREATININE-BSD FRML MDRD: 89 ML/MIN/1.73SQ M
GLUCOSE P FAST SERPL-MCNC: 89 MG/DL (ref 65–99)
HBA1C MFR BLD: 6.2 %
HCT VFR BLD AUTO: 36.6 % (ref 34.8–46.1)
HGB BLD-MCNC: 11.7 G/DL (ref 11.5–15.4)
IMM GRANULOCYTES # BLD AUTO: 0.02 THOUSAND/UL (ref 0–0.2)
IMM GRANULOCYTES NFR BLD AUTO: 0 % (ref 0–2)
INR PPP: 0.98 (ref 0.85–1.19)
LYMPHOCYTES # BLD AUTO: 2.05 THOUSANDS/ΜL (ref 0.6–4.47)
LYMPHOCYTES NFR BLD AUTO: 24 % (ref 14–44)
MCH RBC QN AUTO: 29 PG (ref 26.8–34.3)
MCHC RBC AUTO-ENTMCNC: 32 G/DL (ref 31.4–37.4)
MCV RBC AUTO: 91 FL (ref 82–98)
MONOCYTES # BLD AUTO: 0.64 THOUSAND/ΜL (ref 0.17–1.22)
MONOCYTES NFR BLD AUTO: 8 % (ref 4–12)
NEUTROPHILS # BLD AUTO: 5.36 THOUSANDS/ΜL (ref 1.85–7.62)
NEUTS SEG NFR BLD AUTO: 62 % (ref 43–75)
NRBC BLD AUTO-RTO: 0 /100 WBCS
P AXIS: 37 DEGREES
PLATELET # BLD AUTO: 300 THOUSANDS/UL (ref 149–390)
PMV BLD AUTO: 10.4 FL (ref 8.9–12.7)
POTASSIUM SERPL-SCNC: 4.2 MMOL/L (ref 3.5–5.3)
PR INTERVAL: 190 MS
PROT SERPL-MCNC: 7.7 G/DL (ref 6.4–8.4)
PROTHROMBIN TIME: 13.7 SECONDS (ref 12.3–15)
QRS AXIS: -17 DEGREES
QRSD INTERVAL: 88 MS
QT INTERVAL: 362 MS
QTC INTERVAL: 412 MS
RBC # BLD AUTO: 4.03 MILLION/UL (ref 3.81–5.12)
RH BLD: POSITIVE
SODIUM SERPL-SCNC: 138 MMOL/L (ref 135–147)
SPECIMEN EXPIRATION DATE: NORMAL
T WAVE AXIS: 17 DEGREES
VENTRICULAR RATE: 78 BPM
WBC # BLD AUTO: 8.52 THOUSAND/UL (ref 4.31–10.16)

## 2024-09-12 PROCEDURE — 86850 RBC ANTIBODY SCREEN: CPT

## 2024-09-12 PROCEDURE — 85730 THROMBOPLASTIN TIME PARTIAL: CPT

## 2024-09-12 PROCEDURE — 86901 BLOOD TYPING SEROLOGIC RH(D): CPT

## 2024-09-12 PROCEDURE — 83036 HEMOGLOBIN GLYCOSYLATED A1C: CPT

## 2024-09-12 PROCEDURE — 85025 COMPLETE CBC W/AUTO DIFF WBC: CPT

## 2024-09-12 PROCEDURE — 85610 PROTHROMBIN TIME: CPT

## 2024-09-12 PROCEDURE — 36415 COLL VENOUS BLD VENIPUNCTURE: CPT

## 2024-09-12 PROCEDURE — 86900 BLOOD TYPING SEROLOGIC ABO: CPT

## 2024-09-12 PROCEDURE — 93010 ELECTROCARDIOGRAM REPORT: CPT | Performed by: INTERNAL MEDICINE

## 2024-09-12 PROCEDURE — 80053 COMPREHEN METABOLIC PANEL: CPT

## 2024-09-16 ENCOUNTER — ANESTHESIA EVENT (OUTPATIENT)
Dept: PERIOP | Facility: HOSPITAL | Age: 69
DRG: 470 | End: 2024-09-16
Payer: COMMERCIAL

## 2024-09-16 NOTE — PRE-PROCEDURE INSTRUCTIONS
Pre-Surgery Instructions:   Medication Instructions    amLODIPine (NORVASC) 5 mg tablet Take day of surgery.    ascorbic acid (VITAMIN C) 500 MG tablet Hold day of surgery.    celecoxib (CeleBREX) 200 mg capsule Stop taking 3 days prior to surgery.    Cholecalciferol 25 MCG (1000 UT) tablet Already stooped    clotrimazole-betamethasone (LOTRISONE) 1-0.05 % cream Hold day of surgery.    ferrous sulfate 324 (65 Fe) mg Hold day of surgery.    folic acid (FOLVITE) 1 mg tablet Hold day of surgery.    metFORMIN (GLUCOPHAGE) 1000 MG tablet Hold day of surgery.    Multiple Vitamins-Minerals (multivitamin with minerals) tablet Hold day of surgery.    Multiple Vitamins-Minerals (PRESERVISION AREDS PO) Already stopped    Ozempic, 0.25 or 0.5 MG/DOSE, 2 MG/3ML injection pen *Do NOT take for at least 1 week prior to your procedure, including the day of your procedure     rosuvastatin (CRESTOR) 10 MG tablet Take night before surgery    valsartan-hydrochlorothiazide (DIOVAN-HCT) 320-12.5 MG per tablet Hold day of surgery.   TMLJ Patient Education reviewed by surgeon's office/nurse navigator  Instructed to call nurse navigator with any questions  Instructed to bring walker DOS  Patient has Ortho Bag with IS  Patient has Ortho Packet and reviewed information  Pre op instructions per [facility] protocol,medications per surgeon/anesthesia guidelines reviewed and showering instructions per [facility] protocol reviewed by surgeon's office-Patient has hibiclens soap and cloths  Patient taking Folic Acid,Vitamin C,Iron and a Multivitamin and will continue until day prior surgery.  Pt. Verbalized understanding of current visitor restrictions   Instructed to call surgeon with any illness,change in health or covid exposure now till DOS   All medications instructed to take DOS are with sips water only   Instructed to avoid all ASA and OTC Vit/Supp 1 week prior to surgery and to avoid NSAIDs 3 days prior to surgery per anesthesia  guidelines.Tylenol ok to take prn.   No Alcohol 24 hours prior to surgery  Patient aware Lovenox or other Blood Thinner prescribed is for POST OP ONLY  Pt. Verbalized an understanding of all instructions reviewed and offers no concerns at this time.      See Geriatric Assessment below...  Falls (last 6 months): No  Luis Manuel Total Score: 19  PHQ- 9 Depression Scale:1  Nutrition Assessment Score:  METS:4.73  Health goals:  -What are your overall health goals? (quit smoking, wt. loss, rest, decrease stress)    Left knee at 100%  -What brings you strength? (family, friends, Confucianism, health)     and my sister  -What activities are important to you? (exercise, reading, travel, work)    Gardening.

## 2024-09-30 ENCOUNTER — TELEPHONE (OUTPATIENT)
Dept: OBGYN CLINIC | Facility: CLINIC | Age: 69
End: 2024-09-30

## 2024-09-30 NOTE — TELEPHONE ENCOUNTER
S/w pt to offer her a sooner OR day of Wednesday this week at [facility].  Pt unable to move up her surgery as she has some things scheduled that need to be done before her surgery.

## 2024-10-01 RX ORDER — SEMAGLUTIDE 1.34 MG/ML
1 INJECTION, SOLUTION SUBCUTANEOUS
COMMUNITY
Start: 2024-09-30 | End: 2024-10-08

## 2024-10-07 ENCOUNTER — ANESTHESIA (OUTPATIENT)
Dept: PERIOP | Facility: HOSPITAL | Age: 69
DRG: 470 | End: 2024-10-07
Payer: COMMERCIAL

## 2024-10-07 ENCOUNTER — HOSPITAL ENCOUNTER (INPATIENT)
Facility: HOSPITAL | Age: 69
LOS: 2 days | Discharge: HOME HEALTH | DRG: 470 | End: 2024-10-10
Attending: ORTHOPAEDIC SURGERY | Admitting: ORTHOPAEDIC SURGERY
Payer: COMMERCIAL

## 2024-10-07 DIAGNOSIS — E78.5 DYSLIPIDEMIA DUE TO TYPE 2 DIABETES MELLITUS  (HCC): ICD-10-CM

## 2024-10-07 DIAGNOSIS — E11.9 TYPE 2 DIABETES MELLITUS WITHOUT COMPLICATION, WITHOUT LONG-TERM CURRENT USE OF INSULIN (HCC): Chronic | ICD-10-CM

## 2024-10-07 DIAGNOSIS — Z96.652 S/P TOTAL KNEE ARTHROPLASTY, LEFT: Primary | ICD-10-CM

## 2024-10-07 DIAGNOSIS — E11.69 DYSLIPIDEMIA DUE TO TYPE 2 DIABETES MELLITUS  (HCC): ICD-10-CM

## 2024-10-07 PROBLEM — E66.9 OBESITY (BMI 35.0-39.9 WITHOUT COMORBIDITY): Status: ACTIVE | Noted: 2024-04-17

## 2024-10-07 PROBLEM — I10 PRIMARY HYPERTENSION: Status: ACTIVE | Noted: 2021-07-11

## 2024-10-07 LAB
ABO GROUP BLD: NORMAL
GLUCOSE SERPL-MCNC: 105 MG/DL (ref 65–140)
GLUCOSE SERPL-MCNC: 125 MG/DL (ref 65–140)
RH BLD: POSITIVE

## 2024-10-07 PROCEDURE — 99221 1ST HOSP IP/OBS SF/LOW 40: CPT | Performed by: NURSE PRACTITIONER

## 2024-10-07 PROCEDURE — 97167 OT EVAL HIGH COMPLEX 60 MIN: CPT

## 2024-10-07 PROCEDURE — C1776 JOINT DEVICE (IMPLANTABLE): HCPCS | Performed by: ORTHOPAEDIC SURGERY

## 2024-10-07 PROCEDURE — 82948 REAGENT STRIP/BLOOD GLUCOSE: CPT

## 2024-10-07 PROCEDURE — C9290 INJ, BUPIVACAINE LIPOSOME: HCPCS | Performed by: ANESTHESIOLOGY

## 2024-10-07 PROCEDURE — 27447 TOTAL KNEE ARTHROPLASTY: CPT | Performed by: ORTHOPAEDIC SURGERY

## 2024-10-07 PROCEDURE — 97163 PT EVAL HIGH COMPLEX 45 MIN: CPT

## 2024-10-07 PROCEDURE — 27447 TOTAL KNEE ARTHROPLASTY: CPT | Performed by: PHYSICIAN ASSISTANT

## 2024-10-07 PROCEDURE — C1713 ANCHOR/SCREW BN/BN,TIS/BN: HCPCS | Performed by: ORTHOPAEDIC SURGERY

## 2024-10-07 PROCEDURE — 0SRD0J9 REPLACEMENT OF LEFT KNEE JOINT WITH SYNTHETIC SUBSTITUTE, CEMENTED, OPEN APPROACH: ICD-10-PCS | Performed by: ORTHOPAEDIC SURGERY

## 2024-10-07 DEVICE — SMARTSET HV HIGH VISCOSITY BONE CEMENT 40G
Type: IMPLANTABLE DEVICE | Site: KNEE | Status: FUNCTIONAL
Brand: SMARTSET

## 2024-10-07 DEVICE — ATTUNE KNEE SYSTEM FEMORAL CRUCIATE RETAINING SIZE 7 LEFT CEMENTED
Type: IMPLANTABLE DEVICE | Site: KNEE | Status: FUNCTIONAL
Brand: ATTUNE

## 2024-10-07 DEVICE — ATTUNE PATELLA MEDIALIZED DOME 38MM CEMENTED AOX
Type: IMPLANTABLE DEVICE | Site: KNEE | Status: FUNCTIONAL
Brand: ATTUNE

## 2024-10-07 DEVICE — ATTUNE KNEE SYSTEM TIBIAL BASE FIXED BEARING SIZE 8 CEMENTED
Type: IMPLANTABLE DEVICE | Site: KNEE | Status: FUNCTIONAL
Brand: ATTUNE

## 2024-10-07 DEVICE — ATTUNE KNEE SYSTEM TIBIAL INSERT FIXED BEARING MEDIAL STABILIZED LEFT AOX 7, 10MM
Type: IMPLANTABLE DEVICE | Site: KNEE | Status: FUNCTIONAL
Brand: ATTUNE

## 2024-10-07 RX ORDER — OXYCODONE HYDROCHLORIDE 10 MG/1
10 TABLET ORAL EVERY 4 HOURS PRN
Status: DISCONTINUED | OUTPATIENT
Start: 2024-10-07 | End: 2024-10-10 | Stop reason: HOSPADM

## 2024-10-07 RX ORDER — NEOSTIGMINE METHYLSULFATE 1 MG/ML
INJECTION INTRAVENOUS AS NEEDED
Status: DISCONTINUED | OUTPATIENT
Start: 2024-10-07 | End: 2024-10-07

## 2024-10-07 RX ORDER — ENOXAPARIN SODIUM 100 MG/ML
40 INJECTION SUBCUTANEOUS DAILY
Status: DISCONTINUED | OUTPATIENT
Start: 2024-10-09 | End: 2024-10-10 | Stop reason: HOSPADM

## 2024-10-07 RX ORDER — SODIUM CHLORIDE, SODIUM LACTATE, POTASSIUM CHLORIDE, CALCIUM CHLORIDE 600; 310; 30; 20 MG/100ML; MG/100ML; MG/100ML; MG/100ML
INJECTION, SOLUTION INTRAVENOUS CONTINUOUS PRN
Status: DISCONTINUED | OUTPATIENT
Start: 2024-10-07 | End: 2024-10-07

## 2024-10-07 RX ORDER — SODIUM CHLORIDE, SODIUM LACTATE, POTASSIUM CHLORIDE, CALCIUM CHLORIDE 600; 310; 30; 20 MG/100ML; MG/100ML; MG/100ML; MG/100ML
75 INJECTION, SOLUTION INTRAVENOUS CONTINUOUS
Status: DISPENSED | OUTPATIENT
Start: 2024-10-07 | End: 2024-10-08

## 2024-10-07 RX ORDER — TRANEXAMIC ACID 10 MG/ML
1000 INJECTION, SOLUTION INTRAVENOUS ONCE
Status: COMPLETED | OUTPATIENT
Start: 2024-10-07 | End: 2024-10-07

## 2024-10-07 RX ORDER — BUPIVACAINE HYDROCHLORIDE AND EPINEPHRINE 5; 5 MG/ML; UG/ML
INJECTION, SOLUTION PERINEURAL AS NEEDED
Status: DISCONTINUED | OUTPATIENT
Start: 2024-10-07 | End: 2024-10-07 | Stop reason: HOSPADM

## 2024-10-07 RX ORDER — ONDANSETRON 2 MG/ML
4 INJECTION INTRAMUSCULAR; INTRAVENOUS ONCE AS NEEDED
Status: DISCONTINUED | OUTPATIENT
Start: 2024-10-07 | End: 2024-10-07 | Stop reason: HOSPADM

## 2024-10-07 RX ORDER — SENNOSIDES 8.6 MG
650 CAPSULE ORAL EVERY 8 HOURS PRN
Qty: 30 TABLET | Refills: 0 | Status: SHIPPED | OUTPATIENT
Start: 2024-10-07 | End: 2024-11-06

## 2024-10-07 RX ORDER — FOLIC ACID 1 MG/1
1 TABLET ORAL DAILY
Status: DISCONTINUED | OUTPATIENT
Start: 2024-10-08 | End: 2024-10-10 | Stop reason: HOSPADM

## 2024-10-07 RX ORDER — ACETAMINOPHEN 325 MG/1
650 TABLET ORAL EVERY 6 HOURS PRN
Status: DISCONTINUED | OUTPATIENT
Start: 2024-10-07 | End: 2024-10-08

## 2024-10-07 RX ORDER — FENTANYL CITRATE 50 UG/ML
INJECTION, SOLUTION INTRAMUSCULAR; INTRAVENOUS AS NEEDED
Status: DISCONTINUED | OUTPATIENT
Start: 2024-10-07 | End: 2024-10-07

## 2024-10-07 RX ORDER — CEFAZOLIN SODIUM 1 G/50ML
1000 SOLUTION INTRAVENOUS EVERY 8 HOURS
Status: COMPLETED | OUTPATIENT
Start: 2024-10-07 | End: 2024-10-08

## 2024-10-07 RX ORDER — OXYCODONE HYDROCHLORIDE 5 MG/1
5 TABLET ORAL EVERY 4 HOURS PRN
Status: DISCONTINUED | OUTPATIENT
Start: 2024-10-07 | End: 2024-10-10 | Stop reason: HOSPADM

## 2024-10-07 RX ORDER — INSULIN LISPRO 100 [IU]/ML
1-5 INJECTION, SOLUTION INTRAVENOUS; SUBCUTANEOUS
Status: DISCONTINUED | OUTPATIENT
Start: 2024-10-07 | End: 2024-10-10 | Stop reason: HOSPADM

## 2024-10-07 RX ORDER — PRAVASTATIN SODIUM 80 MG/1
80 TABLET ORAL
Status: DISCONTINUED | OUTPATIENT
Start: 2024-10-08 | End: 2024-10-10 | Stop reason: HOSPADM

## 2024-10-07 RX ORDER — CHLORHEXIDINE GLUCONATE ORAL RINSE 1.2 MG/ML
15 SOLUTION DENTAL ONCE
Status: COMPLETED | OUTPATIENT
Start: 2024-10-07 | End: 2024-10-07

## 2024-10-07 RX ORDER — DOCUSATE SODIUM 100 MG/1
100 CAPSULE, LIQUID FILLED ORAL 2 TIMES DAILY
Qty: 10 CAPSULE | Refills: 0 | Status: SHIPPED | OUTPATIENT
Start: 2024-10-07

## 2024-10-07 RX ORDER — ASCORBIC ACID 500 MG
500 TABLET ORAL 2 TIMES DAILY
Status: DISCONTINUED | OUTPATIENT
Start: 2024-10-07 | End: 2024-10-10 | Stop reason: HOSPADM

## 2024-10-07 RX ORDER — BUPIVACAINE HYDROCHLORIDE 5 MG/ML
INJECTION, SOLUTION EPIDURAL; INTRACAUDAL AS NEEDED
Status: DISCONTINUED | OUTPATIENT
Start: 2024-10-07 | End: 2024-10-07

## 2024-10-07 RX ORDER — DEXAMETHASONE SODIUM PHOSPHATE 10 MG/ML
INJECTION, SOLUTION INTRAMUSCULAR; INTRAVENOUS AS NEEDED
Status: DISCONTINUED | OUTPATIENT
Start: 2024-10-07 | End: 2024-10-07

## 2024-10-07 RX ORDER — SODIUM CHLORIDE, SODIUM LACTATE, POTASSIUM CHLORIDE, CALCIUM CHLORIDE 600; 310; 30; 20 MG/100ML; MG/100ML; MG/100ML; MG/100ML
125 INJECTION, SOLUTION INTRAVENOUS CONTINUOUS
Status: DISCONTINUED | OUTPATIENT
Start: 2024-10-07 | End: 2024-10-07

## 2024-10-07 RX ORDER — ROCURONIUM BROMIDE 10 MG/ML
INJECTION, SOLUTION INTRAVENOUS AS NEEDED
Status: DISCONTINUED | OUTPATIENT
Start: 2024-10-07 | End: 2024-10-07

## 2024-10-07 RX ORDER — HYDROMORPHONE HCL/PF 1 MG/ML
SYRINGE (ML) INJECTION AS NEEDED
Status: DISCONTINUED | OUTPATIENT
Start: 2024-10-07 | End: 2024-10-07

## 2024-10-07 RX ORDER — CHLORHEXIDINE GLUCONATE 40 MG/ML
SOLUTION TOPICAL DAILY PRN
Status: DISCONTINUED | OUTPATIENT
Start: 2024-10-07 | End: 2024-10-07 | Stop reason: HOSPADM

## 2024-10-07 RX ORDER — ACETAMINOPHEN 325 MG/1
975 TABLET ORAL ONCE
Status: COMPLETED | OUTPATIENT
Start: 2024-10-07 | End: 2024-10-07

## 2024-10-07 RX ORDER — DOCUSATE SODIUM 100 MG/1
100 CAPSULE, LIQUID FILLED ORAL 2 TIMES DAILY
Status: DISCONTINUED | OUTPATIENT
Start: 2024-10-07 | End: 2024-10-10 | Stop reason: HOSPADM

## 2024-10-07 RX ORDER — ONDANSETRON 2 MG/ML
INJECTION INTRAMUSCULAR; INTRAVENOUS AS NEEDED
Status: DISCONTINUED | OUTPATIENT
Start: 2024-10-07 | End: 2024-10-07

## 2024-10-07 RX ORDER — MIDAZOLAM HYDROCHLORIDE 2 MG/2ML
INJECTION, SOLUTION INTRAMUSCULAR; INTRAVENOUS AS NEEDED
Status: DISCONTINUED | OUTPATIENT
Start: 2024-10-07 | End: 2024-10-07

## 2024-10-07 RX ORDER — SENNOSIDES 8.6 MG
1 TABLET ORAL DAILY
Status: DISCONTINUED | OUTPATIENT
Start: 2024-10-08 | End: 2024-10-10 | Stop reason: HOSPADM

## 2024-10-07 RX ORDER — METOPROLOL TARTRATE 1 MG/ML
INJECTION, SOLUTION INTRAVENOUS AS NEEDED
Status: DISCONTINUED | OUTPATIENT
Start: 2024-10-07 | End: 2024-10-07

## 2024-10-07 RX ORDER — ONDANSETRON 4 MG/1
4 TABLET, FILM COATED ORAL EVERY 8 HOURS PRN
Qty: 5 TABLET | Refills: 0 | Status: SHIPPED | OUTPATIENT
Start: 2024-10-07

## 2024-10-07 RX ORDER — METHOCARBAMOL 500 MG/1
500 TABLET, FILM COATED ORAL 3 TIMES DAILY PRN
Qty: 30 TABLET | Refills: 0 | Status: SHIPPED | OUTPATIENT
Start: 2024-10-07 | End: 2024-10-10

## 2024-10-07 RX ORDER — MAGNESIUM HYDROXIDE 1200 MG/15ML
LIQUID ORAL AS NEEDED
Status: DISCONTINUED | OUTPATIENT
Start: 2024-10-07 | End: 2024-10-07 | Stop reason: HOSPADM

## 2024-10-07 RX ORDER — OXYCODONE HYDROCHLORIDE 5 MG/1
5 TABLET ORAL EVERY 6 HOURS PRN
Qty: 30 TABLET | Refills: 0 | Status: SHIPPED | OUTPATIENT
Start: 2024-10-07 | End: 2024-10-17

## 2024-10-07 RX ORDER — ONDANSETRON 2 MG/ML
4 INJECTION INTRAMUSCULAR; INTRAVENOUS EVERY 6 HOURS PRN
Status: DISCONTINUED | OUTPATIENT
Start: 2024-10-07 | End: 2024-10-10 | Stop reason: HOSPADM

## 2024-10-07 RX ORDER — FENTANYL CITRATE/PF 50 MCG/ML
25 SYRINGE (ML) INJECTION
Status: COMPLETED | OUTPATIENT
Start: 2024-10-07 | End: 2024-10-07

## 2024-10-07 RX ORDER — CALCIUM CARBONATE 500 MG/1
1000 TABLET, CHEWABLE ORAL DAILY PRN
Status: DISCONTINUED | OUTPATIENT
Start: 2024-10-07 | End: 2024-10-10 | Stop reason: HOSPADM

## 2024-10-07 RX ORDER — INSULIN LISPRO 100 [IU]/ML
1-6 INJECTION, SOLUTION INTRAVENOUS; SUBCUTANEOUS
Status: DISCONTINUED | OUTPATIENT
Start: 2024-10-08 | End: 2024-10-10 | Stop reason: HOSPADM

## 2024-10-07 RX ORDER — HYDROMORPHONE HCL/PF 1 MG/ML
0.5 SYRINGE (ML) INJECTION
Status: DISCONTINUED | OUTPATIENT
Start: 2024-10-07 | End: 2024-10-07 | Stop reason: HOSPADM

## 2024-10-07 RX ORDER — HYDROMORPHONE HCL/PF 1 MG/ML
0.5 SYRINGE (ML) INJECTION EVERY 2 HOUR PRN
Status: DISCONTINUED | OUTPATIENT
Start: 2024-10-07 | End: 2024-10-09

## 2024-10-07 RX ORDER — GLYCOPYRROLATE 0.2 MG/ML
INJECTION INTRAMUSCULAR; INTRAVENOUS AS NEEDED
Status: DISCONTINUED | OUTPATIENT
Start: 2024-10-07 | End: 2024-10-07

## 2024-10-07 RX ORDER — ENOXAPARIN SODIUM 100 MG/ML
40 INJECTION SUBCUTANEOUS ONCE
Status: COMPLETED | OUTPATIENT
Start: 2024-10-08 | End: 2024-10-08

## 2024-10-07 RX ORDER — CEFAZOLIN SODIUM 2 G/50ML
2000 SOLUTION INTRAVENOUS ONCE
Status: COMPLETED | OUTPATIENT
Start: 2024-10-07 | End: 2024-10-07

## 2024-10-07 RX ORDER — PROPOFOL 10 MG/ML
INJECTION, EMULSION INTRAVENOUS AS NEEDED
Status: DISCONTINUED | OUTPATIENT
Start: 2024-10-07 | End: 2024-10-07

## 2024-10-07 RX ORDER — PHENYLEPHRINE HCL IN 0.9% NACL 1 MG/10 ML
SYRINGE (ML) INTRAVENOUS AS NEEDED
Status: DISCONTINUED | OUTPATIENT
Start: 2024-10-07 | End: 2024-10-07

## 2024-10-07 RX ORDER — TRANEXAMIC ACID 100 MG/ML
INJECTION, SOLUTION INTRAVENOUS AS NEEDED
Status: DISCONTINUED | OUTPATIENT
Start: 2024-10-07 | End: 2024-10-07 | Stop reason: HOSPADM

## 2024-10-07 RX ADMIN — OXYCODONE HYDROCHLORIDE 10 MG: 5 TABLET ORAL at 13:44

## 2024-10-07 RX ADMIN — BUPIVACAINE 20 ML: 13.3 INJECTION, SUSPENSION, LIPOSOMAL INFILTRATION at 08:55

## 2024-10-07 RX ADMIN — DOCUSATE SODIUM 100 MG: 100 CAPSULE, LIQUID FILLED ORAL at 20:46

## 2024-10-07 RX ADMIN — FENTANYL CITRATE 25 MCG: 50 INJECTION INTRAMUSCULAR; INTRAVENOUS at 12:42

## 2024-10-07 RX ADMIN — OXYCODONE HYDROCHLORIDE AND ACETAMINOPHEN 500 MG: 500 TABLET ORAL at 20:46

## 2024-10-07 RX ADMIN — OXYCODONE HYDROCHLORIDE 10 MG: 5 TABLET ORAL at 17:58

## 2024-10-07 RX ADMIN — FENTANYL CITRATE 50 MCG: 50 INJECTION INTRAMUSCULAR; INTRAVENOUS at 10:55

## 2024-10-07 RX ADMIN — PROPOFOL 40 MG: 10 INJECTION, EMULSION INTRAVENOUS at 10:48

## 2024-10-07 RX ADMIN — FENTANYL CITRATE 25 MCG: 50 INJECTION INTRAMUSCULAR; INTRAVENOUS at 12:58

## 2024-10-07 RX ADMIN — DEXAMETHASONE SODIUM PHOSPHATE 10 MG: 10 INJECTION, SOLUTION INTRAMUSCULAR; INTRAVENOUS at 10:30

## 2024-10-07 RX ADMIN — SODIUM CHLORIDE, SODIUM LACTATE, POTASSIUM CHLORIDE, AND CALCIUM CHLORIDE: .6; .31; .03; .02 INJECTION, SOLUTION INTRAVENOUS at 08:53

## 2024-10-07 RX ADMIN — FENTANYL CITRATE 50 MCG: 50 INJECTION INTRAMUSCULAR; INTRAVENOUS at 10:00

## 2024-10-07 RX ADMIN — ROCURONIUM BROMIDE 50 MG: 10 INJECTION INTRAVENOUS at 10:22

## 2024-10-07 RX ADMIN — MIDAZOLAM 1 MG: 1 INJECTION INTRAMUSCULAR; INTRAVENOUS at 10:00

## 2024-10-07 RX ADMIN — CHLORHEXIDINE GLUCONATE 15 ML: 1.2 RINSE ORAL at 08:28

## 2024-10-07 RX ADMIN — Medication 100 MCG: at 11:36

## 2024-10-07 RX ADMIN — ACETAMINOPHEN 325MG 975 MG: 325 TABLET ORAL at 08:28

## 2024-10-07 RX ADMIN — HYDROMORPHONE HYDROCHLORIDE 1 MG: 1 INJECTION, SOLUTION INTRAMUSCULAR; INTRAVENOUS; SUBCUTANEOUS at 10:49

## 2024-10-07 RX ADMIN — DEXMEDETOMIDINE 8 MCG: 100 INJECTION, SOLUTION INTRAVENOUS at 11:03

## 2024-10-07 RX ADMIN — CEFAZOLIN SODIUM 1000 MG: 1 SOLUTION INTRAVENOUS at 21:12

## 2024-10-07 RX ADMIN — FENTANYL CITRATE 50 MCG: 50 INJECTION INTRAMUSCULAR; INTRAVENOUS at 12:10

## 2024-10-07 RX ADMIN — ONDANSETRON 4 MG: 2 INJECTION INTRAMUSCULAR; INTRAVENOUS at 10:31

## 2024-10-07 RX ADMIN — MIDAZOLAM 2 MG: 1 INJECTION INTRAMUSCULAR; INTRAVENOUS at 08:48

## 2024-10-07 RX ADMIN — MIDAZOLAM 1 MG: 1 INJECTION INTRAMUSCULAR; INTRAVENOUS at 10:27

## 2024-10-07 RX ADMIN — Medication 100 MCG: at 11:38

## 2024-10-07 RX ADMIN — PROPOFOL 200 MG: 10 INJECTION, EMULSION INTRAVENOUS at 10:22

## 2024-10-07 RX ADMIN — OXYCODONE HYDROCHLORIDE 10 MG: 5 TABLET ORAL at 22:41

## 2024-10-07 RX ADMIN — TRANEXAMIC ACID 1000 MG: 10 INJECTION, SOLUTION INTRAVENOUS at 10:27

## 2024-10-07 RX ADMIN — NEOSTIGMINE METHYLSULFATE 3 MG: 1 INJECTION INTRAVENOUS at 12:04

## 2024-10-07 RX ADMIN — ACETAMINOPHEN 325MG 650 MG: 325 TABLET ORAL at 13:43

## 2024-10-07 RX ADMIN — PROPOFOL 40 MG: 10 INJECTION, EMULSION INTRAVENOUS at 10:55

## 2024-10-07 RX ADMIN — FENTANYL CITRATE 25 MCG: 50 INJECTION INTRAMUSCULAR; INTRAVENOUS at 12:47

## 2024-10-07 RX ADMIN — FENTANYL CITRATE 25 MCG: 50 INJECTION INTRAMUSCULAR; INTRAVENOUS at 13:06

## 2024-10-07 RX ADMIN — METOROPROLOL TARTRATE 1 MG: 5 INJECTION, SOLUTION INTRAVENOUS at 11:00

## 2024-10-07 RX ADMIN — SODIUM CHLORIDE, SODIUM LACTATE, POTASSIUM CHLORIDE, AND CALCIUM CHLORIDE: .6; .31; .03; .02 INJECTION, SOLUTION INTRAVENOUS at 11:19

## 2024-10-07 RX ADMIN — FENTANYL CITRATE 50 MCG: 50 INJECTION INTRAMUSCULAR; INTRAVENOUS at 08:48

## 2024-10-07 RX ADMIN — CEFAZOLIN SODIUM 2000 MG: 2 SOLUTION INTRAVENOUS at 10:10

## 2024-10-07 RX ADMIN — BUPIVACAINE HYDROCHLORIDE 10 ML: 5 INJECTION, SOLUTION EPIDURAL; INTRACAUDAL at 08:55

## 2024-10-07 RX ADMIN — SODIUM CHLORIDE, SODIUM LACTATE, POTASSIUM CHLORIDE, AND CALCIUM CHLORIDE 75 ML/HR: .6; .31; .03; .02 INJECTION, SOLUTION INTRAVENOUS at 20:46

## 2024-10-07 RX ADMIN — GLYCOPYRROLATE 0.4 MG: 0.2 INJECTION INTRAMUSCULAR; INTRAVENOUS at 12:04

## 2024-10-07 NOTE — PLAN OF CARE
Problem: OCCUPATIONAL THERAPY ADULT  Goal: Performs self-care activities at highest level of function for planned discharge setting.  See evaluation for individualized goals.  Description: Treatment Interventions: ADL retraining, Functional transfer training, Endurance training, Patient/family training, Equipment evaluation/education, Compensatory technique education, Energy conservation          See flowsheet documentation for full assessment, interventions and recommendations.   Note: Limitation: Decreased ADL status, Decreased Safe judgement during ADL, Decreased endurance, Decreased self-care trans, Decreased high-level ADLs (pain, balance, fxnl mobility, act john, fxnl reach, standing john, strength, and fxnl sitting john)  Prognosis: Good  Assessment: Pt is a 69 y.o. female seen for OT evaluation s/p admit to [facility] on 10/7/2024 w/S/P total knee arthroplasty, left. Prior to admission, pt was living with spouse in a 1 level house, (I) with ADLs, (I) with light IADLs, (-) falls, (+) . Personal and environmental factors affecting patient at time of evaluation include limited social support, inaccessible home environment, inaccessible bathroom environment, difficulty completing ADLs, and difficulty completing IADLs. Personal factors supporting patient at time of evaluation include age, (I) PLOF, supportive spouse, attitude towards recovery, and FFSU. Based upon this evaluation, pt is functioning below baseline. Pt will benefit from continued skilled inpatient OT to maximize safety, level of independence and overall performance in ADLs, functional transfers, functional mobility to return to functional baseline/highest level of function.     Rehab Resource Intensity Level, OT: (S) II (Moderate Resource Intensity) (AT THIS TIME pending medical optimization, pain control, and progress towards goals.)     Sayra Tomas, OTD, OTR/L  [place] License [id number]  [place] License [id number]

## 2024-10-07 NOTE — DISCHARGE INSTR - AVS FIRST PAGE
Discharge Instructions - Orthopedics  Noris Heck 69 y.o. female MRN: [id number]  Unit/Bed#: AN OR MAIN    Weight Bearing Status:                                           Weight Bearing as tolerated to the left lower extremity.    DVT prophylaxis:  Complete course of Aspirin 81 mg twice daily x 35 days from date of surgery as directed    Pain:  Start oxycodone 5 mg every 6 hrs after last dose taken after surgery for 1 day  Transition to oxycodone 5 mg every 6 hours as needed    Showering Instructions:   Do not shower until 5 days from date of surgery  Do not soak your incision(Tubs, Jacuzzi's, pools, ext)    Dressing Instructions:   May remove dressing 5 days from date of surgery OR may leave dressing in place until follow up office visit 2 weeks from surgery date  Keep dressing/incision clean and intact until follow up appointment.  Do not apply any creams or ointments/lotions to your incisions including antibiotic ointment, peroxide    Driving Instructions:  No driving until cleared by Orthopaedic Surgery.    PT/OT:  Continue PT/OT on outpatient basis as directed  Continue motion and strengthening exercises while at home    Appt Instructions:   If you do not have your appointment, please call the clinic at [phone]  Otherwise followup as scheduled    Contact the office sooner if you experience any increased numbness/tingling in the extremities.         Hypertension: your BP has been remaining normal here without your BP meds, hence we recommend you do not take your BP meds at home right now. Try to check BP at home 1-2 times a day & write it down. Make an appointment to see your PCP within a week & follow up your BP with them. If BP persistently > 150 at home you can start taking your amlodipine

## 2024-10-07 NOTE — OCCUPATIONAL THERAPY NOTE
Occupational Therapy Evaluation     Patient Name: Noris Heck  Today's Date: 10/7/2024  Problem List  Principal Problem:    S/P total knee arthroplasty, left    Past Medical History  Past Medical History:   Diagnosis Date    Arthritis     Diabetes mellitus (HCC)     Hyperlipidemia     Hypertension      Past Surgical History  Past Surgical History:   Procedure Laterality Date    ANKLE FUSION Right     Patient states about 4 surgeries on ankle    CERVICAL POLYPECTOMY      COLONOSCOPY      DILATION AND CURETTAGE OF UTERUS      THYROID SURGERY      nodule removed        Patient's identity confirmed via 2 patient identifiers (full name and ) at start of session      10/07/24 1402   OT Last Visit   OT Visit Date 10/07/24   Note Type   Note type Evaluation   Pain Assessment   Pain Assessment Tool 0-10   Pain Score 10 - Worst Possible Pain   Pain Location/Orientation Orientation: Left;Location: Knee  (also noted stabbing pain at calf that improved w/ mobility)   Pain Onset/Description Onset: Ongoing;Descriptor: Aching   Effect of Pain on Daily Activities limits activity tolerance, comfort, speed of ADLs/mobility   Patient's Stated Pain Goal No pain   Hospital Pain Intervention(s) Repositioned;Ambulation/increased activity;Emotional support   Restrictions/Precautions   Weight Bearing Precautions Per Order Yes   LLE Weight Bearing Per Order WBAT  (s/p elective L TKA 10/7 w/ Dr. Yepez)   Other Precautions WBS;Multiple lines;Fall Risk;Pain  (IV)   Home Living   Type of Home House   Home Layout One level;Performs ADLs on one level;Able to live on main level with bedroom/bathroom;Stairs to enter with rails  (2 BERHANE w/ railing both sides)   Bathroom Shower/Tub Walk-in shower  (lip to enter)   Bathroom Toilet Standard   Bathroom Equipment Shower chair;Grab bars in shower;Commode   Bathroom Accessibility Accessible   Home Equipment Walker;Cane;Wheelchair-manual   Prior Function   Level of [place] Independent with  "ADLs;Independent with functional mobility;Independent with IADLS  (shared IADLs w/ spouse)   Lives With Spouse   Receives Help From Family   IADLs Independent with driving;Independent with meal prep;Independent with medication management   Falls in the last 6 months 0   Vocational Retired  ( for [employer])   Comments Pt reports she is (I) w/ ADLs PTA, occasional use of SPC. Shares IADLs w/ spouse   Lifestyle   Autonomy Pt lives w/ spouse in a 1 level house and is (I) w/ ADLs PTA, no AD vs SPC, (-) falls, (+)    Reciprocal Relationships Supportive spouse   Service to Others Retired  for [employer]   Intrinsic Gratification Pt enjoys walking, used to enjoy hiking w/ spouse   General   Additional Pertinent History Pt admitted for elective L TKA. PMH includes: obesity, T2DM. Reports hx of R TKA 18 years ago and had to stay in the hospital 4 days   Family/Caregiver Present Yes  (Spouse)   Subjective   Subjective \"I feel so dizzy\"   ADL   Where Assessed Edge of bed   Eating Assistance 7  Independent   Eating Deficit Setup;Beverage management   Grooming Assistance 6  Modified Independent   UB Bathing Assistance 5  Supervision/Setup   LB Bathing Assistance 2  Maximal Assistance   UB Dressing Assistance 5  Supervision/Setup   LB Dressing Assistance 2  Maximal Assistance   LB Dressing Deficit Setup;Don/doff R sock;Don/doff L sock   Toileting Assistance  2  Maximal Assistance   Bed Mobility   Supine to Sit 4  Minimal assistance   Additional items Assist x 1;HOB elevated;Increased time required;Verbal cues;LE management  (A to manage LLE; significantly increased time to achieve sitting EOB due to pain, dizziness and need for multiple rest breaks)   Sit to Supine 4  Minimal assistance   Additional items Assist x 1;Increased time required;Verbal cues;LE management   Additional Comments Able to tolerate sitting EOB for approx 10 minutes. Pt endorses significant dizziness that does not change despite extended time " sitting EOB. BP supine 149/65, sitting EOB: 136/66. Retured to supine at end of session   Transfers   Sit to Stand 4  Minimal assistance   Additional items Increased time required;Verbal cues;Assist x 2   Stand to Sit 4  Minimal assistance   Additional items Increased time required;Verbal cues;Assist x 2   Additional Comments x 1 STS from EOB to RW, min A x 1 for physical A to rise and second person at L knee for stability. Reports consistent dizziness, able to tolerate approx 1 minute in stance.   Functional Mobility   Functional Mobility 4  Minimal assistance  (A x 2)   Additional Comments Few side steps to R w/ RW and min A x 1. Required second person present at L knee for stability. Limited by dizziness/pain.   Additional items Rolling walker   Balance   Static Sitting Fair +   Dynamic Sitting Fair   Static Standing Poor +   Dynamic Standing Poor   Activity Tolerance   Activity Tolerance Patient limited by fatigue;Patient limited by pain  (dizziness)   Medical Staff Made Aware Care coordinated w/ PT Roma   Nurse Made Aware yes, RN Deepak rutherford to see pt and udpated   RUE Assessment   RUE Assessment WFL   LUE Assessment   LUE Assessment WFL   Hand Function   Gross Motor Coordination Functional   Fine Motor Coordination Functional   Sensation   Light Touch No apparent deficits   Vision-Basic Assessment   Current Vision Wears glasses all the time   Cognition   Overall Cognitive Status WFL   Arousal/Participation Alert;Cooperative   Attention Within functional limits   Orientation Level Oriented X4   Memory Within functional limits   Following Commands Follows multistep commands with increased time or repetition   Comments Pleasant and agreeable. Lethargic. Limited by dizziness and nausea.   Assessment   Limitation Decreased ADL status;Decreased Safe judgement during ADL;Decreased endurance;Decreased self-care trans;Decreased high-level ADLs  (pain, balance, fxnl mobility, act john, fxnl reach, standing john, strength,  and fxnl sitting john)   Prognosis Good   Assessment Pt is a 69 y.o. female seen for OT evaluation s/p admit to [facility] on 10/7/2024 w/S/P total knee arthroplasty, left. Prior to admission, pt was living with spouse in a 1 level house, (I) with ADLs, (I) with light IADLs, (-) falls, (+) . Personal and environmental factors affecting patient at time of evaluation include limited social support, inaccessible home environment, inaccessible bathroom environment, difficulty completing ADLs, and difficulty completing IADLs. Personal factors supporting patient at time of evaluation include age, (I) PLOF, supportive spouse, attitude towards recovery, and FFSU. Based upon this evaluation, pt is functioning below baseline. Pt will benefit from continued skilled inpatient OT to maximize safety, level of independence and overall performance in ADLs, functional transfers, functional mobility to return to functional baseline/highest level of function.   Goals   Patient Goals to have less pain in my knee   LTG Time Frame 10-14   Long Term Goal #1 see goals below   Plan   Treatment Interventions ADL retraining;Functional transfer training;Endurance training;Patient/family training;Equipment evaluation/education;Compensatory technique education;Energy conservation   Goal Expiration Date 10/17/24   OT Treatment Day 0   OT Frequency 3-5x/wk   Discharge Recommendation   Rehab Resource Intensity Level, OT (S)  II (Moderate Resource Intensity)  (AT THIS TIME pending medical optimization, pain control, and progress towards goals.)   AM-PAC Daily Activity Inpatient   Lower Body Dressing 2   Bathing 2   Toileting 2   Upper Body Dressing 4   Grooming 4   Eating 4   Daily Activity Raw Score 18   Daily Activity Standardized Score (Calc for Raw Score >=11) 38.66   AM-PAC Applied Cognition Inpatient   Following a Speech/Presentation 4   Understanding Ordinary Conversation 4   Taking Medications 4   Remembering Where Things Are  Placed or Put Away 4   Remembering List of 4-5 Errands 4   Taking Care of Complicated Tasks 4   Applied Cognition Raw Score 24   Applied Cognition Standardized Score 62.21   End of Consult   Patient Position at End of Consult Supine;All needs within reach   Nurse Communication Nurse aware of consult     GOALS:    *Goals established to promote patient goal of to have less pain in my L knee:      *Patient will perform grooming tasks standing at sink with (S) in order to increase overall independence    *LB ADL with (S) using AE prn for inc'd independence with self care    *Toileting with (S) for clothing management and hygiene to increase hygiene/thoroughness in order to reduce caregiver burden    *Pt will demonstrate use of long handled AE during 100% of tx sessions for increased ADL safety and independence following D/C     *ADL transfers with (S) for inc'd independence with ADLs/purposeful tasks    *Bed mobility- (S) for inc'd independence to manage own comfort and initiate EOB & OOB purposeful tasks    *Patient will increase functional mobility to and from bathroom with rolling walker with supervision to increase independence with toileting    *Patient will increase OOB/sitting tolerance to 2-4 hours per day to increase participation in self-care and leisure tasks with no s/s of exertion.     *Increase stand tolerance x 3-5  m for inc'd tolerance with standing purposeful tasks including grooming/self-care    *Patient will improve functional activity tolerance to 15 minutes of sustained functional tasks to increase participation in basic self-care and decrease assistance level.     *Caregiver will demonstrate good body mechanics and safe technique when assisting pt during functional ADLs/transfers to maximize safety upon DC.    The patient's raw score on the -PAC Daily Activity Inpatient Short Form is 18. A raw score of less than 19 suggests the patient may benefit from discharge to post-acute rehabilitation  services. Please also refer to the recommendation of the Occupational Therapist for safe discharge planning.     Pt seen as a co-eval with PT due to the patient's co-morbidities and clinically unstable presentation indicated by chart review. During session, pt benefited from two skilled therapists due to extensive physical assistance to achieve transitional movements and pt's decreased activity tolerance.      MINI Fuller, OTR/L    [place] License [id number]  [place] License [id number]

## 2024-10-07 NOTE — ANESTHESIA PREPROCEDURE EVALUATION
Procedure:  ARTHROPLASTY KNEE TOTAL (Left: Knee)    Relevant Problems   No relevant active problems     HTN, HLD  DM   Arthritis  BMI 38    On weekly ozempic    Physical Exam    Airway    Mallampati score: II  TM Distance: >3 FB  Neck ROM: full     Dental       Cardiovascular  Cardiovascular exam normal    Pulmonary  Pulmonary exam normal     Other Findings  post-pubertal.      Anesthesia Plan  ASA Score- 3     Anesthesia Type- spinal with ASA Monitors.         Additional Monitors:     Airway Plan:     Comment: + PNB.       Plan Factors-    Chart reviewed. EKG reviewed.  Existing labs reviewed. Patient summary reviewed.          Obstructive sleep apnea risk education given perioperatively.        Induction- intravenous.    Postoperative Plan-         Informed Consent- Anesthetic plan and risks discussed with patient.  I personally reviewed this patient with the CRNA. Discussed and agreed on the Anesthesia Plan with the CRNA..

## 2024-10-07 NOTE — PHYSICAL THERAPY NOTE
PHYSICAL THERAPY EVALUATION  NAME:  Noris Heck  DATE: 10/07/24    AGE:   69 y.o.  Mrn:   [id number]  Principal problem: Principal Problem:    S/P total knee arthroplasty, left      Vitals:    10/07/24 1440 10/07/24 1500 10/07/24 1600 10/07/24 1715   BP: 122/57 119/62 135/75 121/56   BP Location: Right arm Right arm     Pulse: 81 87 85 91   Resp: 18 20 19 21   Temp:       TempSrc:       SpO2: 94% 99% 99% 95%   Weight:       Height:           Length Of Stay: 0  Performed at least 2 patient identifiers during session: Name and Birthday  PHYSICAL THERAPY EVALUATION :    10/07/24 1445   PT Last Visit   PT Visit Date 10/07/24   Note Type   Note type Evaluation   Pain Assessment   Pain Assessment Tool 0-10   Pain Score 10 - Worst Possible Pain   Pain Location/Orientation Orientation: Left;Location: Knee  (and L calf)   Patient's Stated Pain Goal No pain   Hospital Pain Intervention(s) Medication (See MAR);Repositioned;Cold applied;Ambulation/increased activity;Elevated;Emotional support  (eletrocool @ knee)   Restrictions/Precautions   Weight Bearing Precautions Per Order Yes   LLE Weight Bearing Per Order WBAT  (s/p elective L TKA 10/7 w/ Dr. Yepez)   Other Precautions WBS;Multiple lines;Fall Risk;Pain  (IV)   Home Living   Type of Home House   Home Layout One level;Performs ADLs on one level;Able to live on main level with bedroom/bathroom;Stairs to enter with rails  (2 BERHANE w/ railing both sides, able to reach)   Home Equipment Walker;Cane;Wheelchair-manual   Additional Comments Scheduled for OPPT on 10/10/24   Prior Function   Level of [place] Independent with ADLs;Independent with functional mobility;Independent with IADLS  (shared IADLs w/ spouse)   Lives With Spouse   Receives Help From Family   IADLs Independent with driving;Independent with meal prep;Independent with medication management   Falls in the last 6 months 0   Vocational Retired   Comments Pt reports she is (I) w/ ADLs PTA, occasional use  "of SPC. Shares IADLs w/ spouse   General   Additional Pertinent History (S)  Procedure: left total knee arthroplasty. Per OPPT note prior, Pt's L knee rom 5-108; flexion 4, extension 3+ limited by pain   Family/Caregiver Present No   Cognition   Overall Cognitive Status WFL   Arousal/Participation Lethargic   Attention Within functional limits   Orientation Level Oriented X4   Memory Within functional limits   Following Commands Follows multistep commands with increased time or repetition   Subjective   Subjective Pt pleasant, but reports \"lighthead ada with upright positioning\". fear/retreat w/ mobility.   RUE Assessment   RUE Assessment WFL   LUE Assessment   LUE Assessment WFL   RLE Assessment   RLE Assessment   (WFL knee flexion and ext)   Strength RLE   R Knee Extension 4+/5   R Ankle Dorsiflexion 4+/5   LLE Assessment   LLE Assessment   (L knee ext close to zero. L knee flexion <90, but + bukly dressing promoting knee ext)   Strength LLE   L Hip Flexion   (<3)   L Knee Extension 3-/5   L Ankle Dorsiflexion 4-/5   Vision-Basic Assessment   Current Vision Wears glasses all the time   Light Touch   RLE Light Touch Grossly intact   LLE Light Touch Grossly intact   Bed Mobility   Supine to Sit   (Pt sitting on EOB  \"dangling\" prior to sit<>stand transfer--PT does not have level thigh prior to standing at lowest deck height of bed.)   Sit to Supine 4  Minimal assistance   Additional items Assist x 1;Bedrails;HOB elevated;Increased time required;Verbal cues;LE management  (for LE management @ completion of approach)   Transfers   Sit to Stand 4  Minimal assistance   Additional items Assist x 2;Increased time required;Verbal cues;Armrests  (including for balance and LLE stabilization)   Stand to Sit 4  Minimal assistance   Additional items Assist x 2;Increased time required;Verbal cues;Armrests   Ambulation/Elevation   Gait pattern Decreased L stance;Antalgic;Excessively slow;Step to   Gait Assistance 4  Minimal " "assist   Additional items Assist x 2;Verbal cues;Tactile cues  (A of 2 including for balance, walker management,  and LLE stabilization)   Assistive Device Rolling walker   Distance 2' sidestepping toward HOB, limited distances due to lightheadedness   Stair Management Assistance Not tested   Wheelchair Activities   Propulsion   (LLE positioned w/ roll under pt's ankle to promote terminal extension--pt reports improvements in pain. Pt also instructed to perform quad sets and ankle pumps tonight.)   Balance   Static Sitting Good   Static Standing Poor +  (standing tolerance 1 min w/ BUE support of walker)   Ambulatory Poor +   Endurance Deficit   Endurance Deficit Yes   Endurance Deficit Description limited standing tolerance time, \"lightheadedness\" with upright positioning, pallor while upright   Activity Tolerance   Activity Tolerance Patient limited by fatigue;Patient limited by pain  (lightheadedness)   Medical Staff Made Aware care coordination w/ Bibiana from OT   Nurse Made Aware spoke to RN before and after session, including  patient's mobility limitations and lightheadedness     Assessment:   Pt is a 69 y.o. female seen for PT evaluation s/p admit to [facility] on 10/7/2024 w/ S/P total knee arthroplasty, left.  Pt seen in [facility]U POD#0. Order placed for PT.      Prior to admission: Pt Lived w/ spouse and ranch home with 2 stairs to enter w.railing.  Patient did not require any physical assistance prior to admission and intermittently use to single-point cane.  Upon evaluation: Pt needed assist of 1 for bed mobility, min assist of 2 for sit to stand transfers and steppage ambulation towards the head of the bed.      Pt's clinical presentation is currently unstable/unpredictable given the functional mobility deficits above, especially (but not limited to) decreased ROM, pain, and decreased functional mobility tolerance, and combined with medical complications including fear/retreat and subjective reports " of lightheadedness without substantially low SPB.  Pt IS NOT at her mobility baseline.  She is at risk for falls based on impaired balance, obesity, and lightheadedness.       During this admission, pt would benefit from continued skilled inpatient PT in the acute care setting in order to address deficits as defined above to maximize function and mobility.      Recommendations:    From a PT standpoint, recommend next several sessions focus on continued mobility trials with rolling walker for ambulation, Ther Ex to tolerance, stair performance, confirmation of HEP.     Prognosis Fair   Problem List Decreased strength;Decreased endurance;Decreased range of motion;Impaired balance;Decreased mobility;Obesity;Decreased skin integrity;Orthopedic restrictions;Pain;Decreased coordination  (Gait deviations , lightheadedness)   Barriers to Discharge Inaccessible home environment   Goals   Patient Goals To have less pain in my knee, be able to walk without pain/restrictions   STG Expiration Date 10/17/24   Short Term Goal #1 Goals: Pt will: Perform rolling  and supine<>sit bed mobility tasks with no more than min A to prepare for transfers and reposition in bed. Perform transfers with Supervision to promote proper hand placement and approach. Perform ambulation with LRAD for up to 50 feet with Supervision to promote proper use of assistive device. Perform 2 stairs w/ railing +/- DME and w/no more than min A to return to home with BERHANE. Perform at least 2 HEP w/o physical A to demonstrate compliance w/therex and improve ease of transfers and improve gait quality.   Perform TUG w/ DME within 30 seconds to promote DC to home as opposed to post acute rehab.   PT Treatment Day 1   Plan   Treatment/Interventions Functional transfer training;Elevations;LE strengthening/ROM;Therapeutic exercise;Endurance training;Patient/family training;Equipment eval/education;Bed mobility;Gait training;Cognitive reorientation;Spoke to nursing;OT   PT  Frequency Twice a day  (starting tomorrow)   Discharge Recommendation   Rehab Resource Intensity Level, PT (S)  II (Moderate Resource Intensity)  (Pending medical optimization with anticipation that patient will progress to level 3 pending mobility progression including  stairs)   Equipment Recommended Walker   Walker Package Recommended Wheeled walker   Additional Comments 2 Personal factors affecting pt at time of IE include: steps to enter environment, advanced age, past experience, behavioral pattern, inability to ambulate household distances, and inability to navigate community distances.   [facility] Basic Mobility Inpatient   Turning in Flat Bed Without Bedrails 3   Lying on Back to Sitting on Edge of Flat Bed Without Bedrails 3   Moving Bed to Chair 2   Standing Up From Chair Using Arms 2   Walk in Room 1   Climb 3-5 Stairs With Railing 1   Basic Mobility Inpatient Raw Score 12   Basic Mobility Standardized Score 32.23   [facility] Highest Level Of Mobility   -HLM Goal 4: Move to chair/commode   [facility]-HLM Achieved   (Pt could have transition to a chair, but no adequate postop sitting surface available in APU)   Pt requires PT /OT co-eval due to required skilled interventions of at least 2 clinicians for care delivery and mobility challenge, medical complexity, limited activity tolerance, lightheadedness.   PT and OT goals addressed separately.   (Please find full objective findings from PT assessment regarding body systems outlined above).     The patient's [facility] Basic Mobility Inpatient Short Form Raw Score is 12. A Raw score of less than or equal to 16 suggests the patient may benefit from discharge to post-acute rehabilitation services, which MAY NOT coincide with EVENTUAL PT recommendations.     However please refer to therapist recommendation for discharge planning given other factors that may influence destination.     Adapted from Minh SAEZ, Saul MONTES, Yuliana MONTES, Teresa MONTES. [facility]  "“6-Clicks” Basic Mobility and Daily Activity Scores With Discharge Destination. Physical Therapy, 2021;101:1-9. DOI: 10.1093/ptj/[id number]    Portions of the record may have been created with voice recognition software.  Occasional wrong word or \"sound a like\" substitutions may have occurred due to the inherent limitations of voice recognition software.  Read the chart carefully and recognize, using context, where substitutions have occurred    "

## 2024-10-07 NOTE — ANESTHESIA PROCEDURE NOTES
Peripheral Block    Patient location during procedure: holding area  Start time: 10/7/2024 8:55 AM  Reason for block: at surgeon's request and post-op pain management  Staffing  Performed by: Domo Yu MD  Authorized by: Domo Yu MD    Preanesthetic Checklist  Completed: patient identified, IV checked, site marked, risks and benefits discussed, surgical consent, monitors and equipment checked, pre-op evaluation and timeout performed  Peripheral Block  Patient position: supine  Prep: ChloraPrep  Patient monitoring: frequent blood pressure checks, continuous pulse oximetry and heart rate  Block type: Adductor Canal  Laterality: left  Injection technique: single-shot  Procedures: ultrasound guided, Ultrasound guidance required for the procedure to increase accuracy and safety of medication placement and decrease risk of complications.  Ultrasound permanent image saved    Needle  Needle type: Stimuplex   Needle gauge: 20 G  Needle length: 4 in  Needle localization: anatomical landmarks and ultrasound guidance  Assessment  Injection assessment: incremental injection, frequent aspiration, injected with ease, negative aspiration, negative for heart rate change, no paresthesia on injection, no symptoms of intraneural/intravenous injection and needle tip visualized at all times  Paresthesia pain: none  Post-procedure:  site cleaned  patient tolerated the procedure well with no immediate complications

## 2024-10-07 NOTE — ANESTHESIA PROCEDURE NOTES
Spinal Block    Patient location during procedure: OR  Start time: 10/7/2024 10:18 AM  Reason for block: procedure for pain and at surgeon's request  Staffing  Performed by: Carrie Milner CRNA  Authorized by: Domo Yu MD    Preanesthetic Checklist  Completed: patient identified, IV checked, site marked, risks and benefits discussed, surgical consent, monitors and equipment checked, pre-op evaluation and timeout performed  Spinal Block  Patient position: sitting  Prep: ChloraPrep and site prepped and draped  Patient monitoring: frequent blood pressure checks, continuous pulse ox and heart rate  Approach: midline  Needle  Needle type: Pencan   Needle gauge: 24 G  Needle length: 4 in  Assessment  Sensory level: T4  Events: failed spinal  Injection Assessment:  negative aspiration for heme, no paresthesia on injection and positive aspiration for clear CSF.  Post-procedure:  site cleaned  Additional Notes  Difficult positioning, lumbar arthritis;  Crna attempt x 2, physician x 1

## 2024-10-07 NOTE — INTERVAL H&P NOTE
H&P reviewed. After examining the patient I find no changes in the patients condition since the H&P had been written.    Vitals:    10/07/24 0831   BP: 115/61   Pulse: 80   Resp: 20   Temp: 98.4 °F (36.9 °C)   SpO2: 96%   Patient seen and examined  General: No apparent distress  CV: S1-S2  Abdomen: Soft  Chest: No audible wheezing  Left lower extremity: No abrasions open wounds; mild effusion; neurologically and vascularly intact distally  To the operating room for left total knee arthroplasty  Pros and cons of operative and nonoperative intervention discussed with patient  We will follow-up postoperatively

## 2024-10-07 NOTE — OP NOTE
Op Note  Noris Heck  MRN: [id number]      Unit/Bed#: PACU 3    PreOp Dx: left knee DJD      Postop Dx: left knee DJD      Procedure: left total knee arthroplasty      Surgeon: Miracle Yepez MD      Assistants:Saturnino Quick PA-C      No Qualified Resident Available for this Case. The physician assistant was needed for all portions of this case including prepping and draping the patient as well as prepping and final implantation of the femoral, tibial, and patellar components.  Physician assistant was also utilized for closure of the operative site.    Fluids:       EBL:       Drains: none      Specimens: No       Complications: No       Condition: stable transferred to postanesthesia care unit      Implants: Depuy Attune RP  Femoral, size: 7 patient retaining  Tibial tray: 8 fixed-bearing  Polyethylene: 10 medial stabilizing  Patellar button: 38      69 y.o.female, presents at this time, secondary to treatment of left knee DJD with varus deformity and limited flexion, which has failed conservative treatment.    The patient was told of all the pros and cons of operative and nonoperative intervention. Some of the complications of operative intervention include infection, bleeding, scarring, nerve injury, vascular injury, fracture, continued pain, decreased range of motion, DVT, PE death, loss of limb, need for further surgery, not obtain an results. The patient wished. Patient did consent for operative intervention for this pathology.    Patient was brought to the operating room. Patient was anesthetized as anesthesia team. Patient was prepped and draped in normal sterile fashion. After this was done, we did conduct a time out to make sure correct. Patient was in the room, prepped marked and draped. Implants were in the room, Rep. For the implants were present, DVT prophylaxis and antibiotics were addressed.    Midline incision was made over the anterior knee after going through skin, fatty tissue,  fascia was identified. Flaps were created both medially and laterally. Medial parapatellar incision was made. Excision of Hoffa fat pad was conducted. At this time because this was a varus knee, we did expose the medial and lateral tibial plateaus without any ligamentous releasing.  We did remove some large anterior lateral osteophytes. We're able to excise the fatty tissue from the anterior aspect of the distal femur. We were able to at this time, flex the knee with the patella everted. Intramedullary reamer was used. Intramedullary guide for the femur was then placed to determine how much of the distal femur to cut and also, valgus cut angle. Pins were then placed. Intramedullary guide was removed. Distal femoral cut was made.  Attention was now to the proximal tibia. Extramedullary guide was used. After making sure that we took the appropriate amount from the medial and lateral side with the aid of a measuring device, the jig was held in place with pins. Protection of the medial and lateral ligaments, as well as the popliteal region, was done. Proximal tibial cut was made. Extension gaps were evaluated.  Size of the femur was then determined with the aid of a guide. After this was done, we placed the appropriate sized block. These were held down with pins. Anterior and posterior cuts were made. Anterior, posterior, chamfer cuts were made. We did check our flexion gap and was noted to be appropriate.  This was a medial stabilized construct therefore a small notch was made over the trochlear region of the femur.  Tibial tray size was determined. This was held down with 2 small screws. The reamer and the punch was then used with the appropriate guide to make sure that these were all done, the appropriate manner. Guide was removed. Trial femoral component was placed. Trial tibial polyethylene was placed. Patient was noted to be stable. On coronal and sagittal planes.   Patellar button size was determined after the  articular surface of the patella was excised. The patella button was placed on the medial aspect of the patella. Holes were drilled for our true patella button to be cemented on. We tracked the knee, and we noted that the patella was tracking appropriately over the trochlear of the trial implants. After this was done we did drill holes in our trial femoral component. We then removed. All trial components.  Copious irrigation was used at the operative site. We did place some bone within the intramedullary canal of the femur. High viscocity cement was used and all components were placed, including the femur, tibia, and patella button. A trial tibial Polyethylene was placed. After cement had dried, removed the trial polyethylene and removed any excess cement that was in the popliteal region. Copious irrigation was used. The tibial polyethylene was then placed. Patient was placed in the appropriate ranges of motion and patient's Knee was noted to be stable.  Tourniquet was discontinued. Hemostasis was obtained. #1 Vicryl sutures were used to reapproximate the parapatellar incision. 2-0 Vicryl sutures for the subcutaneous closure. This was reinforced with staples. Wounds were cleaned and dried. Acticoat, 4 x 4, ABDs and web roll was placed prior to Ace bandage be placed from the foot. All the way to the mid thigh region.  Patient was awakened as anesthesia team noted to be a stable condition and transferred to postanesthesia care unit

## 2024-10-07 NOTE — ANESTHESIA POSTPROCEDURE EVALUATION
Post-Op Assessment Note    CV Status:  Stable  Pain Score: 0    Pain management: adequate       Mental Status:  Alert and awake   Hydration Status:  Euvolemic   PONV Controlled:  Controlled   Airway Patency:  Patent     Post Op Vitals Reviewed: Yes    No anethesia notable event occurred.    Staff: CRNA               BP   128/61   Temp      Pulse  71   Resp   15   SpO2   99

## 2024-10-07 NOTE — PLAN OF CARE
Problem: PHYSICAL THERAPY ADULT  Goal: Performs mobility at highest level of function for planned discharge setting.  See evaluation for individualized goals.  Description: Treatment/Interventions: Functional transfer training, Elevations, LE strengthening/ROM, Therapeutic exercise, Endurance training, Patient/family training, Equipment eval/education, Bed mobility, Gait training, Cognitive reorientation, Spoke to nursing, OT  Equipment Recommended: Walker       See flowsheet documentation for full assessment, interventions and recommendations.  Note: Prognosis: Fair  Problem List: Decreased strength, Decreased endurance, Decreased range of motion, Impaired balance, Decreased mobility, Obesity, Decreased skin integrity, Orthopedic restrictions, Pain, Decreased coordination (Gait deviations , lightheadedness)  Assessment: Pt is a 69 y.o. female seen for PT evaluation s/p admit to [facility] on 10/7/2024 w/ S/P total knee arthroplasty, left.  Pt seen in [facility] POD#0. Order placed for PT.      Prior to admission: Pt Lived w/ spouse and ranch home with 2 stairs to enter w.railing.  Patient did not require any physical assistance prior to admission and intermittently use to single-point cane.  Upon evaluation: Pt needed assist of 1 for bed mobility, min assist of 2 for sit to stand transfers and steppage ambulation towards the head of the bed.      Pt's clinical presentation is currently unstable/unpredictable given the functional mobility deficits above, especially (but not limited to) decreased ROM, pain, and decreased functional mobility tolerance, and combined with medical complications including fear/retreat and subjective reports of lightheadedness without substantially low SPB .  Pt IS NOT at her mobility baseline.  She is at risk for falls based on impaired balance, obesity, and lightheadedness .       During this admission, pt would benefit from continued skilled inpatient PT in the acute care setting in  order to address deficits as defined above to maximize function and mobility.      Recommendations:     From a PT standpoint, recommend next several sessions focus on continued mobility trials with rolling walker for ambulation, Ther Ex to tolerance, stair performance, confirmation of HEP.  Barriers to Discharge: Inaccessible home environment     Rehab Resource Intensity Level, PT: (S) II (Moderate Resource Intensity) (Pending medical optimization with anticipation that patient will progress to level 3 pending mobility progression including  stairs)    See flowsheet documentation for full assessment.

## 2024-10-08 LAB
ANION GAP SERPL CALCULATED.3IONS-SCNC: 8 MMOL/L (ref 4–13)
BUN SERPL-MCNC: 13 MG/DL (ref 5–25)
CALCIUM SERPL-MCNC: 9.7 MG/DL (ref 8.4–10.2)
CHLORIDE SERPL-SCNC: 102 MMOL/L (ref 96–108)
CO2 SERPL-SCNC: 25 MMOL/L (ref 21–32)
CREAT SERPL-MCNC: 0.7 MG/DL (ref 0.6–1.3)
ERYTHROCYTE [DISTWIDTH] IN BLOOD BY AUTOMATED COUNT: 14.2 % (ref 11.6–15.1)
GFR SERPL CREATININE-BSD FRML MDRD: 87 ML/MIN/1.73SQ M
GLUCOSE SERPL-MCNC: 124 MG/DL (ref 65–140)
GLUCOSE SERPL-MCNC: 124 MG/DL (ref 65–140)
GLUCOSE SERPL-MCNC: 131 MG/DL (ref 65–140)
GLUCOSE SERPL-MCNC: 136 MG/DL (ref 65–140)
GLUCOSE SERPL-MCNC: 161 MG/DL (ref 65–140)
GLUCOSE SERPL-MCNC: 182 MG/DL (ref 65–140)
HCT VFR BLD AUTO: 33.7 % (ref 34.8–46.1)
HGB BLD-MCNC: 10 G/DL (ref 11.5–15.4)
MCH RBC QN AUTO: 29.2 PG (ref 26.8–34.3)
MCHC RBC AUTO-ENTMCNC: 29.7 G/DL (ref 31.4–37.4)
MCV RBC AUTO: 99 FL (ref 82–98)
PLATELET # BLD AUTO: 248 THOUSANDS/UL (ref 149–390)
PMV BLD AUTO: 10.3 FL (ref 8.9–12.7)
POTASSIUM SERPL-SCNC: 3.9 MMOL/L (ref 3.5–5.3)
RBC # BLD AUTO: 3.42 MILLION/UL (ref 3.81–5.12)
SODIUM SERPL-SCNC: 135 MMOL/L (ref 135–147)
WBC # BLD AUTO: 12.95 THOUSAND/UL (ref 4.31–10.16)

## 2024-10-08 PROCEDURE — 99222 1ST HOSP IP/OBS MODERATE 55: CPT

## 2024-10-08 PROCEDURE — 82948 REAGENT STRIP/BLOOD GLUCOSE: CPT

## 2024-10-08 PROCEDURE — 99024 POSTOP FOLLOW-UP VISIT: CPT

## 2024-10-08 PROCEDURE — 85027 COMPLETE CBC AUTOMATED: CPT | Performed by: PHYSICIAN ASSISTANT

## 2024-10-08 PROCEDURE — 97535 SELF CARE MNGMENT TRAINING: CPT

## 2024-10-08 PROCEDURE — 80048 BASIC METABOLIC PNL TOTAL CA: CPT | Performed by: PHYSICIAN ASSISTANT

## 2024-10-08 PROCEDURE — 99232 SBSQ HOSP IP/OBS MODERATE 35: CPT | Performed by: HOSPITALIST

## 2024-10-08 PROCEDURE — 97116 GAIT TRAINING THERAPY: CPT

## 2024-10-08 PROCEDURE — 97110 THERAPEUTIC EXERCISES: CPT

## 2024-10-08 RX ORDER — GABAPENTIN 100 MG/1
100 CAPSULE ORAL
Status: DISCONTINUED | OUTPATIENT
Start: 2024-10-08 | End: 2024-10-10 | Stop reason: HOSPADM

## 2024-10-08 RX ORDER — ACETAMINOPHEN 325 MG/1
975 TABLET ORAL EVERY 8 HOURS SCHEDULED
Status: DISCONTINUED | OUTPATIENT
Start: 2024-10-08 | End: 2024-10-10 | Stop reason: HOSPADM

## 2024-10-08 RX ORDER — METHOCARBAMOL 750 MG/1
750 TABLET, FILM COATED ORAL EVERY 6 HOURS SCHEDULED
Status: DISCONTINUED | OUTPATIENT
Start: 2024-10-08 | End: 2024-10-10 | Stop reason: HOSPADM

## 2024-10-08 RX ORDER — DIAZEPAM 5 MG
5 TABLET ORAL ONCE
Status: COMPLETED | OUTPATIENT
Start: 2024-10-08 | End: 2024-10-08

## 2024-10-08 RX ORDER — CYCLOBENZAPRINE HCL 10 MG
10 TABLET ORAL 3 TIMES DAILY PRN
Status: DISCONTINUED | OUTPATIENT
Start: 2024-10-08 | End: 2024-10-08

## 2024-10-08 RX ADMIN — HYDROMORPHONE HYDROCHLORIDE 0.5 MG: 1 INJECTION, SOLUTION INTRAMUSCULAR; INTRAVENOUS; SUBCUTANEOUS at 14:31

## 2024-10-08 RX ADMIN — OXYCODONE HYDROCHLORIDE 10 MG: 5 TABLET ORAL at 20:03

## 2024-10-08 RX ADMIN — DOCUSATE SODIUM 100 MG: 100 CAPSULE, LIQUID FILLED ORAL at 17:35

## 2024-10-08 RX ADMIN — OXYCODONE HYDROCHLORIDE 10 MG: 5 TABLET ORAL at 04:10

## 2024-10-08 RX ADMIN — ENOXAPARIN SODIUM 40 MG: 40 INJECTION SUBCUTANEOUS at 02:17

## 2024-10-08 RX ADMIN — ACETAMINOPHEN 975 MG: 325 TABLET ORAL at 21:11

## 2024-10-08 RX ADMIN — ACETAMINOPHEN 975 MG: 325 TABLET ORAL at 14:31

## 2024-10-08 RX ADMIN — SENNOSIDES 8.6 MG: 8.6 TABLET, FILM COATED ORAL at 08:22

## 2024-10-08 RX ADMIN — CEFAZOLIN SODIUM 1000 MG: 1 SOLUTION INTRAVENOUS at 04:10

## 2024-10-08 RX ADMIN — SODIUM CHLORIDE, SODIUM LACTATE, POTASSIUM CHLORIDE, AND CALCIUM CHLORIDE 75 ML/HR: .6; .31; .03; .02 INJECTION, SOLUTION INTRAVENOUS at 11:14

## 2024-10-08 RX ADMIN — OXYCODONE HYDROCHLORIDE 10 MG: 5 TABLET ORAL at 12:45

## 2024-10-08 RX ADMIN — DIAZEPAM 5 MG: 5 TABLET ORAL at 14:31

## 2024-10-08 RX ADMIN — OXYCODONE HYDROCHLORIDE AND ACETAMINOPHEN 500 MG: 500 TABLET ORAL at 08:22

## 2024-10-08 RX ADMIN — HYDROMORPHONE HYDROCHLORIDE 0.5 MG: 1 INJECTION, SOLUTION INTRAMUSCULAR; INTRAVENOUS; SUBCUTANEOUS at 00:14

## 2024-10-08 RX ADMIN — METHOCARBAMOL TABLETS 750 MG: 750 TABLET, COATED ORAL at 17:35

## 2024-10-08 RX ADMIN — METHOCARBAMOL TABLETS 750 MG: 750 TABLET, COATED ORAL at 23:12

## 2024-10-08 RX ADMIN — PRAVASTATIN SODIUM 80 MG: 80 TABLET ORAL at 17:35

## 2024-10-08 RX ADMIN — METHOCARBAMOL TABLETS 750 MG: 750 TABLET, COATED ORAL at 11:10

## 2024-10-08 RX ADMIN — INSULIN LISPRO 1 UNITS: 100 INJECTION, SOLUTION INTRAVENOUS; SUBCUTANEOUS at 11:10

## 2024-10-08 RX ADMIN — FOLIC ACID 1 MG: 1 TABLET ORAL at 08:22

## 2024-10-08 RX ADMIN — OXYCODONE HYDROCHLORIDE 10 MG: 5 TABLET ORAL at 08:22

## 2024-10-08 RX ADMIN — DOCUSATE SODIUM 100 MG: 100 CAPSULE, LIQUID FILLED ORAL at 08:23

## 2024-10-08 RX ADMIN — OXYCODONE HYDROCHLORIDE AND ACETAMINOPHEN 500 MG: 500 TABLET ORAL at 17:35

## 2024-10-08 RX ADMIN — INSULIN LISPRO 1 UNITS: 100 INJECTION, SOLUTION INTRAVENOUS; SUBCUTANEOUS at 17:35

## 2024-10-08 RX ADMIN — GABAPENTIN 100 MG: 100 CAPSULE ORAL at 21:11

## 2024-10-08 NOTE — CONSULTS
Consultation - Acute Pain   Name: Noris Heck 69 y.o. female I MRN: [id number]  Unit/Bed#: S -01 I Date of Admission: 10/7/2024   Date of Service: 10/8/2024 I Hospital Day: 0   Inpatient consult to Acute Pain Service  Consult performed by: OMID Suarez  Consult ordered by: Domo Yu MD        Physician Requesting Evaluation: Miracle Yepez MD   Reason for Evaluation / Principal Problem: Postoperative block follow-up    Assessment & Plan  S/P total knee arthroplasty, left  Patient with history of left knee DJD who failed conservative treatment  Underwent elective left total knee arthroplasty on 10/7/2024 with orthopedic surgery  Received left-sided single shot adductor canal block with Exparel for postoperative analgesia  Denies any residual sensory/motor deficits secondary to block.    Multimodal analgesia:  Change as needed Tylenol to 975 mg every 8 hours scheduled  Start Robaxin 750 mg every 6 hours scheduled for muscle spasm/tightness  Start gabapentin 100 mg daily at bedtime  Will hold off on treatment with NSAIDs given prior reaction to naproxen  Oxycodone 5 mg every 4 hours as needed for moderate pain  Oxycodone 10 mg every 4 hours as needed for severe pain  If oxycodone continues to be effective despite the above changes may need to consider opioid rotation  IV Dilaudid 0.5 mg every 2 hours for breakthrough pain x 48 hours  Add Narcan as needed for respiratory depression/opioid reversal  Continue bowel regimen to prevent opioid-induced constipation    Please contact the [employer] role for the Acute Pain service with any questions/concerns.    Treatment plan and recommendations discussed with the primary care service.  APS will continue to follow. Please contact Acute Pain Service - via [employer] from [phone] with additional questions or concerns. See [employer] or [employer] for additional contacts and after hours information.     History of Present Illness    HPI:  Noris Heck is a 69 y.o. year old female with past medical history significant for arthritis, DM 2, HLD, HTN, obesity who presented and underwent an elective left total knee arthroplasty with orthopedic surgery on 10/7/2024.  Patient has history of DJD and failed conservative treatment measures.  Patient received left-sided single shot adductor canal block with Exparel for postoperative analgesia.  APS was consulted for postoperative block follow-up and recommendations and management of postoperative pain.    Patient was seen eating up in the chair at bedside following physical therapy session.  Reports significant pain of the left knee since surgery yesterday.  She reports oxycodone has been mildly effective in reduction of pain has received 1 dose of IV Dilaudid postoperatively.  Denies any significant tenderness/pain along the incision with palpation and reports most of her pain is of the posterior aspect of the left knee.  Patient did have an episode of dizziness and lightheadedness while working with physical therapy.  Has been tolerating the current regimen without opioid-induced side effects.    Current pain location(s): Pain Score: 8  Pain Location/Orientation: Orientation: Left (posterior medial knee)  Pain Scale: Pain Assessment Tool: 0-10      Pain History: Neck pain of left knee.  Pain Management Physician: Follow with a pain management provider.  I have reviewed the patient's controlled substance dispensing history in the Prescription Drug Monitoring Program in compliance with the [place] regulations before prescribing any controlled substances.     Review of Systems   Constitutional:  Positive for activity change.   Respiratory:  Negative for chest tightness and shortness of breath.    Cardiovascular:  Negative for chest pain.   Gastrointestinal:  Negative for nausea and vomiting.   Musculoskeletal:  Positive for arthralgias and myalgias. Negative for back pain and neck pain.   Neurological:  Negative  for dizziness, weakness, light-headedness, numbness and headaches.   All other systems reviewed and are negative.    I have reviewed the patient's PMH, PSH, Social History, Family History, Meds, and Allergies    Objective :  Temp:  [97.5 °F (36.4 °C)-98.1 °F (36.7 °C)] 97.5 °F (36.4 °C)  HR:  [] 90  BP: (118-149)/(56-84) 127/61  Resp:  [10-21] 16  SpO2:  [91 %-99 %] 94 %  O2 Device: None (Room air)  Nasal Cannula O2 Flow Rate (L/min):  [2 L/min] 2 L/min    Physical Exam  Vitals reviewed.   Constitutional:       General: She is not in acute distress.  HENT:      Head: Normocephalic and atraumatic.   Cardiovascular:      Rate and Rhythm: Normal rate.   Pulmonary:      Effort: Pulmonary effort is normal.   Chest:      Chest wall: No tenderness.   Abdominal:      General: There is no distension.      Tenderness: There is no abdominal tenderness.   Musculoskeletal:         General: Swelling (L knee) and tenderness (L knee) present.      Cervical back: Normal range of motion.   Skin:     General: Skin is warm and dry.   Neurological:      Mental Status: She is alert and oriented to person, place, and time. Mental status is at baseline.   Psychiatric:         Mood and Affect: Mood normal.         Behavior: Behavior normal.          Lab Results: I have reviewed the following results:  Estimated Creatinine Clearance: 86.1 mL/min (by C-G formula based on SCr of 0.7 mg/dL).  Lab Results   Component Value Date    WBC 12.95 (H) 10/08/2024    HGB 10.0 (L) 10/08/2024    HCT 33.7 (L) 10/08/2024     10/08/2024         Component Value Date/Time    K 3.9 10/08/2024 0602    K 4.6 09/12/2023 1031     10/08/2024 0602     09/12/2023 1031    CO2 25 10/08/2024 0602    CO2 27 09/12/2023 1031    BUN 13 10/08/2024 0602    BUN 18 09/12/2023 1031    CREATININE 0.70 10/08/2024 0602    CREATININE 0.56 09/12/2023 1031         Component Value Date/Time    CALCIUM 9.7 10/08/2024 0602    CALCIUM 11.1 (H) 05/29/2024 0832     ALKPHOS 51 09/12/2024 1105    ALKPHOS 57 09/12/2023 1031    AST 15 09/12/2024 1105    AST 16 09/12/2023 1031    ALT 12 09/12/2024 1105    ALT 14 09/12/2023 1031    TP 7.7 09/12/2024 1105    TP 7.6 09/12/2023 1031    ALB 4.4 09/12/2024 1105    ALB 4.3 09/12/2023 1031       Imaging Results Review: No pertinent imaging studies reviewed.  Other Study Results Review: No additional pertinent studies reviewed.

## 2024-10-08 NOTE — ASSESSMENT & PLAN NOTE
Lab Results   Component Value Date    HGBA1C 6.2 (H) 09/12/2024   Home regimen of Metformin and Ozempic, will hold while inpatient.  Accu checks and SSI.  Hypoglycemia protocol.

## 2024-10-08 NOTE — ASSESSMENT & PLAN NOTE
69 yoF s/p left total knee arthroplasty with Dr. Yepez on 10/07/2024.   Weight bearing as tolerated to the left lower extremity   Patient noted to have significant posterior knee pain while working with PT today   Acute pain service consulted. Robaxin added.   PT/OT   Recommending rehab at this time  Further recommendations pending re-evaluation when pain better controlled.   Lovenox while admitted   ASA 81 mg BID x 35 days on discharge   Appreciate recommendations for medical management per IM   Follow up with Dr. Yepez in 2 weeks

## 2024-10-08 NOTE — PHYSICAL THERAPY NOTE
"PHYSICAL THERAPY TREATMENT NOTE  NAME:  Noris Heck  DATE: 10/08/24    Length Of Stay: 0  Performed at least 2 patient identifiers during session: Name and Epic photo    TREATMENT:    10/08/24 1015   PT Last Visit   PT Visit Date 10/08/24   Note Type   Note Type Treatment   Pain Assessment   Pain Assessment Tool 0-10   Pain Score 8  (9 w/ activity)   Pain Location/Orientation Orientation: Left  (posterior medial knee)   Pain Onset/Description Frequency: Constant/Continuous  (deep)   Effect of Pain on Daily Activities limits knee ext>flexion, pain w/ LE hanging. TTP   Patient's Stated Pain Goal No pain   Hospital Pain Intervention(s) Cold applied;Ambulation/increased activity;Emotional support;Repositioned  (BLE plated on step stool)   Restrictions/Precautions   Weight Bearing Precautions Per Order Yes   LLE Weight Bearing Per Order WBAT  (s/p elective L TKA 10/7 w/ Dr. Yepez)   Other Precautions Chair Alarm;Bed Alarm;WBS;Multiple lines;Fall Risk;Pain   General   Chart Reviewed Yes   Response to Previous Treatment Patient reporting fatigue but able to participate.   Family/Caregiver Present No   Cognition   Arousal/Participation Responsive  (mildly lethargic)   Attention Within functional limits   Orientation Level Oriented X4   Memory Within functional limits   Following Commands Follows multistep commands with increased time or repetition   Subjective   Subjective On entering room, patient upright in recliner and reporting having substantial pain at the proximal posterior left knee.  Reports that her pain medication \"is not touching her pain\" is concerned as to how she will get to patient PT.   Transfers   Sit to Stand 4  Minimal assistance   Additional items Assist x 1;Increased time required;Verbal cues;Armrests  (instruction for hand placement)   Stand to Sit 5  Supervision   Additional items Assist x 1;Increased time required;Armrests;Verbal cues   Ambulation/Elevation   Gait pattern Antalgic;Decreased " "L stance;Inconsistent dru;Excessively slow;Step to  (Walks too far inside of walker initially, able to self-correct by end of trial.  Patient performed flexed knee gait pattern initially, attempting to perform more of a heel strike by end of session.)   Gait Assistance 4  Minimal assist   Additional items Assist x 1;Verbal cues;Tactile cues   Assistive Device Rolling walker   Distance 2' with trial ending due to patient's reports of lightheadedness 8 out of 10, needing to sit.  Difficulty obtaining a BP immediately post ambulation.  Symptoms resolved after about 5 minutes sitting tolerance.   Stair Management Assistance Not tested  (Due to pain and reports of lightheadedness.)   Ambulation/Elevation Additional Comments Additional time pre-gait to promote patient to flex and extend left knee during standing tolerance for about 1 minute prior to walking   Balance   Static Sitting Good   Static Standing Poor +   Ambulatory Poor +   Endurance Deficit   Endurance Deficit Yes   Endurance Deficit Description Limited standing tolerance time, reports of lightheadedness especially at end of gait trial, limited ambulation distance.   Activity Tolerance   Activity Tolerance Patient limited by fatigue;Patient limited by pain   Medical Staff Made Aware Extensive time for additional care coordination Ortho Paulo PAZ from APS, nursing, PCA, for pain control and attending to patient with her reports of lightheadedness.   Exercises   Knee AROM Long Arc Quad Sitting;10 reps;AAROM;Left   Neuro re-ed \"Posterior knee pain\" proximal to left knee on medial> lateral side, tender to palpation. Pain increases w/ LAQ; No substantial change w/ WB nor ankle DF. Pain improves w/ ice, support on stool in sitting, slight TEMPORARY knee flexion   Assessment   Prognosis Fair   Problem List Decreased strength;Decreased range of motion;Impaired balance;Decreased endurance;Decreased mobility;Obesity;Decreased skin integrity;Pain;Orthopedic " restrictions  (gait deviations)   Assessment Although Noris did make mobility progress since initial evaluation, she continues to require treatment and min assist for performing transfers, as well as with ambulation.  Requires verbal instruction for proper techniques especially with walker management> sequencing with walker.  Atempted to perform stairs, however patient with symptomatic lightheadedness during mobility.  Skilled PT recommended to progress patient towards treatment goals.   Barriers to Discharge Decreased caregiver support;Inaccessible home environment   Goals   Patient Goals to have less pain and go home   STG Expiration Date 10/17/24   PT Treatment Day 2   Plan   Treatment/Interventions Functional transfer training;LE strengthening/ROM;Endurance training;Therapeutic exercise;Cognitive reorientation;Patient/family training;Equipment eval/education;Bed mobility;Gait training;Spoke to nursing;Spoke to case management;OT;Family;Spoke to advanced practitioner   Progress Slow progress, decreased activity tolerance   PT Frequency Twice a day   Discharge Recommendation   Rehab Resource Intensity Level, PT III (Minimum Resource Intensity)  (With improvements in mobility, performance and stairs, as well as pain control.)   Equipment Recommended Walker   Walker Package Recommended Wheeled walker   AM-PAC Basic Mobility Inpatient   Turning in Flat Bed Without Bedrails 3   Lying on Back to Sitting on Edge of Flat Bed Without Bedrails 3   Moving Bed to Chair 3   Standing Up From Chair Using Arms 3   Walk in Room 3   Climb 3-5 Stairs With Railing 1   Basic Mobility Inpatient Raw Score 16   Basic Mobility Standardized Score 38.32   [facility] Highest Level Of Mobility   -HLM Goal 5: Stand one or more mins   -HL Achieved 5: Stand (1 or more minutes)   Education   Education Provided Mobility training;Home exercise program;Assistive device   Patient Reinforcement needed;Explanation/teachback used   End of  Consult   Patient Position at End of Consult All needs within reach;Bed/Chair alarm activated;Bedside chair     The patient's -[facility] Basic Mobility Inpatient Short Form Raw Score is 16. A Raw score of less than or equal to 16 suggests the patient may benefit from discharge to post-acute rehabilitation services, which DOES NOT coincide with EVENTUAL above PT recommendations.     However please refer to therapist recommendation for discharge planning given other factors that may influence destination.     Adapted from Minh SAEZ, Saul J, Yuliana J, Teresa J. Association of -[facility] “6-Clicks” Basic Mobility and Daily Activity Scores With Discharge Destination. Physical Therapy, 2021;101:1-9. DOI: [email or web address]    Roma Monteiro PT, DPT

## 2024-10-08 NOTE — UTILIZATION REVIEW
Initial Clinical Review  Elective outpatient procedure, converted to inpatient admission due to pain L knee . APS consult placed with med changes today for pain control.     Elective Outpt surgical procedure  Age/Sex: 69 y.o. female  Surgery Date: 10/7/24 @ 1042  Procedure:  left total knee arthroplasty   Anesthesia: spinal block, peripheral block- adductor canal .   Operative Findings: did remove some large anterior lateral osteophytes. We're able to excise the fatty tissue from the anterior aspect of the distal femur. Did place some bone within the intramedullary canal of the femur. High viscocity cement was used and all components were placed, including the femur, tibia, and patella button. A trial tibial Polyethylene was placed. After cement had dried, removed the trial polyethylene and removed any excess cement that was in the popliteal region. Copious irrigation was used. The tibial polyethylene was then placed. Patient was placed in the appropriate ranges of motion and patient's Knee was noted to be stable.      POD #0- Internal med consult- presented  for total left knee arthroplasty secondary to failed conservative treatment of left knee DJD with varus deformity and limited flexion. Patient was admitted for continued pain control post operatively. APS consult . Blood pressure is reviewed and acceptable. Restart home medications of Amlodipine, ARB and HCTZ when appropriate given surgical procedure. Monitor BP.  Accucheks, SSI while IP . Hold home Metformin and Ozempic for DM2. LLE dressing dry and intact     POD#1 Progress Note: 10/8/24 POD #1 Converted to IP    APS consult- Received left-sided single shot adductor canal block with Exparel for postoperative analgesia.  Denies any residual sensory/motor deficits secondary to block. Reports significant pain of the left knee since surgery yesterday. Pain 8/10 . She reports oxycodone has been mildly effective in reduction of pain  and has received  IV Dilaudid  postoperatively. Denies any significant tenderness/pain along the incision with palpation and reports most of her pain is of the posterior aspect of the left knee. Patient did have an episode of dizziness and lightheadedness while working with PT.  Plan-  Change prn Tylenol to scheduled.  Start Robaxin 750 mg every 6 hours scheduled for muscle spasm/tightness . Start gabapentin 100 mg daily at bedtime . Maintain prn Oxycodone, prn IV Dilaudid. Bowel regime .   Ortho - Patient working with PT  and upon evaluation found to have significant posterior knee pain. Denies fever, chills.  Eating and drinking OK.   LLE drsg C/D/I . TTP over posterior aspect of knee at insertion of hamstrings tendon  . Thigh and calf musculature soft and compressible . Full sensation, motor intact. 2 + DP pulses. WBAT LLE. PT/OT to make further recommendations regarding rehab once pain better controlled.    Medicine- Holding off on home medications of Amlodipine, ARB and HCTZ      Date: 10/9 POD #2   Day 3: Has surpassed a 2nd midnight with active treatments and services.   Pt states her pain is better controlled than it was yesterday since adjustments were made to her pain regimen. Pain 7/10 today. She was able to ambulate with therapy and did better today. She is passing gas but no bowel movement.  Left knee with Ace wrap in place, able to wiggle toes. Ordered dulcolax suppos today .    PT- AM-PAC Basic Mobility Inpatient Short Form Raw Score is 18. A Raw score of greater than 16 suggests the patient may benefit from discharge to home .  progress was noted with skilled PT intervention as pt initially required min ax1 for STS from recliner to RW but progress to /s for all other functional transfers to and from RW, w/o LOB and good recall on hand placement for increased safety and balance. pt was able to increase ambulation distance to 30'x1 RW 15'x2 RW with close /s.    Level III (Minimum) Resource Intensity              Admission Orders:  Date/Time/Statement:   Admission Orders (From admission, onward)       Ordered        10/08/24 1412  INPATIENT ADMISSION  Once                     Outpatient No Charge Bed  (Outpatient No Charge Bed/Extended Recovery)  Once        Transfer Service: Orthopedic Surgery    10/07/24 1208             Orders Placed This Encounter   Procedures    INPATIENT ADMISSION     Standing Status:   Standing     Number of Occurrences:   1     Order Specific Question:   Level of Care     Answer:   Med Surg [16]     Order Specific Question:   Estimated length of stay     Answer:   More than 2 Midnights     Order Specific Question:   Certification     Answer:   I certify that inpatient services are medically necessary for this patient for a duration of greater than two midnights. See H&P and MD Progress Notes for additional information about the patient's course of treatment.     Diet: level 2 carb diet  Mobility: WBAT BLE,  Up to bedside chair at least three times a day as tolerated. 2)  Ambulate in room progressing to hallway with assistive device four times daily (including PT) when PT advises.   3)  Bathroom privileges with assistance.   DVT Prophylaxis: Ambulation, Lovenox, SCD    Medications/Pain Control:   Scheduled Medications:  acetaminophen, 975 mg, Oral, Q8H NANDO  ascorbic acid, 500 mg, Oral, BID  docusate sodium, 100 mg, Oral, BID  enoxaparin, 40 mg, Subcutaneous, Daily  folic acid, 1 mg, Oral, Daily  gabapentin, 100 mg, Oral, HS  insulin lispro, 1-5 Units, Subcutaneous, HS  insulin lispro, 1-6 Units, Subcutaneous, TID AC  methocarbamol, 750 mg, Oral, Q6H NANDO  polyethylene glycol, 17 g, Oral, Daily  pravastatin, 80 mg, Oral, Daily With Dinner  senna, 1 tablet, Oral, Daily    enoxaparin (LOVENOX) subcutaneous injection 40 mg  Dose: 40 mg  Freq: Once Route: SC  Start: 10/08/24 0200 End: 10/08/24 0217  diazepam (VALIUM) tablet 5 mg  Dose: 5 mg  Freq: Once Route: PO  Start: 10/08/24 1415 End: 10/08/24 1431  ceFAZolin (ANCEF) IVPB  (premix in dextrose) 1,000 mg 50 mL  Dose: 1,000 mg  Freq: Every 8 hours Route: IV  Last Dose: 1,000 mg (10/08/24 0410)  Start: 10/07/24 2000 End: 10/08/24 0440    Continuous IV Infusions:  lactated ringers, 75 mL/hr, Intravenous, Continuous   End: 10/08/24 2045       PRN Meds:  calcium carbonate, 1,000 mg, Oral, Daily PRN   HYDROmorphone, 0.5 mg, Intravenous, Q2H PRN x2 10/8 End: 10/09/24 0950   lactated ringers, 1,000 mL, Intravenous, Once PRN   And  lactated ringers, 1,000 mL, Intravenous, Once PRN  naloxone, 0.04 mg, Intravenous, Q1MIN PRN  ondansetron, 4 mg, Intravenous, Q6H PRN  oxyCODONE, 10 mg, Oral, Q4H PRN x3 10/7, x4 10/8 , x2 10/9   oxyCODONE, 5 mg, Oral, Q4H PRN  sodium chloride, 1,000 mL, Intravenous, Once PRN   And  sodium chloride, 1,000 mL, Intravenous, Once PRN  acetaminophen (TYLENOL) tablet 650 mg  Dose: 650 mg  Freq: Every 6 hours PRN Route: PO  PRN Reason: mild pain  Start: 10/07/24 1338 End: 10/08/24 1058  x1 10/7    bisacodyl (DULCOLAX) rectal suppository 10 mg  Dose: 10 mg  Freq: Daily PRN Route: RE  PRN Reason: constipation  Start: 10/09/24 0953 x1 10/9           Vital Signs (last 3 days)       Date/Time Temp Pulse Resp BP MAP (mmHg) SpO2 Calculated FIO2 (%) - Nasal Cannula O2 Flow Rate (L/min) Nasal Cannula O2 Flow Rate (L/min) O2 Device Patient Position - Orthostatic VS Pain    10/09/24 1002 -- -- -- -- -- -- -- -- -- -- -- 7    10/09/24 0900 -- -- -- -- -- -- -- -- -- -- -- 7    10/09/24 0846 -- -- -- -- -- -- -- -- -- -- -- 7    10/09/24 08:34:08 -- 107 -- 128/73 91 95 % -- -- -- -- -- --    10/09/24 0749 98 °F (36.7 °C) -- -- -- -- -- -- -- -- -- -- 8    10/09/24 07:08:22 -- 91 16 102/62 75 95 % -- -- -- -- -- --    10/09/24 0533 -- -- -- -- -- -- -- -- -- -- -- 7    10/09/24 0117 -- -- -- -- -- -- -- -- -- -- -- 7    10/09/24 0045 -- -- -- -- -- -- -- -- -- -- -- 2    10/08/24 22:39:22 98.2 °F (36.8 °C) 94 16 124/65 85 91 % -- -- -- -- -- --    10/08/24 2111 -- -- -- -- -- -- -- -- --  -- -- 5    10/08/24 2003 -- -- -- -- -- -- -- -- -- -- -- 8    10/08/24 19:16:46 98.4 °F (36.9 °C) 89 15 109/67 81 92 % -- -- -- -- -- --    10/08/24 15:36:28 99 °F (37.2 °C) 98 15 120/63 82 90 % -- -- -- -- -- --    10/08/24 1431 -- -- -- -- -- -- -- -- -- -- -- 10 - Worst Possible Pain    10/08/24 1047 -- -- -- 127/61 -- -- -- -- -- -- Sitting --    10/08/24 1015 -- -- -- -- -- -- -- -- -- -- -- 8    10/08/24 0843 -- -- -- -- -- -- -- -- -- -- -- 7    10/08/24 0822 -- -- -- -- -- -- -- -- -- -- -- 10 - Worst Possible Pain    10/08/24 07:10:36 97.5 °F (36.4 °C) 90 16 137/63 88 94 % -- -- -- -- -- --    10/08/24 0410 -- -- -- -- -- -- -- -- -- -- -- 8    10/08/24 0014 -- -- -- -- -- -- -- -- -- -- -- 9    10/07/24 23:53:57 97.8 °F (36.6 °C) 100 17 118/69 85 91 % -- -- -- -- -- --    10/07/24 2241 -- -- -- -- -- -- -- -- -- -- -- 10 - Worst Possible Pain    10/07/24 1758 -- -- -- -- -- -- -- -- -- -- -- 8    10/07/24 1715 -- 91 21 121/56 -- 95 % -- -- -- None (Room air) -- --    10/07/24 1600 -- 85 19 135/75 -- 99 % -- 2 L/min -- Nasal cannula -- --    10/07/24 1500 -- 87 20 119/62 -- 99 % -- 2 L/min -- Nasal cannula -- No Pain    10/07/24 1445 -- -- -- -- -- -- -- -- -- -- -- 10 - Worst Possible Pain    10/07/24 1440 -- 81 18 122/57 -- 94 % -- 2 L/min -- Nasal cannula -- No Pain    10/07/24 1425 -- 95 14 136/66 -- 95 % -- 2 L/min -- Nasal cannula Sitting --    10/07/24 1408 -- -- -- 149/65 94 -- -- -- -- -- Lying --    10/07/24 1402 -- -- -- -- -- -- -- -- -- -- -- 10 - Worst Possible Pain    10/07/24 1345 -- 86 20 133/60 -- 94 % -- -- -- None (Room air) -- 10 - Worst Possible Pain    10/07/24 1330 -- 75 18 131/84 -- 95 % -- -- -- None (Room air) -- No Pain    10/07/24 1315 -- 68 20 135/63 90 95 % 28 -- 2 L/min Nasal cannula -- --    10/07/24 1306 -- 70 12 134/60 86 96 % 28 -- 2 L/min Nasal cannula -- 8    10/07/24 1300 -- 68 20 134/60 86 95 % 28 -- 2 L/min Nasal cannula -- --    10/07/24 1258 -- 70 10 133/63 90 95  % 28 -- 2 L/min Nasal cannula -- 6    10/07/24 1247 -- -- -- -- -- -- -- -- -- -- -- 7    10/07/24 1245 -- 70 20 133/63 90 96 % 28 -- 2 L/min Nasal cannula -- --    10/07/24 1242 -- 68 20 -- -- 96 % 28 -- 2 L/min Nasal cannula -- 8    10/07/24 1230 -- 70 20 124/56 84 96 % 28 -- 2 L/min Nasal cannula -- No Pain    10/07/24 1220 -- 72 20 125/60 87 92 % -- -- -- None (Room air) -- --    10/07/24 1214 98.1 °F (36.7 °C) 74 18 128/61 88 99 % -- 6 L/min -- Simple mask -- --    10/07/24 0834 -- -- -- -- -- -- -- -- -- -- -- No Pain    10/07/24 0831 98.4 °F (36.9 °C) 80 20 115/61 -- 96 % -- -- -- None (Room air) -- --    10/07/24 0828 -- -- -- -- -- -- -- -- -- -- -- Med Not Given for Pain - for MAR use only          Weight (last 2 days)       Date/Time Weight    10/09/24 0600 101 (222.66)    10/08/24 0532 103 (226.19)    10/07/24 0834 99.8 (220)            Pertinent Labs/Diagnostic Test Results:         Results from last 7 days   Lab Units 10/09/24  0552 10/08/24  0602   WBC Thousand/uL 12.27* 12.95*   HEMOGLOBIN g/dL 10.3* 10.0*   HEMATOCRIT % 31.9* 33.7*   PLATELETS Thousands/uL 239 248         Results from last 7 days   Lab Units 10/09/24  0553 10/08/24  0602   SODIUM mmol/L 135 135   POTASSIUM mmol/L 4.3 3.9   CHLORIDE mmol/L 102 102   CO2 mmol/L 28 25   ANION GAP mmol/L 5 8   BUN mg/dL 11 13   CREATININE mg/dL 0.60 0.70   EGFR ml/min/1.73sq m 91 87   CALCIUM mg/dL 9.8 9.7         Results from last 7 days   Lab Units 10/09/24  1059 10/09/24  0706 10/08/24  2120 10/08/24  1735 10/08/24  1048 10/08/24  0708 10/08/24  0004 10/07/24  1215 10/07/24  0827   POC GLUCOSE mg/dl 141* 144* 136 182* 161* 124 131 125 105     Results from last 7 days   Lab Units 10/09/24  0553 10/08/24  0602   GLUCOSE RANDOM mg/dL 117 124           Network Utilization Review Department  ATTENTION: Please call with any questions or concerns to [phone] and carefully listen to the prompts so that you are directed to the right person. All voicemails  are confidential.   For Discharge needs, contact Care Management DC Support Team at [phone] opt. 2  Send all requests for admission clinical reviews, approved or denied determinations and any other requests to dedicated fax number below belonging to the campus where the patient is receiving treatment. List of dedicated fax numbers for the Facilities:  FACILITY NAME UR FAX NUMBER   ADMISSION DENIALS (Administrative/Medical Necessity) [phone]   DISCHARGE SUPPORT TEAM (NETWORK) [phone]   PARENT CHILD HEALTH (Maternity/NICU/Pediatrics) [phone]   [facility] [phone]   [facility] [phone]   [facility] [phone]   [facility] [phone]   [facility] [phone]   [facility] [phone]   [facility] [phone]   [facility] [phone]   [facility] [phone]   [facility] [phone]   [facility] [phone]   [facility] [phone]

## 2024-10-08 NOTE — ASSESSMENT & PLAN NOTE
Diabetes management per Internal Medicine   Lab Results   Component Value Date    HGBA1C 6.2 (H) 09/12/2024

## 2024-10-08 NOTE — ASSESSMENT & PLAN NOTE
Blood pressure is reviewed and acceptable.  Holding off on home medications of Amlodipine, ARB and HCTZ  Monitor blood pressure.

## 2024-10-08 NOTE — ASSESSMENT & PLAN NOTE
Blood pressure is reviewed and acceptable.  Last recorded pressure: 121/56  Restart home medications of Amlodipine, ARB and HCTZ when appropriate given surgical procedure.  Monitor blood pressure.

## 2024-10-08 NOTE — CONSULTS
Consultation - Hospitalist   Name: Noris Heck 69 y.o. female I MRN: [id number]  Unit/Bed#: S -01 I Date of Admission: 10/7/2024   Date of Service: 10/7/2024 I Hospital Day: 0   Inpatient consult to Internal Medicine  Consult performed by: OMID Lamb  Consult ordered by: Delfin Sanchez PA-C        Physician Requesting Evaluation: Miracle Yepez MD   Reason for Evaluation / Principal Problem: DM2, HTN management    Assessment & Plan  S/P total knee arthroplasty, left  Presentation: Patient presented to [facility] for total left knee arthroplasty secondary to failed conservative treatment of left knee DJD with varus deformity and limited flexion. Patient was admitted for continued pain control post operatively.  POD #0.  Continue IV Ancef.  Pain control per primary service.  Bowel regimen.  APS consult.  Obesity (BMI 35.0-39.9 without comorbidity)  Encouraged lifestyle modifications.  Type 2 diabetes mellitus, without long-term current use of insulin ([facility])    Lab Results   Component Value Date    HGBA1C 6.2 (H) 09/12/2024   Home regimen of Metformin and Ozempic, will hold while inpatient.  Accu checks and SSI.  Hypoglycemia protocol.  Hyperlipidemia, unspecified  Continue statin.  Primary hypertension  Blood pressure is reviewed and acceptable.  Last recorded pressure: 121/56  Restart home medications of Amlodipine, ARB and HCTZ when appropriate given surgical procedure.  Monitor blood pressure.  Hospitalist service will follow.      VTE Pharmacologic Prophylaxis: VTE Score: 3 Moderate Risk (Score 3-4) - Pharmacological DVT Prophylaxis Ordered: enoxaparin (Lovenox).  Code Status: Level 1 - Full Code prior  Discussion with family: Patient declined call to .     Anticipated Length of Stay: Patient will be admitted on an observation basis with an anticipated length of stay of less than 2 midnights secondary to pain control.    History of Present Illness   Chief Complaint: Left knee  christiano Heck is a 69 y.o. female with a PMH of arthritis, DM2, HLD, HTN and obesity who presented to [facility] for total left knee arthroplasty secondary to failed conservative treatment of left knee DJD with varus deformity and limited flexion. Patient was admitted for continued pain control post operatively.    Review of Systems   Constitutional:  Negative for chills and fever.   Respiratory:  Negative for shortness of breath.    Cardiovascular:  Negative for chest pain.   Gastrointestinal:  Negative for abdominal pain, nausea and vomiting.   Musculoskeletal:  Positive for arthralgias.   All other systems reviewed and are negative.      Historical Information   Past Medical History:   Diagnosis Date    Arthritis     Diabetes mellitus (HCC)     Hyperlipidemia     Hypertension      Past Surgical History:   Procedure Laterality Date    ANKLE FUSION Right     Patient states about 4 surgeries on ankle    CERVICAL POLYPECTOMY      COLONOSCOPY      DILATION AND CURETTAGE OF UTERUS      THYROID SURGERY      nodule removed     Social History     Tobacco Use    Smoking status: Never    Smokeless tobacco: Never   Vaping Use    Vaping status: Never Used   Substance and Sexual Activity    Alcohol use: Yes     Comment: socially    Drug use: Never    Sexual activity: Yes     Partners: Male     E-Cigarette/Vaping    E-Cigarette Use Never User      E-Cigarette/Vaping Substances    Nicotine No     THC No     CBD No     Flavoring No     Other No     Unknown No      Family History   Problem Relation Age of Onset    Liver disease Mother     Aortic stenosis Father     Breast cancer Sister     Uterine cancer Paternal Grandmother      Social History:  Marital Status: /Civil Union   Occupation: Unknown  Patient Pre-hospital Living Situation: Home  Patient Pre-hospital Level of Mobility: walks  Patient Pre-hospital Diet Restrictions: CCD    Objective :  Temp:  [98.1 °F (36.7 °C)-98.4 °F (36.9 °C)] 98.1 °F (36.7 °C)  HR:   [68-95] 91  BP: (115-149)/(56-84) 121/56  Resp:  [10-21] 21  SpO2:  [92 %-99 %] 95 %  O2 Device: None (Room air)  Nasal Cannula O2 Flow Rate (L/min):  [2 L/min] 2 L/min    Physical Exam  Vitals and nursing note reviewed.   Constitutional:       General: She is not in acute distress.     Appearance: She is obese. She is not ill-appearing.   HENT:      Head: Normocephalic.      Nose: Nose normal.      Mouth/Throat:      Pharynx: Oropharynx is clear.   Eyes:      General: No scleral icterus.     Conjunctiva/sclera: Conjunctivae normal.   Cardiovascular:      Rate and Rhythm: Normal rate and regular rhythm.      Pulses: Normal pulses.      Heart sounds: Murmur heard.   Pulmonary:      Effort: Pulmonary effort is normal. No respiratory distress.      Breath sounds: Normal breath sounds. No wheezing, rhonchi or rales.   Chest:      Chest wall: No tenderness.   Abdominal:      General: Bowel sounds are normal. There is no distension.      Palpations: Abdomen is soft.      Tenderness: There is no abdominal tenderness.   Musculoskeletal:      Cervical back: Normal range of motion.      Left knee: Decreased range of motion.      Comments: LLE dressing dry and intact   Skin:     General: Skin is warm and dry.   Neurological:      Mental Status: She is alert and oriented to person, place, and time.         Lines/Drains:            Lab Results: I have reviewed the following results:              Results from last 7 days   Lab Units 10/07/24  1215 10/07/24  0827   POC GLUCOSE mg/dl 125 105     Lab Results   Component Value Date    HGBA1C 6.2 (H) 09/12/2024    HGBA1C 6.5 (H) 02/20/2024    HGBA1C 6.7 (H) 09/12/2023           Imaging Results Review: No pertinent imaging studies reviewed.  Other Study Results Review: No additional pertinent studies reviewed.    Administrative Statements   I have spent a total time of 70 minutes in caring for this patient on the day of the visit/encounter including Instructions for management, Patient  and family education, Counseling / Coordination of care, Documenting in the medical record, Reviewing / ordering tests, medicine, procedures  , Obtaining or reviewing history  , and Communicating with other healthcare professionals .    ** Please Note: This note has been constructed using a voice recognition system. **

## 2024-10-08 NOTE — PLAN OF CARE
Problem: PAIN - ADULT  Goal: Verbalizes/displays adequate comfort level or baseline comfort level  Description: Interventions:  - Encourage patient to monitor pain and request assistance  - Assess pain using appropriate pain scale  - Administer analgesics based on type and severity of pain and evaluate response  - Implement non-pharmacological measures as appropriate and evaluate response  - Consider cultural and social influences on pain and pain management  - Notify physician/advanced practitioner if interventions unsuccessful or patient reports new pain  Outcome: Progressing     Problem: INFECTION - ADULT  Goal: Absence or prevention of progression during hospitalization  Description: INTERVENTIONS:  - Assess and monitor for signs and symptoms of infection  - Monitor lab/diagnostic results  - Monitor all insertion sites, i.e. indwelling lines, tubes, and drains  - Monitor endotracheal if appropriate and nasal secretions for changes in amount and color  - [place] appropriate cooling/warming therapies per order  - Administer medications as ordered  - Instruct and encourage patient and family to use good hand hygiene technique  - Identify and instruct in appropriate isolation precautions for identified infection/condition  Outcome: Progressing  Goal: Absence of fever/infection during neutropenic period  Description: INTERVENTIONS:  - Monitor WBC    Outcome: Progressing     Problem: DISCHARGE PLANNING  Goal: Discharge to home or other facility with appropriate resources  Description: INTERVENTIONS:  - Identify barriers to discharge w/patient and caregiver  - Arrange for needed discharge resources and transportation as appropriate  - Identify discharge learning needs (meds, wound care, etc.)  - Arrange for interpretive services to assist at discharge as needed  - Refer to Case Management Department for coordinating discharge planning if the patient needs post-hospital services based on physician/advanced  practitioner order or complex needs related to functional status, cognitive ability, or social support system  Outcome: Progressing     Problem: Knowledge Deficit  Goal: Patient/family/caregiver demonstrates understanding of disease process, treatment plan, medications, and discharge instructions  Description: Complete learning assessment and assess knowledge base.  Interventions:  - Provide teaching at level of understanding  - Provide teaching via preferred learning methods  Outcome: Progressing

## 2024-10-08 NOTE — PLAN OF CARE
Problem: OCCUPATIONAL THERAPY ADULT  Goal: Performs self-care activities at highest level of function for planned discharge setting.  See evaluation for individualized goals.  Description: Treatment Interventions: ADL retraining, Functional transfer training, Endurance training, Patient/family training, Equipment evaluation/education, Compensatory technique education, Energy conservation          See flowsheet documentation for full assessment, interventions and recommendations.   Outcome: Progressing  Note: Limitation: Decreased ADL status, Decreased Safe judgement during ADL, Decreased endurance, Decreased self-care trans, Decreased high-level ADLs (pain, balance, fxnl mobility, act john, fxnl reach, standing john, strength, and fxnl sitting john)  Prognosis: Good  Assessment: Pt seen for OT treatment on 10/8 s/p admit to [facility] w/ S/P total knee arthroplasty, left. Pt agreeable to OT treatment session, upon arrival patient was found supine in bed. Patient participated in skilled OT interventions to address ADL tasks, functional transfers, and overall activity tolerance and participation. In comparison to previous session, Noris demonstrated improvements in functional activity tolerance, pain management and performance of ADLs/mobility this session and was able to manage household distance to bathroom for toilet but is continuing to perform below baseline due to pain, limited weight bearing through LLE, decreased standing tolerance, dynamic standing balance and endurance. Personal factors that continue to impact DC include: difficulty completing ADLs and difficulty completing IADLs. Patient to benefit from continued IPOT treatment to address deficits as defined above and maximize level of functional independence with ADLs and functional mobility. Goals remain appropriate. Continue per POC.     Rehab Resource Intensity Level, OT: No post-acute rehabilitation needs (increased social support for ADLs/IADLs PRN (wife  reports spouse will assist))     MINI Fuller, OTR/L  [place] License [id number]  [place] License [id number]

## 2024-10-08 NOTE — PROGRESS NOTES
Progress Note - Orthopedics   Name: Noris Heck 69 y.o. female I MRN: [id number]  Unit/Bed#: S -01 I Date of Admission: 10/7/2024   Date of Service: 10/8/2024 I Hospital Day: 0    Assessment & Plan  S/P total knee arthroplasty, left  69 yoF s/p left total knee arthroplasty with Dr. Yepez on 10/07/2024.   Weight bearing as tolerated to the left lower extremity   Patient noted to have significant posterior knee pain while working with PT today   Acute pain service consulted. Robaxin added.   PT/OT   Recommending rehab at this time  Further recommendations pending re-evaluation when pain better controlled.   Lovenox while admitted   ASA 81 mg BID x 35 days on discharge   Appreciate recommendations for medical management per IM   Follow up with Dr. Yepez in 2 weeks     Type 2 diabetes mellitus, without long-term current use of insulin (HCC)  Diabetes management per Internal Medicine   Lab Results   Component Value Date    HGBA1C 6.2 (H) 09/12/2024       Orthopedics service will follow.    Subjective   69 y.o.female seen and evaluated at beside. Patient working with PT upon evaluation found to have significant posterior knee pain. Denies fever, chills. Eating and drinking OK.     Objective :  Temp:  [97.5 °F (36.4 °C)-97.8 °F (36.6 °C)] 97.5 °F (36.4 °C)  HR:  [] 90  BP: (118-149)/(56-84) 127/61  Resp:  [14-21] 16  SpO2:  [91 %-99 %] 94 %  O2 Device: None (Room air)    Physical Exam  Vitals and nursing note reviewed.   Constitutional:       General: She is not in acute distress.     Appearance: She is well-developed.   HENT:      Head: Normocephalic and atraumatic.   Eyes:      Conjunctiva/sclera: Conjunctivae normal.   Cardiovascular:      Rate and Rhythm: Normal rate and regular rhythm.      Heart sounds: No murmur heard.  Pulmonary:      Effort: Pulmonary effort is normal. No respiratory distress.      Breath sounds: Normal breath sounds.   Abdominal:      Palpations: Abdomen is soft.       "Tenderness: There is no abdominal tenderness.   Musculoskeletal:         General: No swelling.      Cervical back: Neck supple.   Skin:     General: Skin is warm and dry.      Capillary Refill: Capillary refill takes less than 2 seconds.   Neurological:      Mental Status: She is alert.   Psychiatric:         Mood and Affect: Mood normal.       Musculoskeletal: leftlower extremity   Dressing c/d/I   Thigh and calf musculature soft and compressible   TTP over posterior aspect of knee at insertion of hamstrings tendon  Full sensation to digits   Full ROM of digits   Motor intact to +FHL/EHL, +ankle dorsi/plantar flexion  2+ DP pulse       Lab Results: I have reviewed the following results:  Recent Labs     10/08/24  0602   WBC 12.95*   HGB 10.0*   HCT 33.7*      BUN 13   CREATININE 0.70     Blood Culture:  No results found for: \"BLOODCX\"  Wound Culture: No results found for: \"WOUNDCULT\"  "

## 2024-10-08 NOTE — PLAN OF CARE
Problem: PHYSICAL THERAPY ADULT  Goal: Performs mobility at highest level of function for planned discharge setting.  See evaluation for individualized goals.  Description: Treatment/Interventions: Functional transfer training, Elevations, LE strengthening/ROM, Therapeutic exercise, Endurance training, Patient/family training, Equipment eval/education, Bed mobility, Gait training, Cognitive reorientation, Spoke to nursing, OT  Equipment Recommended: Walker       See flowsheet documentation for full assessment, interventions and recommendations.  Outcome: Progressing  Note: Prognosis: Fair  Problem List: Decreased strength, Decreased range of motion, Impaired balance, Decreased endurance, Decreased mobility, Obesity, Decreased skin integrity, Pain, Orthopedic restrictions (gait deviations)  Assessment: Although Noris did make mobility progress since initial evaluation, she continues to require treatment and min assist for performing transfers, as well as with ambulation.  Requires verbal instruction for proper techniques especially with walker management> sequencing with walker.  Atempted to perform stairs, however patient with symptomatic lightheadedness during mobility.  Skilled PT recommended to progress patient towards treatment goals.  Barriers to Discharge: Decreased caregiver support, Inaccessible home environment     Rehab Resource Intensity Level, PT: III (Minimum Resource Intensity) (With improvements in mobility, performance and stairs, as well as pain control.)    See flowsheet documentation for full assessment.

## 2024-10-08 NOTE — PHYSICAL THERAPY NOTE
Physical Therapy Cancellation Note             10/08/24 7817   PT Last Visit   PT Visit Date 10/08/24   Note Type   Note Type Cancelled Session   Cancel Reasons Other  (pain)   Assessment   Assessment Attempted PT intervention twice this afternoon. Both attempts pt was unable to participate due to pain. Initial attempt pt with 9/10 pain. Second attempt pt with 10/10 pain. Initially pt was given oxy and robaxin but pain did not decrease. On second attempt pt was getting Diluadid due to increased pain. PT will follow and attempt to see pt when PT schedule will allow     Declan Lehman

## 2024-10-08 NOTE — ASSESSMENT & PLAN NOTE
Patient with history of left knee DJD who failed conservative treatment  Underwent elective left total knee arthroplasty on 10/7/2024 with orthopedic surgery  Received left-sided single shot adductor canal block with Exparel for postoperative analgesia  Denies any residual sensory/motor deficits secondary to block.    Multimodal analgesia:  Change as needed Tylenol to 975 mg every 8 hours scheduled  Start Robaxin 750 mg every 6 hours scheduled for muscle spasm/tightness  Start gabapentin 100 mg daily at bedtime  Will hold off on treatment with NSAIDs given prior reaction to naproxen  Oxycodone 5 mg every 4 hours as needed for moderate pain  Oxycodone 10 mg every 4 hours as needed for severe pain  If oxycodone continues to be effective despite the above changes may need to consider opioid rotation  IV Dilaudid 0.5 mg every 2 hours for breakthrough pain x 48 hours  Add Narcan as needed for respiratory depression/opioid reversal  Continue bowel regimen to prevent opioid-induced constipation

## 2024-10-08 NOTE — ASSESSMENT & PLAN NOTE
Presentation: Patient presented to [facility] for total left knee arthroplasty secondary to failed conservative treatment of left knee DJD with varus deformity and limited flexion. Patient was admitted for continued pain control post operatively.  POD #0.  Continue IV Ancef.  Pain control per primary service.  Bowel regimen.  APS consult.

## 2024-10-08 NOTE — CASE MANAGEMENT
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  (HCC) 2024     Type 2 diabetes mellitus, without long-term current use of insulin (HCC) 2023     Hyperlipidemia, unspecified 2021     Primary hypertension 2021       LOS (days): 0  Geometric Mean LOS (GMLOS) (days):   Days to GMLOS:     OBJECTIVE:              Current admission status: Outpatient Surgery       Preferred Pharmacy:   [id number] - [address] [address]  Phone: [phone] Fax: [phone]    Primary Care Provider: Maykel Lopez MD    Primary Insurance: [facility]  Secondary Insurance:     ASSESSMENT:  Active Health Care Proxies    There are no active Health Care Proxies on file.                 Readmission Root Cause  30 Day Readmission: No    Patient Information  Admitted from:: Home  Mental Status: Alert  During Assessment patient was accompanied by: Not accompanied during assessment  Assessment information provided by:: Patient  Primary Caregiver: Self  Support Systems: Self, Spouse/significant other  County of Residence: [place]  What city do you live in?: [place]                               Social Determinants of Health (SDOH)      Flowsheet Row Most Recent Value   Housing Stability    In the last 12 months, was there a time when you were not able to pay the mortgage or rent on time? N   In the past 12 months, how many times have you moved where you were living? 0   At any  time in the past 12 months, were you homeless or living in a shelter (including now)? N   Transportation Needs    In the past 12 months, has lack of transportation kept you from medical appointments or from getting medications? no   In the past 12 months, has lack of transportation kept you from meetings, work, or from getting things needed for daily living? No   Food Insecurity    Within the past 12 months, you worried that your food would run out before you got the money to buy more. Never true   Within the past 12 months, the food you bought just didn't last and you didn't have money to get more. Never true   Utilities    In the past 12 months has the electric, gas, oil, or water company threatened to shut off services in your home? No            DISCHARGE DETAILS:    Discharge planning discussed with:: Patient at bedside.  Freedom of Choice: Yes  Comments - Freedom of Choice: Pt will be doing OPPT on discharge.     DME Referral Provided  Referral made for DME?: Yes  DME referral completed for the following items:: Bedside Commode  DME Supplier Name:: [facility]    Other Referral/Resources/Interventions Provided:  Interventions: DME  Referral Comments: CM ordered mark bedside commode for pt to be delivered to her home. Pt is aware and agreeable there will be a $75 up charge as she does not meet the weight requirement for the mark commode. Pt aware we do not have any mark commodes on site and agreeable to commode being delivered to her home.

## 2024-10-08 NOTE — OCCUPATIONAL THERAPY NOTE
Occupational Therapy Progress Note     Patient Name: Noris Heck  Today's Date: 10/8/2024  Problem List  Principal Problem:    S/P total knee arthroplasty, left  Active Problems:    Obesity (BMI 35.0-39.9 without comorbidity)    Type 2 diabetes mellitus, without long-term current use of insulin (HCC)    Hyperlipidemia, unspecified    Primary hypertension        10/08/24 0843   OT Last Visit   OT Visit Date 10/08/24   Note Type   Note Type Treatment   Pain Assessment   Pain Assessment Tool 0-10   Pain Score 7   Pain Location/Orientation Orientation: Left;Location: Knee   Pain Onset/Description Onset: Ongoing;Descriptor: Aching   Effect of Pain on Daily Activities limits comfort, activity tolerance, speed of ADLs/mobility   Patient's Stated Pain Goal No pain   Hospital Pain Intervention(s) Cold applied;Repositioned;Ambulation/increased activity;Emotional support   Restrictions/Precautions   Weight Bearing Precautions Per Order Yes   LLE Weight Bearing Per Order WBAT  (s/p elective L TKA 10/7 w/ Dr. Yepez)   Other Precautions Chair Alarm;Bed Alarm;WBS;Multiple lines;Fall Risk;Pain   Lifestyle   Autonomy Pt lives w/ spouse in a 1 level house and is (I) w/ ADLs PTA, no AD vs SPC, (-) falls, (+)    Reciprocal Relationships Supportive spouse   Service to Others Retired  for [employer]   Intrinsic Gratification Pt enjoys walking, used to enjoy hiking w/ spouse   ADL   Where Assessed Commode   Eating Assistance 7  Independent   Eating Deficit Setup;Beverage management   Eating Comments sitting EOB   Grooming Assistance 5  Supervision/Setup   Grooming Deficit Setup;Standing with assistive device;Wash/dry hands   Grooming Comments standing at sink in bathroom, pt noted to offload weight onto RLE. S for safety w/u UE support   LB Dressing Assistance 5  Supervision/Setup   LB Dressing Deficit Setup;Requires assistive device for steadying;Verbal cueing;Supervision/safety;Increased time to complete;Thread RLE  "into pants;Thread LLE into pants;Pull up over hips   LB Dressing Comments sitting in recliner, VC for technique, increased time to thread over LLE. S in stance to pull over hips. Edu on use of LHAD however pt reports he  will assist   Toileting Assistance  5  Supervision/Setup   Toileting Deficit Setup;Verbal cueing;Supervison/safety;Increased time to complete;Bedside commode;Grab bar use;Clothing management up;Clothing management down;Perineal hygiene   Toileting Comments S to manage underwear down in stance, hygiene sitting on toilet, S to manage underwear up over hips   Functional Standing Tolerance   Time 2-3 minutes   Activity hand hygiene   Comments S in stance w/ and w/o UE support   Bed Mobility   Supine to Sit 4  Minimal assistance   Additional items Assist x 1;HOB elevated;Bedrails;Increased time required;Verbal cues;LE management   Additional Comments Able to sit EOB w/ S; OOB to recliner at end of session. Denies dizziness this session   Transfers   Sit to Stand 5  Supervision   Additional items Assist x 1;Increased time required;Verbal cues   Stand to Sit 5  Supervision   Additional items Assist x 1;Increased time required;Verbal cues   Additional Comments VC for hand placement, increased time to achieve stance due to pain.   Functional Mobility   Functional Mobility 5  Supervision   Additional Comments Household distance w/ RW and S. Limited by pain. Initially w/ limited weight bearing LLE improved w/ repetition   Additional items Rolling walker   Toilet Transfers   Toilet Transfer From Rolling walker   Toilet Transfer Type To and from   Toilet Transfer to Extra wide bedside commode  (over toilet)   Toilet Transfer Technique Ambulating   Toilet Transfers Supervision;Verbal cues   Toilet Transfers Comments BSC over toilet, VC for use of commode armrests   Subjective   Subjective \"I am just nervous about getting to PT on Thursday\"   Cognition   Overall Cognitive Status WFL   Arousal/Participation " Alert;Cooperative   Attention Within functional limits   Orientation Level Oriented X4   Memory Within functional limits   Following Commands Follows multistep commands with increased time or repetition   Comments Pleasant and agreeable. Good carryover of edu.   Activity Tolerance   Activity Tolerance Patient limited by fatigue;Patient limited by pain   Medical Staff Made Aware Spoke to DARIUS Mazariegos   Assessment   Assessment Pt seen for OT treatment on 10/8 s/p admit to [facility] w/ S/P total knee arthroplasty, left. Pt agreeable to OT treatment session, upon arrival patient was found supine in bed. Patient participated in skilled OT interventions to address ADL tasks, functional transfers, and overall activity tolerance and participation. In comparison to previous session, Noris demonstrated improvements in functional activity tolerance, pain management and performance of ADLs/mobility this session and was able to manage household distance to bathroom for toilet but is continuing to perform below baseline due to pain, limited weight bearing through LLE, decreased standing tolerance, dynamic standing balance and endurance. Personal factors that continue to impact DC include: difficulty completing ADLs and difficulty completing IADLs. Patient to benefit from continued IPOT treatment to address deficits as defined above and maximize level of functional independence with ADLs and functional mobility. Goals remain appropriate. Continue per POC.   Plan   Treatment Interventions ADL retraining;Functional transfer training;Endurance training;Patient/family training;Equipment evaluation/education;Compensatory technique education;Energy conservation   Goal Expiration Date 10/17/24   OT Treatment Day 1   OT Frequency 3-5x/wk   Discharge Recommendation   Rehab Resource Intensity Level, OT No post-acute rehabilitation needs  (increased social support for ADLs/IADLs PRN (wife reports spouse will assist))   Equipment  Recommended (S)  Bedside commode   Commode Type (S)  Wide   Additional Comments  The patient's raw score on the -PAC Daily Activity Inpatient Short Form is 21. A raw score of greater than or equal to 19 suggests the patient may benefit from discharge to home. Please refer to the recommendation of the Occupational Therapist for safe discharge planning.   -PAC Daily Activity Inpatient   Lower Body Dressing 3   Bathing 3   Toileting 3   Upper Body Dressing 4   Grooming 4   Eating 4   Daily Activity Raw Score 21   Daily Activity Standardized Score (Calc for Raw Score >=11) 44.27   AM-PAC Applied Cognition Inpatient   Following a Speech/Presentation 4   Understanding Ordinary Conversation 4   Taking Medications 4   Remembering Where Things Are Placed or Put Away 4   Remembering List of 4-5 Errands 4   Taking Care of Complicated Tasks 4   Applied Cognition Raw Score 24   Applied Cognition Standardized Score 62.21   End of Consult   Patient Position at End of Consult Bedside chair;Bed/Chair alarm activated;All needs within reach   Nurse Communication Nurse aware of consult     GOALS:     *Goals established to promote patient goal of to have less pain in my L knee:       *Patient will perform grooming tasks standing at sink with (S) in order to increase overall independence (MET: upgrade to mod (I))     *LB ADL with (S) using AE prn for inc'd independence with self care (MET: upgrade to mod (I))     *Toileting with (S) for clothing management and hygiene to increase hygiene/thoroughness in order to reduce caregiver burden (MET: upgrade to mod (I))     *Pt will demonstrate use of long handled AE during 100% of tx sessions for increased ADL safety and independence following D/C      *ADL transfers with (S) for inc'd independence with ADLs/purposeful tasks (MET: upgrade to mod (I))     *Bed mobility- (S) for inc'd independence to manage own comfort and initiate EOB & OOB purposeful tasks (PROGRESSING)     *Patient will  increase functional mobility to and from bathroom with rolling walker with supervision to increase independence with toileting (MET: upgrade to mod (I))     *Patient will increase OOB/sitting tolerance to 2-4 hours per day to increase participation in self-care and leisure tasks with no s/s of exertion.      *Increase stand tolerance x 3-5  m for inc'd tolerance with standing purposeful tasks including grooming/self-care (PROGRESSING)     *Patient will improve functional activity tolerance to 15 minutes of sustained functional tasks to increase participation in basic self-care and decrease assistance level. (MET)     *Caregiver will demonstrate good body mechanics and safe technique when assisting pt during functional ADLs/transfers to maximize safety upon DC.     MINI Fuller, OTR/L  [place] License [id number]  [place] License [id number]

## 2024-10-08 NOTE — PROGRESS NOTES
Progress Note - Hospitalist   Name: Noris Heck 69 y.o. female I MRN: [id number]  Unit/Bed#: S -01 I Date of Admission: 10/7/2024   Date of Service: 10/8/2024 I Hospital Day: 0    Assessment & Plan  S/P total knee arthroplasty, left  Presentation: Patient presented to 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 Patient was admitted for continued pain control post operatively.  POD #1  Still having pain, APS consulted - scheduled tylenol & robaxin added, gabapentin HS added  Bowel regimen.  Obesity (BMI 35.0-39.9 without comorbidity)  Encouraged lifestyle modifications.  Type 2 diabetes mellitus, without long-term current use of insulin ([facility])    Lab Results   Component Value Date    HGBA1C 6.2 (H) 09/12/2024   Home regimen of Metformin and Ozempic, will hold while inpatient.  Accu checks and SSI.  Hypoglycemia protocol.  Hyperlipidemia, unspecified  Continue statin.  Primary hypertension  Blood pressure is reviewed and acceptable.  Holding off on home medications of Amlodipine, ARB and HCTZ  Monitor blood pressure.    VTE Pharmacologic Prophylaxis: VTE Score: 3 Moderate Risk (Score 3-4) - Pharmacological DVT Prophylaxis Ordered: enoxaparin (Lovenox).    Mobility:   Basic Mobility Inpatient Raw Score: 16  JH-HLM Goal: 5: Stand one or more mins  JH-HLM Achieved: 6: Walk 10 steps or more  JH-HLM Goal achieved. Continue to encourage appropriate mobility.    Patient Centered Rounds: I performed bedside rounds with nursing staff today.   Discussions with Specialists or Other Care Team Provider:     Education and Discussions with Family / Patient:  patient.     Current Length of Stay: 0 day(s)  Current Patient Status: Inpatient   Certification Statement: The patient, admitted on an observation basis, will now require > 2 midnight hospital stay due to pain per primary team  Discharge Plan: [facility] is following this  patient on consult. They are medically stable for discharge when deemed appropriate by primary service.    Code Status: Level 1 - Full Code    Subjective   Having pain in back of left knee, no other symptoms    Objective :  Temp:  [97.5 °F (36.4 °C)-99 °F (37.2 °C)] 99 °F (37.2 °C)  HR:  [] 98  BP: (118-137)/(61-69) 120/63  Resp:  [15-17] 15  SpO2:  [90 %-94 %] 90 %    Body mass index is 38.83 kg/m².     Input and Output Summary (last 24 hours):     Intake/Output Summary (Last 24 hours) at 10/8/2024 1814  Last data filed at 10/8/2024 1114  Gross per 24 hour   Intake 1085 ml   Output 1050 ml   Net 35 ml       Physical Exam  Vitals reviewed.   HENT:      Head: Normocephalic and atraumatic.   Cardiovascular:      Rate and Rhythm: Normal rate and regular rhythm.   Pulmonary:      Effort: Pulmonary effort is normal. No respiratory distress.      Breath sounds: Normal breath sounds.   Abdominal:      General: There is no distension.      Palpations: Abdomen is soft.      Tenderness: There is no abdominal tenderness.   Musculoskeletal:      Right lower leg: No edema.      Left lower leg: No edema.   Neurological:      Mental Status: She is alert and oriented to person, place, and time.           Lines/Drains:              Lab Results: I have reviewed the following results:   Results from last 7 days   Lab Units 10/08/24  0602   WBC Thousand/uL 12.95*   HEMOGLOBIN g/dL 10.0*   HEMATOCRIT % 33.7*   PLATELETS Thousands/uL 248     Results from last 7 days   Lab Units 10/08/24  0602   SODIUM mmol/L 135   POTASSIUM mmol/L 3.9   CHLORIDE mmol/L 102   CO2 mmol/L 25   BUN mg/dL 13   CREATININE mg/dL 0.70   ANION GAP mmol/L 8   CALCIUM mg/dL 9.7   GLUCOSE RANDOM mg/dL 124         Results from last 7 days   Lab Units 10/08/24  1735 10/08/24  1048 10/08/24  0708 10/08/24  0004 10/07/24  1215 10/07/24  0827   POC GLUCOSE mg/dl 182* 161* 124 131 125 105               Recent Cultures (last 7 days):         Imaging Results Review:  No pertinent imaging studies reviewed.  Other Study Results Review: No additional pertinent studies reviewed.    Last 24 Hours Medication List:     Current Facility-Administered Medications:     acetaminophen (TYLENOL) tablet 975 mg, Q8H NANDO    ascorbic acid (VITAMIN C) tablet 500 mg, BID    calcium carbonate (TUMS) chewable tablet 1,000 mg, Daily PRN    docusate sodium (COLACE) capsule 100 mg, BID    [START ON 10/9/2024] enoxaparin (LOVENOX) subcutaneous injection 40 mg, Daily    folic acid (FOLVITE) tablet 1 mg, Daily    gabapentin (NEURONTIN) capsule 100 mg, HS    HYDROmorphone (DILAUDID) injection 0.5 mg, Q2H PRN    insulin lispro (HumALOG/ADMELOG) 100 units/mL subcutaneous injection 1-5 Units, HS    insulin lispro (HumALOG/ADMELOG) 100 units/mL subcutaneous injection 1-6 Units, TID AC **AND** Fingerstick Glucose (POCT), TID AC    lactated ringers bolus 1,000 mL, Once PRN **AND** lactated ringers bolus 1,000 mL, Once PRN    lactated ringers infusion, Continuous, Last Rate: 75 mL/hr (10/08/24 1114)    methocarbamol (ROBAXIN) tablet 750 mg, Q6H NANDO    naloxone (NARCAN) 0.04 mg/mL syringe 0.04 mg, Q1MIN PRN    ondansetron (ZOFRAN) injection 4 mg, Q6H PRN    oxyCODONE (ROXICODONE) immediate release tablet 10 mg, Q4H PRN    oxyCODONE (ROXICODONE) IR tablet 5 mg, Q4H PRN    pravastatin (PRAVACHOL) tablet 80 mg, Daily With Dinner    senna (SENOKOT) tablet 8.6 mg, Daily    sodium chloride 0.9 % bolus 1,000 mL, Once PRN **AND** sodium chloride 0.9 % bolus 1,000 mL, Once PRN    Administrative Statements   Today, Patient Was Seen By: Radhika Jc MD  I have spent a total time of 25 minutes in caring for this patient on the day of the visit/encounter including Patient and family education, Documenting in the medical record, Reviewing / ordering tests, medicine, procedures  , and Obtaining or reviewing history  .    **Please Note: This note may have been constructed using a voice recognition system.**

## 2024-10-08 NOTE — ASSESSMENT & PLAN NOTE
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 Patient was admitted for continued pain control post operatively.  POD #1  Still having pain, APS consulted - scheduled tylenol & robaxin added, gabapentin HS added  Bowel regimen.

## 2024-10-09 ENCOUNTER — APPOINTMENT (INPATIENT)
Dept: CT IMAGING | Facility: HOSPITAL | Age: 69
DRG: 470 | End: 2024-10-09
Payer: COMMERCIAL

## 2024-10-09 ENCOUNTER — TELEPHONE (OUTPATIENT)
Age: 69
End: 2024-10-09

## 2024-10-09 PROBLEM — R00.0 SINUS TACHYCARDIA: Status: ACTIVE | Noted: 2024-10-09

## 2024-10-09 LAB
ANION GAP SERPL CALCULATED.3IONS-SCNC: 5 MMOL/L (ref 4–13)
BUN SERPL-MCNC: 11 MG/DL (ref 5–25)
CALCIUM SERPL-MCNC: 9.8 MG/DL (ref 8.4–10.2)
CHLORIDE SERPL-SCNC: 102 MMOL/L (ref 96–108)
CO2 SERPL-SCNC: 28 MMOL/L (ref 21–32)
CREAT SERPL-MCNC: 0.6 MG/DL (ref 0.6–1.3)
ERYTHROCYTE [DISTWIDTH] IN BLOOD BY AUTOMATED COUNT: 14.3 % (ref 11.6–15.1)
GFR SERPL CREATININE-BSD FRML MDRD: 91 ML/MIN/1.73SQ M
GLUCOSE SERPL-MCNC: 117 MG/DL (ref 65–140)
GLUCOSE SERPL-MCNC: 117 MG/DL (ref 65–140)
GLUCOSE SERPL-MCNC: 119 MG/DL (ref 65–140)
GLUCOSE SERPL-MCNC: 141 MG/DL (ref 65–140)
GLUCOSE SERPL-MCNC: 144 MG/DL (ref 65–140)
HCT VFR BLD AUTO: 31.9 % (ref 34.8–46.1)
HGB BLD-MCNC: 10.3 G/DL (ref 11.5–15.4)
MCH RBC QN AUTO: 29.8 PG (ref 26.8–34.3)
MCHC RBC AUTO-ENTMCNC: 32.3 G/DL (ref 31.4–37.4)
MCV RBC AUTO: 92 FL (ref 82–98)
PLATELET # BLD AUTO: 239 THOUSANDS/UL (ref 149–390)
PMV BLD AUTO: 10.7 FL (ref 8.9–12.7)
POTASSIUM SERPL-SCNC: 4.3 MMOL/L (ref 3.5–5.3)
RBC # BLD AUTO: 3.46 MILLION/UL (ref 3.81–5.12)
SODIUM SERPL-SCNC: 135 MMOL/L (ref 135–147)
WBC # BLD AUTO: 12.27 THOUSAND/UL (ref 4.31–10.16)

## 2024-10-09 PROCEDURE — 99232 SBSQ HOSP IP/OBS MODERATE 35: CPT | Performed by: HOSPITALIST

## 2024-10-09 PROCEDURE — 97116 GAIT TRAINING THERAPY: CPT

## 2024-10-09 PROCEDURE — 71275 CT ANGIOGRAPHY CHEST: CPT

## 2024-10-09 PROCEDURE — 80048 BASIC METABOLIC PNL TOTAL CA: CPT | Performed by: PHYSICIAN ASSISTANT

## 2024-10-09 PROCEDURE — 99024 POSTOP FOLLOW-UP VISIT: CPT | Performed by: PHYSICIAN ASSISTANT

## 2024-10-09 PROCEDURE — 82948 REAGENT STRIP/BLOOD GLUCOSE: CPT

## 2024-10-09 PROCEDURE — 97535 SELF CARE MNGMENT TRAINING: CPT

## 2024-10-09 PROCEDURE — 97530 THERAPEUTIC ACTIVITIES: CPT

## 2024-10-09 PROCEDURE — 99232 SBSQ HOSP IP/OBS MODERATE 35: CPT | Performed by: PHYSICIAN ASSISTANT

## 2024-10-09 PROCEDURE — 97110 THERAPEUTIC EXERCISES: CPT

## 2024-10-09 PROCEDURE — 93005 ELECTROCARDIOGRAM TRACING: CPT

## 2024-10-09 PROCEDURE — 85027 COMPLETE CBC AUTOMATED: CPT | Performed by: PHYSICIAN ASSISTANT

## 2024-10-09 RX ORDER — BISACODYL 10 MG
10 SUPPOSITORY, RECTAL RECTAL DAILY PRN
Status: DISCONTINUED | OUTPATIENT
Start: 2024-10-09 | End: 2024-10-10 | Stop reason: HOSPADM

## 2024-10-09 RX ORDER — POLYETHYLENE GLYCOL 3350 17 G/17G
17 POWDER, FOR SOLUTION ORAL DAILY
Status: DISCONTINUED | OUTPATIENT
Start: 2024-10-09 | End: 2024-10-10 | Stop reason: HOSPADM

## 2024-10-09 RX ADMIN — ACETAMINOPHEN 975 MG: 325 TABLET ORAL at 05:33

## 2024-10-09 RX ADMIN — OXYCODONE HYDROCHLORIDE AND ACETAMINOPHEN 500 MG: 500 TABLET ORAL at 17:06

## 2024-10-09 RX ADMIN — DOCUSATE SODIUM 100 MG: 100 CAPSULE, LIQUID FILLED ORAL at 17:06

## 2024-10-09 RX ADMIN — GABAPENTIN 100 MG: 100 CAPSULE ORAL at 22:13

## 2024-10-09 RX ADMIN — METHOCARBAMOL TABLETS 750 MG: 750 TABLET, COATED ORAL at 17:06

## 2024-10-09 RX ADMIN — OXYCODONE HYDROCHLORIDE 10 MG: 5 TABLET ORAL at 01:17

## 2024-10-09 RX ADMIN — METHOCARBAMOL TABLETS 750 MG: 750 TABLET, COATED ORAL at 05:33

## 2024-10-09 RX ADMIN — BISACODYL 10 MG: 10 SUPPOSITORY RECTAL at 10:42

## 2024-10-09 RX ADMIN — SODIUM CHLORIDE 500 ML: 0.9 INJECTION, SOLUTION INTRAVENOUS at 12:10

## 2024-10-09 RX ADMIN — SENNOSIDES 8.6 MG: 8.6 TABLET, FILM COATED ORAL at 08:48

## 2024-10-09 RX ADMIN — ACETAMINOPHEN 975 MG: 325 TABLET ORAL at 22:13

## 2024-10-09 RX ADMIN — OXYCODONE HYDROCHLORIDE 10 MG: 5 TABLET ORAL at 12:20

## 2024-10-09 RX ADMIN — POLYETHYLENE GLYCOL 3350 17 G: 17 POWDER, FOR SOLUTION ORAL at 10:42

## 2024-10-09 RX ADMIN — OXYCODONE HYDROCHLORIDE AND ACETAMINOPHEN 500 MG: 500 TABLET ORAL at 08:48

## 2024-10-09 RX ADMIN — FOLIC ACID 1 MG: 1 TABLET ORAL at 08:48

## 2024-10-09 RX ADMIN — DOCUSATE SODIUM 100 MG: 100 CAPSULE, LIQUID FILLED ORAL at 08:48

## 2024-10-09 RX ADMIN — PRAVASTATIN SODIUM 80 MG: 80 TABLET ORAL at 17:06

## 2024-10-09 RX ADMIN — METHOCARBAMOL TABLETS 750 MG: 750 TABLET, COATED ORAL at 12:20

## 2024-10-09 RX ADMIN — ENOXAPARIN SODIUM 40 MG: 40 INJECTION SUBCUTANEOUS at 01:17

## 2024-10-09 RX ADMIN — IOHEXOL 85 ML: 350 INJECTION, SOLUTION INTRAVENOUS at 15:44

## 2024-10-09 RX ADMIN — ACETAMINOPHEN 975 MG: 325 TABLET ORAL at 13:52

## 2024-10-09 RX ADMIN — OXYCODONE HYDROCHLORIDE 10 MG: 5 TABLET ORAL at 17:12

## 2024-10-09 RX ADMIN — OXYCODONE HYDROCHLORIDE 10 MG: 5 TABLET ORAL at 07:49

## 2024-10-09 NOTE — OCCUPATIONAL THERAPY NOTE
Occupational Therapy Progress Note     Patient Name: Noris Heck  Today's Date: 10/9/2024  Problem List  Principal Problem:    S/P total knee arthroplasty, left  Active Problems:    Obesity (BMI 35.0-39.9 without comorbidity)    Type 2 diabetes mellitus, without long-term current use of insulin (HCC)    Hyperlipidemia, unspecified    Primary hypertension        10/09/24 1002   OT Last Visit   OT Visit Date 10/09/24   Note Type   Note Type Treatment   Pain Assessment   Pain Assessment Tool 0-10   Pain Score 7   Pain Location/Orientation Orientation: Left;Location: Knee   Pain Onset/Description Onset: Ongoing;Descriptor: Aching   Effect of Pain on Daily Activities limits comfort, activity tolerance, speed of ADLs/mobility   Patient's Stated Pain Goal No pain   Hospital Pain Intervention(s) Repositioned;Ambulation/increased activity;Emotional support   Restrictions/Precautions   Weight Bearing Precautions Per Order Yes   LLE Weight Bearing Per Order WBAT  (s/p elective L TKA 10/7 Dr Yepez)   Other Precautions Chair Alarm;Bed Alarm;WBS;Fall Risk;Pain   Lifestyle   Autonomy Pt lives w/ spouse in a 1 level house and is (I) w/ ADLs PTA, no AD vs SPC, (-) falls, (+)    Reciprocal Relationships Supportive spouse   Service to Others Retired  for [employer]   Intrinsic Gratification Pt enjoys walking, used to enjoy hiking w/ spouse   ADL   Where Assessed Chair   Eating Assistance 7  Independent   Eating Deficit Setup;Beverage management   Eating Comments from bedside table   Grooming Assistance 5  Supervision/Setup   Grooming Deficit Setup;Supervision/safety;Increased time to complete;Standing with assistive device;Wash/dry hands   Grooming Comments standing at sink in bathroom, endorses dizziness in stance   UB Dressing Assistance 6  Modified independent   UB Dressing Deficit Setup;Thread RUE;Thread LUE;Pull over head;Pull down in back   UB Dressing Comments sitting in recliner   LB Dressing Assistance 4   "Minimal Assistance   LB Dressing Deficit Setup;Steadying;Requires assistive device for steadying;Verbal cueing;Supervision/safety;Increased time to complete;Thread RLE into pants;Thread LLE into pants;Pull up over hips;Don/doff R shoe;Don/doff L shoe   LB Dressing Comments sitting in recliner, VC for technique, S in stance for pulling over hips. Min A to don L shoe. Required increased time and rest breaks due to dizziness w/ forward reaching and pain   Toileting Assistance  5  Supervision/Setup   Toileting Deficit Setup;Supervison/safety;Increased time to complete;Bedside commode;Grab bar use;Clothing management up;Clothing management down;Perineal hygiene   Toileting Comments S to manage clothing down/up, saji care sitting on BS   Functional Standing Tolerance   Time 2-3 minutes   Activity hand hygiene s/p toileting   Comments S in stance w/ unilateral support on sinktop. Noted to bare minimal weight through LLE   Bed Mobility   Additional Comments Pt received in recliner upon arrival, returned at end of session   Transfers   Sit to Stand 5  Supervision   Additional items Assist x 1;Armrests;Increased time required;Verbal cues   Stand to Sit 5  Supervision   Additional items Assist x 1;Armrests;Increased time required;Verbal cues   Additional Comments good recall of hand placement, body positioning   Functional Mobility   Functional Mobility 5  Supervision   Additional Comments Household distance to bathroom w/ RW and close S. Slow, antalgic, required multiple standing rest breaks. Limited by dizziness and pain w/ weight bearing.   Additional items Rolling walker   Toilet Transfers   Toilet Transfer From Rolling walker   Toilet Transfer Type To and from   Toilet Transfer to Extra wide bedside commode  (over toilet)   Toilet Transfer Technique Ambulating   Toilet Transfers Supervision   Toilet Transfers Comments BS over toilet, good use of grab bar   Subjective   Subjective \"I think I can get home today\" "   Cognition   Overall Cognitive Status WFL   Arousal/Participation Alert;Cooperative   Attention Within functional limits   Orientation Level Oriented X4   Memory Within functional limits   Following Commands Follows multistep commands with increased time or repetition   Comments Pleasant and agreeable, required VC for recall of compensatory technique for LB dressing.   Activity Tolerance   Activity Tolerance Patient limited by fatigue;Patient limited by pain  (dizziness/lightheadedness)   Medical Staff Made Aware Spoke to PT DARIUS Manriquez, Ortho ABIMBOLA Villegas, DERICK Bates RN edite   Assessment   Assessment Pt seen for OT treatment on 10/9 s/p admit to [facility] w/ S/P total knee arthroplasty, left. Pt agreeable to OT treatment session, upon arrival patient was found seated OOB to Recliner. Patient participated in skilled OT interventions to address ADL tasks, functional transfers, and overall activity tolerance and participation. In comparison to previous session, Noris demonstrated improvements in safety/compensatory techniques for ADLs/mobility, pain control and performance of ADLs to S level but is continuing to perform below baseline due to limited activity tolerance, dizziness during activity and pain as well as impaired standing tolerance, weight bearing through LLE and speed of ADLs/mobility. Pt does have adequate social support from spouse at home for light assist w/ ADLs/mobility and supervision for safety. At this time it is felt that pt would be benefit from level III intensity resources upon DC and modification of activity including use of BSC during day to manage symptoms. Pt verbalized understanding. Personal factors that continue to impact DC include: inaccessibility to community, difficulty completing ADLs, and difficulty completing IADLs. Patient to benefit from continued IPOT treatment to address deficits as defined above and maximize level of functional independence with ADLs and functional mobility.  Goals remain appropriate. Continue per POC.   Plan   Treatment Interventions ADL retraining;Functional transfer training;Endurance training;Patient/family training;Equipment evaluation/education;Compensatory technique education;Continued evaluation;Energy conservation   Goal Expiration Date 10/17/24   OT Treatment Day 1   OT Frequency 3-5x/wk   Discharge Recommendation   Rehab Resource Intensity Level, OT III (Minimum Resource Intensity)  (increased social support from spouse for ADLs/IADLs. Despite continued challenges w/ pain and dizziness, pt reports feeling comfortable DCing home today w/ spouse support and verbalized understanding of modifications for home setting to increase safety/tolerance)   Equipment Recommended Bedside commode   Commode Type Wide   Additional Comments  The patient's raw score on the AM-PAC Daily Activity Inpatient Short Form is 20. A raw score of greater than or equal to 19 suggests the patient may benefit from discharge to home. Please refer to the recommendation of the Occupational Therapist for safe discharge planning.   -PAC Daily Activity Inpatient   Lower Body Dressing 3   Bathing 3   Toileting 3   Upper Body Dressing 4   Grooming 3   Eating 4   Daily Activity Raw Score 20   Daily Activity Standardized Score (Calc for Raw Score >=11) 42.03   AM-PAC Applied Cognition Inpatient   Following a Speech/Presentation 4   Understanding Ordinary Conversation 4   Taking Medications 4   Remembering Where Things Are Placed or Put Away 4   Remembering List of 4-5 Errands 4   Taking Care of Complicated Tasks 4   Applied Cognition Raw Score 24   Applied Cognition Standardized Score 62.21   End of Consult   Patient Position at End of Consult Bedside chair;Bed/Chair alarm activated;All needs within reach   Nurse Communication Nurse aware of consult     GOALS:     *Goals established to promote patient goal of to have less pain in my L knee:       *Patient will perform grooming tasks standing at sink  with mod (I) in order to increase overall independence (PROGRESSING)     *LB ADL with mod (I) using AE prn for inc'd independence with self care (PROGRESSING)     *Toileting with mod (I) for clothing management and hygiene to increase hygiene/thoroughness in order to reduce caregiver burden (PROGRESSING)     *ADL transfers with mod (I) for inc'd independence with ADLs/purposeful tasks (PROGRESSING)     *Bed mobility- (S) for inc'd independence to manage own comfort and initiate EOB & OOB purposeful tasks      *Patient will increase functional mobility to and from bathroom with rolling walker mod (I) to increase independence with toileting (PROGRESSING)     *Patient will increase OOB/sitting tolerance to 2-4 hours per day to increase participation in self-care and leisure tasks with no s/s of exertion.  (PROGRESSING)     *Increase stand tolerance x 3-5  m for inc'd tolerance with standing purposeful tasks including grooming/self-care (PROGRESSING)     *Patient will improve functional activity tolerance to 15 minutes of sustained functional tasks to increase participation in basic self-care and decrease assistance level. (MET)     *Caregiver will demonstrate good body mechanics and safe technique when assisting pt during functional ADLs/transfers to maximize safety upon DC.     Sayra Tomas, MINI, OTR/L  [place] License [id number]  [place] License [id number]

## 2024-10-09 NOTE — PROGRESS NOTES
Progress Note - Acute Pain   Name: Noris Heck 69 y.o. female I MRN: [id number]  Unit/Bed#: S -01 I Date of Admission: 10/7/2024   Date of Service: 10/9/2024 I Hospital Day: 1    Assessment & Plan  S/P total knee arthroplasty, left  Patient with history of left knee DJD who failed conservative treatment  Underwent elective left total knee arthroplasty on 10/7/2024 with orthopedic surgery  Received left-sided single shot adductor canal block with Exparel for postoperative analgesia  Denies any residual sensory/motor deficits secondary to block.  She is pending DC today which is okay to do from APS standpoint, recs on dc meds provided below    Multimodal analgesia:  Tylenol 975 mg every 8 hours scheduled  Robaxin 750 mg every 6 hours scheduled for muscle spasm/tightness  Gabapentin 100 mg daily at bedtime  Will hold off on treatment with NSAIDs given prior reaction to naproxen  Oxycodone 5 mg every 4 hours as needed for moderate pain  Oxycodone 10 mg every 4 hours as needed for severe pain  IV Dilaudid to stop now in anticipation for dc later today  Narcan as needed for respiratory depression/opioid reversal  Continue bowel regimen to prevent opioid-induced constipation      APS will sign off at this time. Thank you for the consult. All opioids and other analgesics to be written at discretion of primary team. Please contact Acute Pain Service - via SecureChat from [phone] with additional questions or concerns. See SecureChat or Blip for additional contacts and after hours information.    Subjective   Noris Heck is a 69 y.o. year old female with past medical history significant for arthritis, DM 2, HLD, HTN, obesity who presented and underwent an elective left total knee arthroplasty with orthopedic surgery on 10/7/2024.  Patient has history of DJD and failed conservative treatment measures.  Patient received left-sided single shot adductor canal block with Exparel for postoperative analgesia.  APS  was consulted for postoperative block follow-up and recommendations and management of postoperative pain.     Pain History  Current pain location(s):  Pain Score: 8  Pain Location/Orientation: Orientation: Left, Location: Knee  Pain Scale: Pain Assessment Tool: 0-10  24 hour history: Patient is sitting up in chair, NAD.  She states her pain is better controlled than it was yesterday since adjustments were made to her pain regimen.  She was able to ambulate with therapy and did better today.  She is passing gas but no bowel movement.  States that she has not had a bowel movement in several days as she did not go even prior to surgery.    Opioid requirement previous 24 hours: P.o. oxycodone 50 mg, IV Dilaudid 0.5 mg  Meds/Allergies   all current active meds have been reviewed, current meds:   Current Facility-Administered Medications:     acetaminophen (TYLENOL) tablet 975 mg, Q8H NANDO    ascorbic acid (VITAMIN C) tablet 500 mg, BID    calcium carbonate (TUMS) chewable tablet 1,000 mg, Daily PRN    docusate sodium (COLACE) capsule 100 mg, BID    enoxaparin (LOVENOX) subcutaneous injection 40 mg, Daily    folic acid (FOLVITE) tablet 1 mg, Daily    gabapentin (NEURONTIN) capsule 100 mg, HS    insulin lispro (HumALOG/ADMELOG) 100 units/mL subcutaneous injection 1-5 Units, HS    insulin lispro (HumALOG/ADMELOG) 100 units/mL subcutaneous injection 1-6 Units, TID AC **AND** Fingerstick Glucose (POCT), TID AC    methocarbamol (ROBAXIN) tablet 750 mg, Q6H NANDO    naloxone (NARCAN) 0.04 mg/mL syringe 0.04 mg, Q1MIN PRN    ondansetron (ZOFRAN) injection 4 mg, Q6H PRN    oxyCODONE (ROXICODONE) immediate release tablet 10 mg, Q4H PRN    oxyCODONE (ROXICODONE) IR tablet 5 mg, Q4H PRN    pravastatin (PRAVACHOL) tablet 80 mg, Daily With Dinner    senna (SENOKOT) tablet 8.6 mg, Daily, and PTA meds:   Prior to Admission Medications   Prescriptions Last Dose Informant Patient Reported? Taking?   Cholecalciferol 25 MCG (1000 UT) tablet  Past Month  Yes Yes   Sig: Take 1,000 Units by mouth   Multiple Vitamins-Minerals (PRESERVISION AREDS PO) More than a month  Yes No   Sig: Take 1 tablet by mouth 2 (two) times a day   Multiple Vitamins-Minerals (multivitamin with minerals) tablet More than a month  No No   Sig: TAKE 1 TABLET BY MOUTH EVERY DAY   Ozempic, 0.25 or 0.5 MG/DOSE, 2 MG/3ML injection pen Past Month  Yes Yes   Sig: Inject 0.5 mg under the skin every 7 days Mondays   Ozempic, 1 MG/DOSE, 4 MG/3ML injection pen Past Month  Yes Yes   Sig: Inject 1 mg under the skin every 7 days   amLODIPine (NORVASC) 5 mg tablet 10/6/2024  Yes Yes   Sig: Take 5 mg by mouth 2 (two) times a day   ascorbic acid (VITAMIN C) 500 MG tablet 10/6/2024  No Yes   Sig: Take 1 tablet (500 mg total) by mouth 2 (two) times a day   celecoxib (CeleBREX) 200 mg capsule Past Month  No Yes   Sig: TAKE 1 CAPSULE BY MOUTH EVERY DAY   Patient taking differently: Take 200 mg by mouth daily as needed   clotrimazole-betamethasone (LOTRISONE) 1-0.05 % cream More than a month  No No   Sig: Apply topically 2 (two) times a day   Patient taking differently: Apply topically if needed   ferrous sulfate 324 (65 Fe) mg 10/6/2024  No Yes   Sig: Take 1 tablet (324 mg total) by mouth 2 (two) times a day before meals   folic acid (FOLVITE) 1 mg tablet 10/6/2024  No Yes   Sig: Take 1 tablet (1 mg total) by mouth daily   metFORMIN (GLUCOPHAGE) 1000 MG tablet 10/6/2024  Yes Yes   Sig: Take 1 tablet by mouth 2 (two) times a day with meals   rosuvastatin (CRESTOR) 10 MG tablet Past Week  Yes Yes   Sig: Take 1 tablet by mouth daily at bedtime   valsartan-hydrochlorothiazide (DIOVAN-HCT) 320-12.5 MG per tablet 10/6/2024  Yes Yes   Sig: Take 1 tablet by mouth daily      Facility-Administered Medications: None     Allergies   Allergen Reactions    Naproxen Other (See Comments)     angioedema       Objective :  Temp:  [98 °F (36.7 °C)-99 °F (37.2 °C)] 98 °F (36.7 °C)  HR:  [] 107  BP: (102128)/(61-45)  128/73  Resp:  [15-16] 16  SpO2:  [90 %-95 %] 95 %    Physical Exam  Constitutional:       General: She is awake.      Appearance: She is well-developed.   HENT:      Head: Normocephalic and atraumatic.   Eyes:      Conjunctiva/sclera: Conjunctivae normal.   Cardiovascular:      Rate and Rhythm: Normal rate.   Pulmonary:      Effort: Pulmonary effort is normal. No respiratory distress.   Musculoskeletal:         General: Normal range of motion.      Comments: Left knee with Ace wrap in place, able to wiggle toes.   Skin:     General: Skin is warm.   Neurological:      Mental Status: She is alert and oriented to person, place, and time.   Psychiatric:         Attention and Perception: Attention and perception normal.         Mood and Affect: Mood and affect normal.         Speech: Speech normal.         Behavior: Behavior normal. Behavior is cooperative.         Thought Content: Thought content normal.         Cognition and Memory: Cognition and memory normal.         Judgment: Judgment normal.            Lab Results: I have reviewed the following results:  Estimated Creatinine Clearance: 99.4 mL/min (by C-G formula based on SCr of 0.6 mg/dL).  Lab Results   Component Value Date    WBC 12.27 (H) 10/09/2024    HGB 10.3 (L) 10/09/2024    HCT 31.9 (L) 10/09/2024     10/09/2024         Component Value Date/Time    K 4.3 10/09/2024 0553    K 4.6 09/12/2023 1031     10/09/2024 0553     09/12/2023 1031    CO2 28 10/09/2024 0553    CO2 27 09/12/2023 1031    BUN 11 10/09/2024 0553    BUN 18 09/12/2023 1031    CREATININE 0.60 10/09/2024 0553    CREATININE 0.56 09/12/2023 1031         Component Value Date/Time    CALCIUM 9.8 10/09/2024 0553    CALCIUM 11.1 (H) 05/29/2024 0832    ALKPHOS 51 09/12/2024 1105    ALKPHOS 57 09/12/2023 1031    AST 15 09/12/2024 1105    AST 16 09/12/2023 1031    ALT 12 09/12/2024 1105    ALT 14 09/12/2023 1031    TP 7.7 09/12/2024 1105    TP 7.6 09/12/2023 1031    ALB 4.4 09/12/2024  1105    ALB 4.3 09/12/2023 1031       Imaging Results Review: No pertinent imaging studies reviewed.  Other Study Results Review: No additional pertinent studies reviewed.

## 2024-10-09 NOTE — ASSESSMENT & PLAN NOTE
New onset, was in 90s yesterday, today 110s at rest & 120s with activity  EKG - sinus rhythm,   Attempted to give 500 cc bolus without much help, suggested better pain control but it seems like pain is better than before  Hence will proceed with further work up with CTA PE study & echo

## 2024-10-09 NOTE — ASSESSMENT & PLAN NOTE
Presentation: Patient presented to [facility] for total left knee arthroplasty secondary to failed conservative treatment of left knee DJD with varus deformity and limited flexion. Patient was admitted for continued pain control post operatively.  POD #1  Still having pain, APS consulted - scheduled tylenol & robaxin added, gabapentin HS added  Bowel regimen.

## 2024-10-09 NOTE — ASSESSMENT & PLAN NOTE
Patient with history of left knee DJD who failed conservative treatment  Underwent elective left total knee arthroplasty on 10/7/2024 with orthopedic surgery  Received left-sided single shot adductor canal block with Exparel for postoperative analgesia  Denies any residual sensory/motor deficits secondary to block.  She is pending DC today which is okay to do from APS standpoint, recs on dc meds provided below    Multimodal analgesia:  Tylenol 975 mg every 8 hours scheduled  Robaxin 750 mg every 6 hours scheduled for muscle spasm/tightness  Gabapentin 100 mg daily at bedtime  Will hold off on treatment with NSAIDs given prior reaction to naproxen  Oxycodone 5 mg every 4 hours as needed for moderate pain  Oxycodone 10 mg every 4 hours as needed for severe pain  IV Dilaudid to stop now in anticipation for dc later today  Narcan as needed for respiratory depression/opioid reversal  Continue bowel regimen to prevent opioid-induced constipation

## 2024-10-09 NOTE — PLAN OF CARE
Problem: PAIN - ADULT  Goal: Verbalizes/displays adequate comfort level or baseline comfort level  Description: Interventions:  - Encourage patient to monitor pain and request assistance  - Assess pain using appropriate pain scale  - Administer analgesics based on type and severity of pain and evaluate response  - Implement non-pharmacological measures as appropriate and evaluate response  - Consider cultural and social influences on pain and pain management  - Notify physician/advanced practitioner if interventions unsuccessful or patient reports new pain  Outcome: Progressing     Problem: INFECTION - ADULT  Goal: Absence or prevention of progression during hospitalization  Description: INTERVENTIONS:  - Assess and monitor for signs and symptoms of infection  - Monitor lab/diagnostic results  - Monitor all insertion sites, i.e. indwelling lines, tubes, and drains  - Monitor endotracheal if appropriate and nasal secretions for changes in amount and color  - Sandoval appropriate cooling/warming therapies per order  - Administer medications as ordered  - Instruct and encourage patient and family to use good hand hygiene technique  - Identify and instruct in appropriate isolation precautions for identified infection/condition  Outcome: Progressing     Problem: DISCHARGE PLANNING  Goal: Discharge to home or other facility with appropriate resources  Description: INTERVENTIONS:  - Identify barriers to discharge w/patient and caregiver  - Arrange for needed discharge resources and transportation as appropriate  - Identify discharge learning needs (meds, wound care, etc.)  - Arrange for interpretive services to assist at discharge as needed  - Refer to Case Management Department for coordinating discharge planning if the patient needs post-hospital services based on physician/advanced practitioner order or complex needs related to functional status, cognitive ability, or social support system  Outcome: Progressing

## 2024-10-09 NOTE — ASSESSMENT & PLAN NOTE
69 yoF s/p left total knee arthroplasty with Dr. Yepez on 10/07/2024.   Weight bearing as tolerated to the left lower extremity   PT/OT   Cleared for discharge home.   Lovenox while admitted   ASA 81 mg BID x 35 days on discharge   Appreciate recommendations for medical management per IM   Patient with sustained tachycardia today despite fluid bolus. Tachy in the 110s-120s.    EKG ordered per medicine   Discussed with medicine team in detail.   Will order CT chest PE protocol and echo for further evaluation.   Discussed with patient in detail at bedside.   Not yet medically cleared for discharge.   Appreciate medicine assistance.   Follow up with Dr. Yepez in 2 weeks

## 2024-10-09 NOTE — TELEPHONE ENCOUNTER
Caller: patients  rom     Doctor: Lucho    Reason for call: patient in a lot of pain cannot start PT yet because of her pain level, requesting a different pain medication or higher dosage, feels patient should stay admitted longer    Call back#: [phone]    
Please see message above regarding pain    S/P total knee arthroplasty, left   10/7   
Spoke to patients , greater than 10 minutes spent with patients  on the phone.     We discussed postoperative pain, management and IP medication orders.    Reviewed patients current Encounter in EPIC and reviewed inpatient Medication orders. Advised them to call the IP nurse via call bell to discuss current pain mgmt.     I also reviewed patients IP PT session notes today, and discussed that with patients spouse, and how she as ambulating and did steps.     Spouse also said he followed up with pharmacy, and was aware medications were not yet sent. We discussed that would be done before DC.   
No

## 2024-10-09 NOTE — PHYSICAL THERAPY NOTE
PHYSICAL THERAPY NOTE          Patient Name: Noris Heck  Today's Date: 10/9/2024           10/09/24 0846   PT Last Visit   PT Visit Date 10/09/24   Note Type   Note Type Treatment   Pain Assessment   Pain Assessment Tool 0-10   Pain Score 7   Pain Location/Orientation Orientation: Left;Location: Neck   Effect of Pain on Daily Activities limits ambulation distance and knee ROM   Patient's Stated Pain Goal No pain   Hospital Pain Intervention(s) Repositioned;Ambulation/increased activity;Rest;Cold applied   Multiple Pain Sites No   Restrictions/Precautions   Weight Bearing Precautions Per Order Yes   LLE Weight Bearing Per Order WBAT  (s/p elective L TKA 10/7 Dr Yepez)   Other Precautions Chair Alarm;Bed Alarm;WBS;Fall Risk;Pain   General   Chart Reviewed Yes   Response to Previous Treatment Patient with no complaints from previous session.   Family/Caregiver Present No   Cognition   Overall Cognitive Status WFL   Arousal/Participation Alert;Responsive;Cooperative   Attention Within functional limits   Orientation Level Oriented X4   Memory Within functional limits   Following Commands Follows multistep commands with increased time or repetition   Comments pt was pleasant and cooperative throughout PT intervention   Subjective   Subjective pt stated 7/10 pain pre/post tx session L knee   Bed Mobility   Additional Comments pt seated OOB in the recliner pre/post tx session   Transfers   Sit to Stand 4  Minimal assistance  (Initial STS required required min ax1 but all other transfers pt completed with /s and good recall on hand placement while ascending to RW and descending to recliner/raised toilet seat)   Additional items Assist x 1;Armrests;Increased time required;Verbal cues   Stand to Sit 5  Supervision   Additional items Assist x 1;Armrests;Increased time required;Verbal cues   Stand pivot Unable to assess   Additional  Comments pt required min ax1 for initial STS but was /s for all other funcitonal transfers in todays tx session to and from RW   Ambulation/Elevation   Gait pattern Decreased foot clearance;Decreased L stance;Antalgic;Step to;Excessively slow  (pt required Vc's for RW management as pt continued to walk to far in RW and required education on MARILEE)   Gait Assistance 5  Supervision   Additional items Assist x 1;Verbal cues   Assistive Device Rolling walker   Distance 30'x1 RW 15'x2 RW   Stair Management Assistance 5  Supervision   Additional items Assist x 1;Verbal cues;Increased time required   Stair Management Technique Two rails;Step to pattern;Foreward;Backward   Number of Stairs 2   Ambulation/Elevation Additional Comments pt was able to complete multiple functional transfers to and from RW, ambulation with RW and stair trials w/ bilateral hand rails all with /s, no LOB   Balance   Static Sitting Good   Dynamic Sitting Fair   Static Standing Fair -   Dynamic Standing Poor +   Ambulatory Fair -  (w/ RW)   Endurance Deficit   Endurance Deficit Yes   Endurance Deficit Description limited activity tolerance and ambulation distance as pt did require several seated therapeutic rest breaks in [facility] tx session   Activity Tolerance   Activity Tolerance Patient limited by pain   Nurse Made Aware Spoke to RN   Exercises   Quad Sets Sitting;10 reps;AROM;Left  (long sit position)   Hip Abduction Sitting;10 reps;AAROM;AROM;Left  (long sit position)   Hip Adduction Sitting;10 reps;AAROM;AROM;Left  (long sit position)   Knee AROM Long Arc Quad Sitting;10 reps;AROM;Left   Ankle Pumps Sitting;20 reps;AROM;Bilateral   Assessment   Prognosis Fair   Problem List Decreased strength;Decreased range of motion;Decreased endurance;Impaired balance;Decreased mobility;Decreased skin integrity;Obesity;Pain;Orthopedic restrictions   Assessment pt began tx session seated OOB in the recliner as pt was agreeable to participate in PT intervention.  Pt to focus on transfer training, TE activities, posture/balance with gait and stair trials. In comparison to previous tx sessions progress was noted with skilled PT intervention as pt initially required min ax1 for STS from recliner to RW but progress to /s for all other functional transfers to and from RW, w/o LOB and good recall on hand placement for increased safety and balance. pt was able to increase ambulation distance to 30'x1 RW 15'x2 RW with close /s, no LOB. pt was educated with verbal/visual demonstration on all safe stair trial strategies prior to inititing steps. pt completed 2 steps with bilateral hand rails and close /s, no LOB. pt also participated in TE activities while seated in the recliner with AROM and no increases in pain. Post tx pt in the recliner with call bell, chair alarm activated and all pt needs met. pt would benefit from continued skilled PT intervention in order to increase activity tolerance and ambulation distance.   Barriers to Discharge Inaccessible home environment;Decreased caregiver support   Goals   Patient Goals to have less pain   STG Expiration Date 10/17/24   PT Treatment Day 3   Plan   Treatment/Interventions Functional transfer training;LE strengthening/ROM;Therapeutic exercise;Elevations;Endurance training;Patient/family training;Equipment eval/education;Bed mobility;Gait training;Spoke to nursing;OT;Spoke to advanced practitioner;Cognitive reorientation   Progress Slow progress, decreased activity tolerance   PT Frequency Twice a day   Discharge Recommendation   Rehab Resource Intensity Level, PT III (Minimum Resource Intensity)   Equipment Recommended Walker   Walker Package Recommended Wheeled walker   AM-PAC Basic Mobility Inpatient   Turning in Flat Bed Without Bedrails 3   Lying on Back to Sitting on Edge of Flat Bed Without Bedrails 3   Moving Bed to Chair 3   Standing Up From Chair Using Arms 3   Walk in Room 3   Climb 3-5 Stairs With Railing 3   Basic Mobility  Inpatient Raw Score 18   Basic Mobility Standardized Score 41.05   [facility] Level Of Mobility   [facility] Goal 6: Walk 10 steps or more   Education   Education Provided Mobility training;Assistive device   Patient Demonstrates acceptance/verbal understanding   End of Consult   Patient Position at End of Consult Bedside chair;Bed/Chair alarm activated;All needs within reach   The patient's AM-PAC Basic Mobility Inpatient Short Form Raw Score is 18. A Raw score of greater than 16 suggests the patient may benefit from discharge to home. Please also refer to the recommendation of the Physical Therapist for safe discharge planning.    Declan Lehman

## 2024-10-09 NOTE — PROGRESS NOTES
Progress Note - Orthopedics   Name: Noris Heck 69 y.o. female I MRN: [id number]  Unit/Bed#: S -01 I Date of Admission: 10/7/2024   Date of Service: 10/9/2024 I Hospital Day: 1    Assessment & Plan  S/P total knee arthroplasty, left  69 yoF s/p left total knee arthroplasty with Dr. Yepez on 10/07/2024.   Weight bearing as tolerated to the left lower extremity   PT/OT   Cleared for discharge home.   Lovenox while admitted   ASA 81 mg BID x 35 days on discharge   Appreciate recommendations for medical management per IM   Patient with sustained tachycardia today despite fluid bolus. Tachy in the 110s-120s.    EKG ordered per medicine   Discussed with medicine team in detail.   Will order CT chest PE protocol and echo for further evaluation.   Discussed with patient in detail at bedside.   Not yet medically cleared for discharge.   Appreciate medicine assistance.   Follow up with Dr. Yepez in 2 weeks     Orthopedics service will follow.  Dispo pending medical clearance.   Please contact the SecureChat role for the Orthopedics service with any questions/concerns.    Subjective   69 y.o.female seen and examined at bedside. She reports improvement of pain overnight. Feels current pain regimen is helping significantly. Was able to work with PT/OT today. Eager to go home when able.     Objective :  Temp:  [98 °F (36.7 °C)-99 °F (37.2 °C)] 98 °F (36.7 °C)  HR:  [] 111  BP: (102-128)/(62-74) 127/74  Resp:  [15-16] 16  SpO2:  [90 %-95 %] 94 %    Physical Exam  Musculoskeletal: leftlower  Surgical dressings c/d/I.   Ice packs in place.   Motor intact to +FHL/EHL, +ankle dorsi/plantar flexion  Sensation intact to saphenous, sural, tibial, superficial peroneal nerve, and deep peroneal  2+ DP pulse  No calf swelling or tenderness to palpation      Lab Results: I have reviewed the following results:  Recent Labs     10/08/24  0602 10/09/24  0552 10/09/24  0553   WBC 12.95* 12.27*  --    HGB 10.0* 10.3*  --   "  HCT 33.7* 31.9*  --     239  --    BUN 13  --  11   CREATININE 0.70  --  0.60     Blood Culture:  No results found for: \"BLOODCX\"  Wound Culture: No results found for: \"WOUNDCULT\"  "

## 2024-10-09 NOTE — PLAN OF CARE
Problem: OCCUPATIONAL THERAPY ADULT  Goal: Performs self-care activities at highest level of function for planned discharge setting.  See evaluation for individualized goals.  Description: Treatment Interventions: ADL retraining, Functional transfer training, Endurance training, Patient/family training, Equipment evaluation/education, Compensatory technique education, Continued evaluation, Energy conservation  Equipment Recommended: Bedside commode       See flowsheet documentation for full assessment, interventions and recommendations.   Outcome: Progressing  Note: Limitation: Decreased ADL status, Decreased Safe judgement during ADL, Decreased endurance, Decreased self-care trans, Decreased high-level ADLs (pain, balance, fxnl mobility, act john, fxnl reach, standing john, strength, and fxnl sitting john)  Prognosis: Good  Assessment: Pt seen for OT treatment on 10/9 s/p admit to [facility] w/ S/P total knee arthroplasty, left. Pt agreeable to OT treatment session, upon arrival patient was found seated OOB to Recliner. Patient participated in skilled OT interventions to address ADL tasks, functional transfers, and overall activity tolerance and participation. In comparison to previous session, Noris demonstrated improvements in safety/compensatory techniques for ADLs/mobility, pain control and performance of ADLs to S level but is continuing to perform below baseline due to limited activity tolerance, dizziness during activity and pain as well as impaired standing tolerance, weight bearing through LLE and speed of ADLs/mobility. Pt does have adequate social support from spouse at home for light assist w/ ADLs/mobility and supervision for safety. At this time it is felt that pt would be benefit from level III intensity resources upon DC and modification of activity including use of BSC during day to manage symptoms. Pt verbalized understanding. Personal factors that continue to impact DC include: inaccessibility to  community, difficulty completing ADLs, and difficulty completing IADLs. Patient to benefit from continued IPOT treatment to address deficits as defined above and maximize level of functional independence with ADLs and functional mobility. Goals remain appropriate. Continue per POC.     Rehab Resource Intensity Level, OT: III (Minimum Resource Intensity) (increased social support from spouse for ADLs/IADLs)     MINI Fuller, OTR/L  [place] License [id number]  [place] License [id number]

## 2024-10-09 NOTE — CASE MANAGEMENT
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  (HCC) 2024     Type 2 diabetes mellitus, without long-term current use of insulin (HCC) 2023     Hyperlipidemia, unspecified 2021     Primary hypertension 2021       LOS (days): 1  Geometric Mean LOS (GMLOS) (days): 1.7  Days to GMLOS:0.6     OBJECTIVE:  Risk of Unplanned Readmission Score: 7.9         Current admission status: Inpatient   Preferred Pharmacy:   [id number] - [address] [address]  Phone: [phone] Fax: [phone]    Primary Care Provider: Maykel Lopez MD    Primary Insurance: [facility]  Secondary Insurance:     DISCHARGE DETAILS:    Discharge planning discussed with:: Patient and Patient's  Gerson  Freedom of Choice: Yes  Comments - Freedom of Choice: CM met bedside with patient and patient's  Gerson to discuss rehab recommendations. Patient/Gerson consented to have HHC referrals sent.  CM contacted family/caregiver?: Yes  Were Treatment Team discharge recommendations reviewed with patient/caregiver?: Yes  Did patient/caregiver verbalize understanding of patient care needs?: Yes       Contacts  Patient Contacts:  Gerson  Relationship to Patient:: Family  Contact Method: In Person  Reason/Outcome: Continuity of Care, Discharge Planning    Requested Home Health Care         Is the patient interested in HHC at discharge?: Yes  Home Health Discipline requested::  Occupational Therapy, Physical Therapy  Home Health Agency Name:: Other (TBD-referrals sent)  HHA External Referral Reason (only applicable if external HHA name selected): Services not provided in network or near patient location  Home Health Follow-Up Provider:: PCP  Home Health Services Needed:: Strengthening/Theraputic Exercises to Improve Function, Gait/ADL Training, Evaluate Functional Status and Safety  Homebound Criteria Met:: Requires the Assistance of Another Person for Safe Ambulation or to Leave the Home  Supporting Clincal Findings:: Fatigues Easliy in Short Distances, Limited Endurance    Other Referral/Resources/Interventions Provided:  Referral Comments: CM sent blanket referrals in [place] for  HHC for PT/OT.      Treatment Team Recommendation: Home with Home Health Care  Discharge Destination Plan:: Home with Home Health Care

## 2024-10-09 NOTE — PROGRESS NOTES
Progress Note - Hospitalist   Name: Noris Heck 69 y.o. female I MRN: [id number]  Unit/Bed#: S -01 I Date of Admission: 10/7/2024   Date of Service: 10/9/2024 I Hospital Day: 1    Assessment & Plan  S/P total knee arthroplasty, left  Presentation: Patient presented to [facility] for total left knee arthroplasty secondary to failed conservative treatment of left knee DJD with varus deformity and limited flexion. Patient was admitted for continued pain control post operatively.  POD #1  Still having pain, APS consulted - scheduled tylenol & robaxin added, gabapentin HS added  Bowel regimen.  Sinus tachycardia  New onset, was in 90s yesterday, today 110s at rest & 120s with activity  EKG - sinus rhythm,   Attempted to give 500 cc bolus without much help, suggested better pain control but it seems like pain is better than before  Hence will proceed with further work up with CTA PE study & echo  Obesity (BMI 35.0-39.9 without comorbidity)  Encouraged lifestyle modifications.  Type 2 diabetes mellitus, without long-term current use of insulin ([facility])    Lab Results   Component Value Date    HGBA1C 6.2 (H) 09/12/2024   Home regimen of Metformin and Ozempic, will hold while inpatient.  Accu checks and SSI.  Hypoglycemia protocol.  Hyperlipidemia, unspecified  Continue statin.  Primary hypertension  Blood pressure is reviewed and acceptable.  Holding off on home medications of Amlodipine, ARB and HCTZ  Monitor blood pressure.    VTE Pharmacologic Prophylaxis: VTE Score: 3 Moderate Risk (Score 3-4) - Pharmacological DVT Prophylaxis Ordered: enoxaparin (Lovenox).    Mobility:   Basic Mobility Inpatient Raw Score: 16  JH-HLM Goal: 5: Stand one or more mins  JH-HLM Achieved: 4: Move to chair/commode  JH-HLM Goal achieved. Continue to encourage appropriate mobility.    Patient Centered Rounds: I performed bedside rounds with nursing staff today.   Discussions with Specialists or Other Care Team Provider: primary  team    Education and Discussions with Family / Patient: Updated  () at bedside.    Current Length of Stay: 1 day(s)  Current Patient Status: Inpatient   Certification Statement: The patient will continue to require additional inpatient hospital stay due to tachycardia work up  Discharge Plan: [facility] is following this patient on consult. They are not yet medically stable for discharge secondary to work up.    Code Status: Level 1 - Full Code    Subjective   Had BM today, pain is there, little worse today after working more with PT, no anxiety, eating drinking well    Objective :  Temp:  [98 °F (36.7 °C)-98.4 °F (36.9 °C)] 98 °F (36.7 °C)  HR:  [] 102  BP: (102-129)/(62-74) 129/64  Resp:  [15-16] 16  SpO2:  [91 %-95 %] 95 %    Body mass index is 38.22 kg/m².     Input and Output Summary (last 24 hours):     Intake/Output Summary (Last 24 hours) at 10/9/2024 1634  Last data filed at 10/9/2024 1403  Gross per 24 hour   Intake 680 ml   Output 601 ml   Net 79 ml       Physical Exam  Vitals reviewed.   HENT:      Head: Normocephalic and atraumatic.   Cardiovascular:      Rate and Rhythm: Normal rate and regular rhythm.   Pulmonary:      Effort: Pulmonary effort is normal.      Breath sounds: Normal breath sounds.   Abdominal:      General: There is no distension.      Palpations: Abdomen is soft.   Musculoskeletal:      Right lower leg: No edema.      Left lower leg: No edema.   Neurological:      Mental Status: She is alert and oriented to person, place, and time.           Lines/Drains:              Lab Results: I have reviewed the following results:   Results from last 7 days   Lab Units 10/09/24  0552   WBC Thousand/uL 12.27*   HEMOGLOBIN g/dL 10.3*   HEMATOCRIT % 31.9*   PLATELETS Thousands/uL 239     Results from last 7 days   Lab Units 10/09/24  0553   SODIUM mmol/L 135   POTASSIUM mmol/L 4.3   CHLORIDE mmol/L 102   CO2 mmol/L 28   BUN mg/dL 11   CREATININE mg/dL 0.60   ANION GAP mmol/L 5    CALCIUM mg/dL 9.8   GLUCOSE RANDOM mg/dL 117         Results from last 7 days   Lab Units 10/09/24  1629 10/09/24  1059 10/09/24  0706 10/08/24  2120 10/08/24  1735 10/08/24  1048 10/08/24  0708 10/08/24  0004 10/07/24  1215 10/07/24  0827   POC GLUCOSE mg/dl 117 141* 144* 136 182* 161* 124 131 125 105               Recent Cultures (last 7 days):         Imaging Results Review: No pertinent imaging studies reviewed.  Other Study Results Review: EKG was personally reviewed and my interpretation is: Sinus Tachycardia. ..    Last 24 Hours Medication List:     Current Facility-Administered Medications:     acetaminophen (TYLENOL) tablet 975 mg, Q8H NANDO    ascorbic acid (VITAMIN C) tablet 500 mg, BID    bisacodyl (DULCOLAX) rectal suppository 10 mg, Daily PRN    calcium carbonate (TUMS) chewable tablet 1,000 mg, Daily PRN    docusate sodium (COLACE) capsule 100 mg, BID    enoxaparin (LOVENOX) subcutaneous injection 40 mg, Daily    folic acid (FOLVITE) tablet 1 mg, Daily    gabapentin (NEURONTIN) capsule 100 mg, HS    insulin lispro (HumALOG/ADMELOG) 100 units/mL subcutaneous injection 1-5 Units, HS    insulin lispro (HumALOG/ADMELOG) 100 units/mL subcutaneous injection 1-6 Units, TID AC **AND** Fingerstick Glucose (POCT), TID AC    methocarbamol (ROBAXIN) tablet 750 mg, Q6H NANDO    naloxone (NARCAN) 0.04 mg/mL syringe 0.04 mg, Q1MIN PRN    ondansetron (ZOFRAN) injection 4 mg, Q6H PRN    oxyCODONE (ROXICODONE) immediate release tablet 10 mg, Q4H PRN    oxyCODONE (ROXICODONE) IR tablet 5 mg, Q4H PRN    polyethylene glycol (MIRALAX) packet 17 g, Daily    pravastatin (PRAVACHOL) tablet 80 mg, Daily With Dinner    senna (SENOKOT) tablet 8.6 mg, Daily    Administrative Statements   Today, Patient Was Seen By: Radhika Jc MD  I have spent a total time of 45 minutes in caring for this patient on the day of the visit/encounter including Patient and family education, Documenting in the medical record, Reviewing /  ordering tests, medicine, procedures  , Obtaining or reviewing history  , and Communicating with other healthcare professionals .    **Please Note: This note may have been constructed using a voice recognition system.**

## 2024-10-10 ENCOUNTER — HOME HEALTH ADMISSION (OUTPATIENT)
Dept: HOME HEALTH SERVICES | Facility: HOME HEALTHCARE | Age: 69
End: 2024-10-10
Payer: COMMERCIAL

## 2024-10-10 ENCOUNTER — APPOINTMENT (INPATIENT)
Dept: NON INVASIVE DIAGNOSTICS | Facility: HOSPITAL | Age: 69
DRG: 470 | End: 2024-10-10
Payer: COMMERCIAL

## 2024-10-10 ENCOUNTER — HOME CARE VISIT (OUTPATIENT)
Dept: HOME HEALTH SERVICES | Facility: HOME HEALTHCARE | Age: 69
End: 2024-10-10

## 2024-10-10 ENCOUNTER — APPOINTMENT (OUTPATIENT)
Dept: PHYSICAL THERAPY | Facility: CLINIC | Age: 69
End: 2024-10-10
Payer: COMMERCIAL

## 2024-10-10 VITALS
HEART RATE: 86 BPM | OXYGEN SATURATION: 94 % | RESPIRATION RATE: 17 BRPM | HEIGHT: 64 IN | WEIGHT: 220 LBS | TEMPERATURE: 98.6 F | SYSTOLIC BLOOD PRESSURE: 122 MMHG | BODY MASS INDEX: 37.56 KG/M2 | DIASTOLIC BLOOD PRESSURE: 60 MMHG

## 2024-10-10 LAB
ANION GAP SERPL CALCULATED.3IONS-SCNC: 5 MMOL/L (ref 4–13)
AORTIC ROOT: 2.8 CM
AORTIC VALVE MEAN VELOCITY: 13.2 M/S
APICAL FOUR CHAMBER EJECTION FRACTION: 61 %
ASCENDING AORTA: 3.1 CM
ATRIAL RATE: 111 BPM
AV AREA BY CONTINUOUS VTI: 1.1 CM2
AV AREA PEAK VELOCITY: 1.2 CM2
AV LVOT MEAN GRADIENT: 1 MMHG
AV LVOT PEAK GRADIENT: 2 MMHG
AV MEAN GRADIENT: 8 MMHG
AV PEAK GRADIENT: 18 MMHG
AV VALVE AREA: 1.08 CM2
AV VELOCITY RATIO: 0.33
BSA FOR ECHO PROCEDURE: 2.04 M2
BUN SERPL-MCNC: 8 MG/DL (ref 5–25)
CALCIUM SERPL-MCNC: 9.9 MG/DL (ref 8.4–10.2)
CHLORIDE SERPL-SCNC: 102 MMOL/L (ref 96–108)
CO2 SERPL-SCNC: 30 MMOL/L (ref 21–32)
CREAT SERPL-MCNC: 0.56 MG/DL (ref 0.6–1.3)
DME PARACHUTE DELIVERY DATE ACTUAL: NORMAL
DME PARACHUTE DELIVERY DATE REQUESTED: NORMAL
DME PARACHUTE ITEM DESCRIPTION: NORMAL
DME PARACHUTE ORDER STATUS: NORMAL
DME PARACHUTE SUPPLIER NAME: NORMAL
DME PARACHUTE SUPPLIER PHONE: NORMAL
DOP CALC AO PEAK VEL: 2.14 M/S
DOP CALC AO VTI: 39.11 CM
DOP CALC LVOT AREA: 3.8 CM2
DOP CALC LVOT CARDIAC INDEX: 1.58 L/MIN/M2
DOP CALC LVOT CARDIAC OUTPUT: 3.21 L/MIN
DOP CALC LVOT DIAMETER: 2.2 CM
DOP CALC LVOT PEAK VEL VTI: 11.16 CM
DOP CALC LVOT PEAK VEL: 0.7 M/S
DOP CALC LVOT STROKE INDEX: 20.1 ML/M2
DOP CALC LVOT STROKE VOLUME: 42.4
E WAVE DECELERATION TIME: 107 MS
E/A RATIO: 0.96
ERYTHROCYTE [DISTWIDTH] IN BLOOD BY AUTOMATED COUNT: 14.4 % (ref 11.6–15.1)
FRACTIONAL SHORTENING: 33 (ref 28–44)
GFR SERPL CREATININE-BSD FRML MDRD: 94 ML/MIN/1.73SQ M
GLUCOSE SERPL-MCNC: 100 MG/DL (ref 65–140)
GLUCOSE SERPL-MCNC: 100 MG/DL (ref 65–140)
GLUCOSE SERPL-MCNC: 122 MG/DL (ref 65–140)
HCT VFR BLD AUTO: 29.1 % (ref 34.8–46.1)
HGB BLD-MCNC: 9.2 G/DL (ref 11.5–15.4)
INTERVENTRICULAR SEPTUM IN DIASTOLE (PARASTERNAL SHORT AXIS VIEW): 1 CM
INTERVENTRICULAR SEPTUM: 1 CM (ref 0.6–1.1)
LEFT ATRIUM SIZE: 2.9 CM
LEFT INTERNAL DIMENSION IN SYSTOLE: 2.4 CM (ref 2.1–4)
LEFT VENTRICULAR INTERNAL DIMENSION IN DIASTOLE: 3.6 CM (ref 3.5–6)
LEFT VENTRICULAR POSTERIOR WALL IN END DIASTOLE: 0.8 CM
LEFT VENTRICULAR STROKE VOLUME: 34 ML
LVSV (TEICH): 34 ML
MCH RBC QN AUTO: 29.3 PG (ref 26.8–34.3)
MCHC RBC AUTO-ENTMCNC: 31.6 G/DL (ref 31.4–37.4)
MCV RBC AUTO: 93 FL (ref 82–98)
MV E'TISSUE VEL-SEP: 4 CM/S
MV PEAK A VEL: 0.77 M/S
MV PEAK E VEL: 74 CM/S
MV STENOSIS PRESSURE HALF TIME: 31 MS
MV VALVE AREA P 1/2 METHOD: 7.1
P AXIS: 36 DEGREES
PLATELET # BLD AUTO: 229 THOUSANDS/UL (ref 149–390)
PMV BLD AUTO: 10.7 FL (ref 8.9–12.7)
POTASSIUM SERPL-SCNC: 3.7 MMOL/L (ref 3.5–5.3)
PR INTERVAL: 188 MS
QRS AXIS: 149 DEGREES
QRSD INTERVAL: 96 MS
QT INTERVAL: 312 MS
QTC INTERVAL: 424 MS
RA PRESSURE ESTIMATED: 8 MMHG
RBC # BLD AUTO: 3.14 MILLION/UL (ref 3.81–5.12)
RIGHT ATRIUM AREA SYSTOLE A4C: 17.2 CM2
RIGHT VENTRICLE ID DIMENSION: 4 CM
RV PSP: 44 MMHG
SINOTUBULAR JUNCTION: 2.4 CM
SL CV LV EF: 60
SL CV PED ECHO LEFT VENTRICLE DIASTOLIC VOLUME (MOD BIPLANE) 2D: 54 ML
SL CV PED ECHO LEFT VENTRICLE SYSTOLIC VOLUME (MOD BIPLANE) 2D: 19 ML
SL CV SINUS OF VALSALVA 2D: 2.8 CM
SODIUM SERPL-SCNC: 137 MMOL/L (ref 135–147)
STJ: 2.4 CM
T WAVE AXIS: 17 DEGREES
TR MAX PG: 36 MMHG
TR PEAK VELOCITY: 3 M/S
TRICUSPID ANNULAR PLANE SYSTOLIC EXCURSION: 2.2 CM
TRICUSPID VALVE PEAK REGURGITATION VELOCITY: 3 M/S
VENTRICULAR RATE: 111 BPM
WBC # BLD AUTO: 10.21 THOUSAND/UL (ref 4.31–10.16)

## 2024-10-10 PROCEDURE — 99232 SBSQ HOSP IP/OBS MODERATE 35: CPT

## 2024-10-10 PROCEDURE — 97116 GAIT TRAINING THERAPY: CPT

## 2024-10-10 PROCEDURE — 93306 TTE W/DOPPLER COMPLETE: CPT

## 2024-10-10 PROCEDURE — 97110 THERAPEUTIC EXERCISES: CPT

## 2024-10-10 PROCEDURE — 93306 TTE W/DOPPLER COMPLETE: CPT | Performed by: INTERNAL MEDICINE

## 2024-10-10 PROCEDURE — NC001 PR NO CHARGE: Performed by: PHYSICIAN ASSISTANT

## 2024-10-10 PROCEDURE — 93010 ELECTROCARDIOGRAM REPORT: CPT | Performed by: INTERNAL MEDICINE

## 2024-10-10 PROCEDURE — 99024 POSTOP FOLLOW-UP VISIT: CPT | Performed by: PHYSICIAN ASSISTANT

## 2024-10-10 PROCEDURE — 97530 THERAPEUTIC ACTIVITIES: CPT

## 2024-10-10 PROCEDURE — 99232 SBSQ HOSP IP/OBS MODERATE 35: CPT | Performed by: HOSPITALIST

## 2024-10-10 PROCEDURE — 80048 BASIC METABOLIC PNL TOTAL CA: CPT | Performed by: PHYSICIAN ASSISTANT

## 2024-10-10 PROCEDURE — 85027 COMPLETE CBC AUTOMATED: CPT | Performed by: PHYSICIAN ASSISTANT

## 2024-10-10 PROCEDURE — 82948 REAGENT STRIP/BLOOD GLUCOSE: CPT

## 2024-10-10 RX ORDER — METHOCARBAMOL 750 MG/1
750 TABLET, FILM COATED ORAL EVERY 6 HOURS SCHEDULED
Qty: 28 TABLET | Refills: 0 | Status: SHIPPED | OUTPATIENT
Start: 2024-10-10 | End: 2024-10-17

## 2024-10-10 RX ORDER — GABAPENTIN 100 MG/1
100 CAPSULE ORAL
Qty: 30 CAPSULE | Refills: 0 | Status: SHIPPED | OUTPATIENT
Start: 2024-10-10 | End: 2024-11-09

## 2024-10-10 RX ADMIN — OXYCODONE HYDROCHLORIDE 10 MG: 5 TABLET ORAL at 08:19

## 2024-10-10 RX ADMIN — ACETAMINOPHEN 975 MG: 325 TABLET ORAL at 06:02

## 2024-10-10 RX ADMIN — ENOXAPARIN SODIUM 40 MG: 40 INJECTION SUBCUTANEOUS at 06:02

## 2024-10-10 RX ADMIN — METHOCARBAMOL TABLETS 750 MG: 750 TABLET, COATED ORAL at 00:14

## 2024-10-10 RX ADMIN — POLYETHYLENE GLYCOL 3350 17 G: 17 POWDER, FOR SOLUTION ORAL at 08:20

## 2024-10-10 RX ADMIN — SENNOSIDES 8.6 MG: 8.6 TABLET, FILM COATED ORAL at 08:19

## 2024-10-10 RX ADMIN — METHOCARBAMOL TABLETS 750 MG: 750 TABLET, COATED ORAL at 11:43

## 2024-10-10 RX ADMIN — ACETAMINOPHEN 975 MG: 325 TABLET ORAL at 14:10

## 2024-10-10 RX ADMIN — FOLIC ACID 1 MG: 1 TABLET ORAL at 08:19

## 2024-10-10 RX ADMIN — OXYCODONE HYDROCHLORIDE 10 MG: 5 TABLET ORAL at 14:10

## 2024-10-10 RX ADMIN — DOCUSATE SODIUM 100 MG: 100 CAPSULE, LIQUID FILLED ORAL at 08:19

## 2024-10-10 RX ADMIN — METHOCARBAMOL TABLETS 750 MG: 750 TABLET, COATED ORAL at 06:02

## 2024-10-10 RX ADMIN — OXYCODONE HYDROCHLORIDE AND ACETAMINOPHEN 500 MG: 500 TABLET ORAL at 08:20

## 2024-10-10 NOTE — ASSESSMENT & PLAN NOTE
New onset, was in 90s yesterday, today 110s at rest & 120s with activity  EKG - sinus rhythm,   Attempted to give 500 cc bolus without much help, suggested better pain control but it seems like pain is better than before  Hence underwent CTA PE study which was negative and check 2D echocardiogram which showed LVEF of 60%, mild to moderate AAS, normal RV function  Retzer back to normal today hence she is cleared for discharge

## 2024-10-10 NOTE — PLAN OF CARE
Problem: PAIN - ADULT  Goal: Verbalizes/displays adequate comfort level or baseline comfort level  Description: Interventions:  - Encourage patient to monitor pain and request assistance  - Assess pain using appropriate pain scale  - Administer analgesics based on type and severity of pain and evaluate response  - Implement non-pharmacological measures as appropriate and evaluate response  - Consider cultural and social influences on pain and pain management  - Notify physician/advanced practitioner if interventions unsuccessful or patient reports new pain  Outcome: Progressing     Problem: INFECTION - ADULT  Goal: Absence or prevention of progression during hospitalization  Description: INTERVENTIONS:  - Assess and monitor for signs and symptoms of infection  - Monitor lab/diagnostic results  - Monitor all insertion sites, i.e. indwelling lines, tubes, and drains  - Monitor endotracheal if appropriate and nasal secretions for changes in amount and color  - [place] appropriate cooling/warming therapies per order  - Administer medications as ordered  - Instruct and encourage patient and family to use good hand hygiene technique  - Identify and instruct in appropriate isolation precautions for identified infection/condition  Outcome: Progressing  Goal: Absence of fever/infection during neutropenic period  Description: INTERVENTIONS:  - Monitor WBC    Outcome: Progressing     Problem: DISCHARGE PLANNING  Goal: Discharge to home or other facility with appropriate resources  Description: INTERVENTIONS:  - Identify barriers to discharge w/patient and caregiver  - Arrange for needed discharge resources and transportation as appropriate  - Identify discharge learning needs (meds, wound care, etc.)  - Arrange for interpretive services to assist at discharge as needed  - Refer to Case Management Department for coordinating discharge planning if the patient needs post-hospital services based on physician/advanced  practitioner order or complex needs related to functional status, cognitive ability, or social support system  Outcome: Progressing     Problem: Knowledge Deficit  Goal: Patient/family/caregiver demonstrates understanding of disease process, treatment plan, medications, and discharge instructions  Description: Complete learning assessment and assess knowledge base.  Interventions:  - Provide teaching at level of understanding  - Provide teaching via preferred learning methods  Outcome: Progressing

## 2024-10-10 NOTE — DISCHARGE SUMMARY
Discharge Summary - Orthopedics   Name: Noris Heck 69 y.o. female I MRN: [id number]  Unit/Bed#: S -01 I Date of Admission: 10/7/2024   Date of Service: 10/10/2024 I Hospital Day: 2    Discharge Summary - Orthopedics   Noris Heck 69 y.o. female MRN: [id number]  Unit/Bed#: S -01    Attending Physician: Dr. Yepez    Admitting diagnosis: Primary osteoarthritis of left knee [M17.12]    Discharge diagnosis: Primary osteoarthritis of left knee [M17.12]    Date of admission: 10/7/2024    Date of discharge: 10/10/24    Procedure: left total knee arthroplasty     HPI: 69 y.o. female with a history of left knee DJD who has been seen by Dr. Yepez in clinic.  Pt has failed previous non operative therapies and was scheduled for left total knee arthroplasty.  Prior to surgery the risks and benefits of surgery were explained and informed consent was obtained.    Hospital course: Pt was taken to the OR on 10/7/2024.  Surgery went without complications and pt was discharged to the PACU in a stable condition and was transferred to the floor.  During admission she was seen by both acute pain service and medicine service. She was managed for sinus tachycardia and had echocardiogram and CT chest performed, both of which were unremarkable. On discharge date pt was cleared by PT and the medicine team and determined to be safe for discharge.  Daily discussion was had with the patient, nursing staff, orthopaedic team, and family members if present.  All questions were answered to the patients satisifaction.      0   Lab Value Date/Time    HGB 9.2 (L) 10/10/2024 0538    HGB 10.3 (L) 10/09/2024 0552    HGB 10.0 (L) 10/08/2024 0602    HGB 11.7 09/12/2024 1105       Greater than 2 gram decrease in Hb qualifies for diagnosis of acute blood loss anemia. Vital signs remained stable and pt was resuscitated with IVF as needed.     Discharge Instructions:   Weight bearing as tolerated to the left lower extremity.    Range of motion as tolerated.   Keep dressings clean and dry at all times   Complete DVT prophylaxis as prescribed   Physical therapy  Body mass index is 37.76 kg/m². morbidly obese. Recommend behavior modifications and nutrition.  Follow-up as scheduled, otherwise call for appt.     Discharge Medications:  For the complete list of discharge medications, please refer to the patient's medication reconciliation.

## 2024-10-10 NOTE — ASSESSMENT & PLAN NOTE
Blood pressure is reviewed and acceptable.  Holding off on home medications of Amlodipine, ARB and HCTZ  Blood pressures have been 110s to 130s during her stay here without giving medications she is recommended to stay off of her medications upon discharge as well.  She thinks that her recent weight loss could also have contributed to this.  She is recommended to try and check blood pressure at home 1-2 times a day and follow-up with PCP within 1 week.  Advised if persistently elevated above 150s she can start at least amlodipine

## 2024-10-10 NOTE — PROGRESS NOTES
Progress Note - Acute Pain   Name: Noris Heck 69 y.o. female I MRN: [id number]  Unit/Bed#: S -01 I Date of Admission: 10/7/2024   Date of Service: 10/10/2024 I Hospital Day: 2    Assessment & Plan  S/P total knee arthroplasty, left  Patient with history of left knee DJD who failed conservative treatment  Underwent elective left total knee arthroplasty on 10/7/2024 with orthopedic surgery  Received left-sided single shot adductor canal block with Exparel for postoperative analgesia  Denies any residual sensory/motor deficits secondary to block.      Multimodal analgesia:  Tylenol 975 mg every 8 hours scheduled  Robaxin 750 mg every 6 hours scheduled for muscle spasm/tightness  Gabapentin 100 mg daily at bedtime  Will hold off on treatment with NSAIDs given prior reaction to naproxen  Oxycodone 5 mg every 4 hours as needed for moderate pain  Oxycodone 10 mg every 4 hours as needed for severe pain  IV Dilaudid to stop now in anticipation for dc later today  Narcan as needed for respiratory depression/opioid reversal  Continue bowel regimen to prevent opioid-induced constipation    Suggest the following at discharge:  Tylenol 975 mg every 8 hours scheduled  Robaxin 750 mg every 6 hours for muscle spasm/tightness  Gabapentin 100 mg daily at bedtime  Oxycodone 5 mg / 10 mg every 6 hours as needed for moderate/severe pain  Patient will follow-up in the transitional pain clinic within 1 week following discharge.  Referral placed and contact information added to discharge instructions.      APS will sign off at this time. Thank you for the consult. All opioids and other analgesics to be written at discretion of primary team. Please contact Acute Pain Service - via [employer] from [phone] with additional questions or concerns. See [employer] or [employer] for additional contacts and after hours information.    Subjective   Noris Heck is a 69 y.o. female with past medical history significant for arthritis, DM  2, HLD, HTN, obesity who presented and underwent an elective left total knee arthroplasty with orthopedic surgery on 10/7/2024. Patient has history of DJD and failed conservative treatment measures. Patient received left-sided single shot adductor canal block with Exparel for postoperative analgesia. APS was consulted for postoperative block follow-up and recommendations and management of postoperative pain.     Pain History  Current pain location(s):  Pain Score: 7  Pain Location/Orientation: Orientation: Left, Location: Knee  Pain Scale: Pain Assessment Tool: 0-10  24 hour history: Patient seen this morning following physical therapy session.  No evidence of acute distress and she reports feeling much better in terms of pain today.  She feels as needed oxycodone has provided adequate pain relief and makes her pain tolerable.  She has been cleared for discharge from a physical therapy standpoint and is cleared for discharge from an acute pain standpoint.    Opioid requirement previous 24 hours:   30 mg oxycodone    Meds/Allergies   all current active meds have been reviewed, current meds:   Current Facility-Administered Medications:     acetaminophen (TYLENOL) tablet 975 mg, Q8H NANDO    ascorbic acid (VITAMIN C) tablet 500 mg, BID    bisacodyl (DULCOLAX) rectal suppository 10 mg, Daily PRN    calcium carbonate (TUMS) chewable tablet 1,000 mg, Daily PRN    docusate sodium (COLACE) capsule 100 mg, BID    enoxaparin (LOVENOX) subcutaneous injection 40 mg, Daily    folic acid (FOLVITE) tablet 1 mg, Daily    gabapentin (NEURONTIN) capsule 100 mg, HS    insulin lispro (HumALOG/ADMELOG) 100 units/mL subcutaneous injection 1-5 Units, HS    insulin lispro (HumALOG/ADMELOG) 100 units/mL subcutaneous injection 1-6 Units, TID AC **AND** Fingerstick Glucose (POCT), TID AC    methocarbamol (ROBAXIN) tablet 750 mg, Q6H NANDO    naloxone (NARCAN) 0.04 mg/mL syringe 0.04 mg, Q1MIN PRN    ondansetron (ZOFRAN) injection 4 mg, Q6H PRN     oxyCODONE (ROXICODONE) immediate release tablet 10 mg, Q4H PRN    oxyCODONE (ROXICODONE) IR tablet 5 mg, Q4H PRN    polyethylene glycol (MIRALAX) packet 17 g, Daily    pravastatin (PRAVACHOL) tablet 80 mg, Daily With Dinner    senna (SENOKOT) tablet 8.6 mg, Daily, and PTA meds:   Prior to Admission Medications   Prescriptions Last Dose Informant Patient Reported? Taking?   Cholecalciferol 25 MCG (1000 UT) tablet Past Month  Yes Yes   Sig: Take 1,000 Units by mouth   Multiple Vitamins-Minerals (PRESERVISION AREDS PO) More than a month  Yes No   Sig: Take 1 tablet by mouth 2 (two) times a day   Multiple Vitamins-Minerals (multivitamin with minerals) tablet More than a month  No No   Sig: TAKE 1 TABLET BY MOUTH EVERY DAY   Ozempic, 0.25 or 0.5 MG/DOSE, 2 MG/3ML injection pen Past Month  Yes Yes   Sig: Inject 0.5 mg under the skin every 7 days Mondays   Ozempic, 1 MG/DOSE, 4 MG/3ML injection pen Past Month  Yes Yes   Sig: Inject 1 mg under the skin every 7 days   amLODIPine (NORVASC) 5 mg tablet 10/6/2024  Yes Yes   Sig: Take 5 mg by mouth 2 (two) times a day   ascorbic acid (VITAMIN C) 500 MG tablet 10/6/2024  No Yes   Sig: Take 1 tablet (500 mg total) by mouth 2 (two) times a day   celecoxib (CeleBREX) 200 mg capsule Past Month  No Yes   Sig: TAKE 1 CAPSULE BY MOUTH EVERY DAY   Patient taking differently: Take 200 mg by mouth daily as needed   clotrimazole-betamethasone (LOTRISONE) 1-0.05 % cream More than a month  No No   Sig: Apply topically 2 (two) times a day   Patient taking differently: Apply topically if needed   ferrous sulfate 324 (65 Fe) mg 10/6/2024  No Yes   Sig: Take 1 tablet (324 mg total) by mouth 2 (two) times a day before meals   folic acid (FOLVITE) 1 mg tablet 10/6/2024  No Yes   Sig: Take 1 tablet (1 mg total) by mouth daily   metFORMIN (GLUCOPHAGE) 1000 MG tablet 10/6/2024  Yes Yes   Sig: Take 1 tablet by mouth 2 (two) times a day with meals   rosuvastatin (CRESTOR) 10 MG tablet Past Week  Yes Yes    Sig: Take 1 tablet by mouth daily at bedtime   valsartan-hydrochlorothiazide (DIOVAN-HCT) 320-12.5 MG per tablet 10/6/2024  Yes Yes   Sig: Take 1 tablet by mouth daily      Facility-Administered Medications: None     Allergies   Allergen Reactions    Naproxen Other (See Comments)     angioedema       Objective :  Temp:  [98.3 °F (36.8 °C)-98.6 °F (37 °C)] 98.6 °F (37 °C)  HR:  [] 86  BP: (110-129)/(51-74) 122/60  Resp:  [16-17] 17  SpO2:  [94 %-95 %] 94 %    Physical Exam  Constitutional:       General: She is awake.      Appearance: She is well-developed.   HENT:      Head: Normocephalic and atraumatic.   Eyes:      Conjunctiva/sclera: Conjunctivae normal.   Cardiovascular:      Rate and Rhythm: Normal rate.   Pulmonary:      Effort: Pulmonary effort is normal. No respiratory distress.   Musculoskeletal:         General: Normal range of motion.      Comments: Left knee with Ace wrap in place, able to wiggle toes.   Skin:     General: Skin is warm.   Neurological:      Mental Status: She is alert and oriented to person, place, and time.   Psychiatric:         Attention and Perception: Attention and perception normal.         Mood and Affect: Mood and affect normal.         Speech: Speech normal.         Behavior: Behavior normal. Behavior is cooperative.         Thought Content: Thought content normal.         Cognition and Memory: Cognition and memory normal.         Judgment: Judgment normal.            Lab Results: I have reviewed the following results:  Estimated Creatinine Clearance: 105.9 mL/min (A) (by C-G formula based on SCr of 0.56 mg/dL (L)).  Lab Results   Component Value Date    WBC 10.21 (H) 10/10/2024    HGB 9.2 (L) 10/10/2024    HCT 29.1 (L) 10/10/2024     10/10/2024         Component Value Date/Time    K 3.7 10/10/2024 0538    K 4.6 09/12/2023 1031     10/10/2024 0538     09/12/2023 1031    CO2 30 10/10/2024 0538    CO2 27 09/12/2023 1031    BUN 8 10/10/2024 0538    BUN 18  09/12/2023 1031    CREATININE 0.56 (L) 10/10/2024 0538    CREATININE 0.56 09/12/2023 1031         Component Value Date/Time    CALCIUM 9.9 10/10/2024 0538    CALCIUM 11.1 (H) 05/29/2024 0832    ALKPHOS 51 09/12/2024 1105    ALKPHOS 57 09/12/2023 1031    AST 15 09/12/2024 1105    AST 16 09/12/2023 1031    ALT 12 09/12/2024 1105    ALT 14 09/12/2023 1031    TP 7.7 09/12/2024 1105    TP 7.6 09/12/2023 1031    ALB 4.4 09/12/2024 1105    ALB 4.3 09/12/2023 1031       Imaging Results Review: No pertinent imaging studies reviewed.  Other Study Results Review: No additional pertinent studies reviewed.

## 2024-10-10 NOTE — PROGRESS NOTES
Progress Note - Orthopedics   Name: Noris Heck 69 y.o. female I MRN: [id number]  Unit/Bed#: S -01 I Date of Admission: 10/7/2024   Date of Service: 10/10/2024 I Hospital Day: 2    Assessment & Plan  S/P total knee arthroplasty, left  69 yoF s/p left total knee arthroplasty with Dr. Yepez on 10/07/2024 (POD 3).   Weight bearing as tolerated to the left lower extremity   PT/OT   Cleared for discharge home.   Lovenox while admitted   ASA 81 mg BID x 35 days on discharge   Appreciate recommendations for medical management per IM   Patient with sustained tachycardia yesterday. Now resolved.   Workup included CT PE protocol, negative.   ECHO completed this morning, pending.   Dispo pending medicine clearance.    Appreciate APS assistance for pain control  Current regimen:  Oxycodone 5-10mg q4-h  Robaxin 750mg q6h  Gabapentin 100mg qhs  Tylenol 975mg q8h  Follow up with Dr. Yepez in 2 weeks   Sinus tachycardia  As above. Appreciate medicine assistance.     Orthopedics service will follow.  Please contact the SecureChat role for the Orthopedics service with any questions/concerns.  Case discussed with medicine service in detail today.   Case discussed with acute pain service today.   Discharge pending medical clearance.     Subjective   69 y.o.female seen and examined at bedside. Reports pain continues to improve. Down to a 5/10. Pleased about this. Eating/drinking well. Passing gas well. Moved her bowels yesterday. Voiding well. No other complaints.     Objective :  Temp:  [98.3 °F (36.8 °C)-98.6 °F (37 °C)] 98.6 °F (37 °C)  HR:  [] 86  BP: (110-129)/(51-74) 122/60  Resp:  [16-17] 17  SpO2:  [94 %-95 %] 94 %    Physical Exam  Musculoskeletal: leftlower  Surgical dressings c/d/I without strike through.   Motor intact to +FHL/EHL, +ankle dorsi/plantar flexion  Sensation intact to saphenous, sural, tibial, superficial peroneal nerve, and deep peroneal  2+ DP pulse  No calf swelling or tenderness to  "palpation      Lab Results: I have reviewed the following results:  Recent Labs     10/08/24  0602 10/09/24  0552 10/09/24  0553 10/10/24  0538   WBC 12.95* 12.27*  --  10.21*   HGB 10.0* 10.3*  --  9.2*   HCT 33.7* 31.9*  --  29.1*    239  --  229   BUN 13  --  11 8   CREATININE 0.70  --  0.60 0.56*     Blood Culture:  No results found for: \"BLOODCX\"  Wound Culture: No results found for: \"WOUNDCULT\"  "

## 2024-10-10 NOTE — ASSESSMENT & PLAN NOTE
69 yoF s/p left total knee arthroplasty with Dr. Yepez on 10/07/2024 (POD 3).   Weight bearing as tolerated to the left lower extremity   PT/OT   Cleared for discharge home.   Lovenox while admitted   ASA 81 mg BID x 35 days on discharge   Appreciate recommendations for medical management per IM   Patient with sustained tachycardia yesterday. Now resolved.   Workup included CT PE protocol, negative.   ECHO completed this morning, pending.   Dispo pending medicine clearance.    Appreciate APS assistance for pain control  Current regimen:  Oxycodone 5-10mg q4-h  Robaxin 750mg q6h  Gabapentin 100mg qhs  Tylenol 975mg q8h  Follow up with Dr. Yepez in 2 weeks

## 2024-10-10 NOTE — PROGRESS NOTES
Progress Note - Hospitalist   Name: Noris Heck 69 y.o. female I MRN: [id number]  Unit/Bed#: S -01 I Date of Admission: 10/7/2024   Date of Service: 10/10/2024 I Hospital Day: 2    Assessment & Plan  S/P total knee arthroplasty, left  Presentation: Patient presented to [facility] for total left knee arthroplasty secondary to failed conservative treatment of left knee DJD with varus deformity and limited flexion. Patient was admitted for continued pain control post operatively.  POD #2  Pain is better controlled now, having bowel movements, tolerating food  Sinus tachycardia  New onset, was in 90s yesterday, today 110s at rest & 120s with activity  EKG - sinus rhythm,   Attempted to give 500 cc bolus without much help, suggested better pain control but it seems like pain is better than before  Hence underwent CTA PE study which was negative and check 2D echocardiogram which showed LVEF of 60%, mild to moderate AAS, normal RV function  Retzer back to normal today hence she is cleared for discharge  Obesity (BMI 35.0-39.9 without comorbidity)  Encouraged lifestyle modifications.  Type 2 diabetes mellitus, without long-term current use of insulin ([facility])    Lab Results   Component Value Date    HGBA1C 6.2 (H) 09/12/2024   Home regimen of Metformin and Ozempic, will hold while inpatient.  Accu checks and SSI.  Hypoglycemia protocol.  Hyperlipidemia, unspecified  Continue statin.  Primary hypertension  Blood pressure is reviewed and acceptable.  Holding off on home medications of Amlodipine, ARB and HCTZ  Blood pressures have been 110s to 130s during her stay here without giving medications she is recommended to stay off of her medications upon discharge as well.  She thinks that her recent weight loss could also have contributed to this.  She is recommended to try and check blood pressure at home 1-2 times a day and follow-up with PCP within 1 week.  Advised if persistently elevated above 150s she can start at  least amlodipine    VTE Pharmacologic Prophylaxis: VTE Score: 3 Moderate Risk (Score 3-4) - Pharmacological DVT Prophylaxis Ordered: enoxaparin (Lovenox).    Mobility:   Basic Mobility Inpatient Raw Score: 18  JH-HLM Goal: 6: Walk 10 steps or more  JH-HLM Achieved: 6: Walk 10 steps or more  JH-HLM Goal achieved. Continue to encourage appropriate mobility.    Patient Centered Rounds: I performed bedside rounds with nursing staff today.   Discussions with Specialists or Other Care Team Provider: ortho    Education and Discussions with Family / Patient:  patient.     Current Length of Stay: 2 day(s)  Current Patient Status: Inpatient   Certification Statement:  ready for DC  Discharge Plan: [facility] is following this patient on consult. They are medically stable for discharge when deemed appropriate by primary service.    Code Status: Prior    Subjective   Feeling much better today, pain is better    Objective :  Temp:  [98.3 °F (36.8 °C)-98.6 °F (37 °C)] 98.6 °F (37 °C)  HR:  [81-93] 86  BP: (110-127)/(51-61) 122/60  Resp:  [16-17] 17  SpO2:  [94 %-95 %] 94 %  O2 Device: None (Room air)    Body mass index is 37.76 kg/m².     Input and Output Summary (last 24 hours):     Intake/Output Summary (Last 24 hours) at 10/10/2024 1828  Last data filed at 10/10/2024 0819  Gross per 24 hour   Intake 240 ml   Output 400 ml   Net -160 ml       Physical Exam  Vitals reviewed.   HENT:      Head: Normocephalic and atraumatic.   Cardiovascular:      Rate and Rhythm: Normal rate and regular rhythm.   Pulmonary:      Effort: Pulmonary effort is normal.      Breath sounds: Normal breath sounds.   Abdominal:      Palpations: Abdomen is soft.   Musculoskeletal:      Right lower leg: No edema.      Left lower leg: No edema.   Neurological:      Mental Status: She is alert and oriented to person, place, and time.           Lines/Drains:              Lab Results: I have reviewed the following results:   Results from last 7 days   Lab Units  10/10/24  0538   WBC Thousand/uL 10.21*   HEMOGLOBIN g/dL 9.2*   HEMATOCRIT % 29.1*   PLATELETS Thousands/uL 229     Results from last 7 days   Lab Units 10/10/24  0538   SODIUM mmol/L 137   POTASSIUM mmol/L 3.7   CHLORIDE mmol/L 102   CO2 mmol/L 30   BUN mg/dL 8   CREATININE mg/dL 0.56*   ANION GAP mmol/L 5   CALCIUM mg/dL 9.9   GLUCOSE RANDOM mg/dL 100         Results from last 7 days   Lab Units 10/10/24  1105 10/10/24  0715 10/09/24  2139 10/09/24  1629 10/09/24  1059 10/09/24  0706 10/08/24  2120 10/08/24  1735 10/08/24  1048 10/08/24  0708 10/08/24  0004 10/07/24  1215   POC GLUCOSE mg/dl 100 122 119 117 141* 144* 136 182* 161* 124 131 125               Recent Cultures (last 7 days):         Imaging Results Review: I reviewed radiology reports from this admission including: CT chest and Echocardiogram.  Other Study Results Review: No additional pertinent studies reviewed.    Last 24 Hours Medication List:   No current facility-administered medications for this encounter.    Administrative Statements   Today, Patient Was Seen By: Radhika Jc MD  I have spent a total time of 45 minutes in caring for this patient on the day of the visit/encounter including Risks and benefits of tx options, Patient and family education, Documenting in the medical record, Reviewing / ordering tests, medicine, procedures  , Obtaining or reviewing history  , and Communicating with other healthcare professionals .    **Please Note: This note may have been constructed using a voice recognition system.**

## 2024-10-10 NOTE — ASSESSMENT & PLAN NOTE
Patient with history of left knee DJD who failed conservative treatment  Underwent elective left total knee arthroplasty on 10/7/2024 with orthopedic surgery  Received left-sided single shot adductor canal block with Exparel for postoperative analgesia  Denies any residual sensory/motor deficits secondary to block.      Multimodal analgesia:  Tylenol 975 mg every 8 hours scheduled  Robaxin 750 mg every 6 hours scheduled for muscle spasm/tightness  Gabapentin 100 mg daily at bedtime  Will hold off on treatment with NSAIDs given prior reaction to naproxen  Oxycodone 5 mg every 4 hours as needed for moderate pain  Oxycodone 10 mg every 4 hours as needed for severe pain  IV Dilaudid to stop now in anticipation for dc later today  Narcan as needed for respiratory depression/opioid reversal  Continue bowel regimen to prevent opioid-induced constipation    Suggest the following at discharge:  Tylenol 975 mg every 8 hours scheduled  Robaxin 750 mg every 6 hours for muscle spasm/tightness  Gabapentin 100 mg daily at bedtime  Oxycodone 5 mg / 10 mg every 6 hours as needed for moderate/severe pain  Patient will follow-up in the transitional pain clinic within 1 week following discharge.  Referral placed and contact information added to discharge instructions.

## 2024-10-10 NOTE — PHYSICAL THERAPY NOTE
PHYSICAL THERAPY NOTE          Patient Name: Noris Heck  Today's Date: 10/10/2024             10/10/24 0920   PT Last Visit   PT Visit Date 10/10/24   Note Type   Note Type Treatment   Pain Assessment   Pain Assessment Tool 0-10   Pain Score 7   Pain Onset/Description Onset: Ongoing   Effect of Pain on Daily Activities limits comfort   Patient's Stated Pain Goal No pain   Hospital Pain Intervention(s) Repositioned;Ambulation/increased activity;Rest   Multiple Pain Sites No   Restrictions/Precautions   Weight Bearing Precautions Per Order Yes   LLE Weight Bearing Per Order WBAT   Other Precautions Chair Alarm;Bed Alarm;WBS;Fall Risk;Pain   General   Chart Reviewed Yes   Additional Pertinent History pt reports 7/10 posterior L knee   Response to Previous Treatment Patient with no complaints from previous session.   Family/Caregiver Present No   Cognition   Overall Cognitive Status WFL   Arousal/Participation Alert;Responsive;Cooperative   Attention Within functional limits   Orientation Level Oriented X4   Memory Within functional limits   Following Commands Follows multistep commands with increased time or repetition   Comments pt continues to be pleasant and cooperative throughout PT intervention   Subjective   Subjective pt stated 7/10 pain posterior L knee pre/post tx session. pt stated pain is tolerable   Bed Mobility   Additional Comments pt seated OOB in the recliner pre tx session but supine post tx session   Transfers   Sit to Stand 5  Supervision   Additional items Assist x 1;Armrests;Increased time required;Verbal cues   Stand to Sit 6  Modified independent   Additional items Assist x 1;Armrests;Increased time required;Verbal cues  (pt demonstrated good recall on hand placement while descending back into the recliner)   Stand pivot 5  Supervision   Additional items Assist x 1;Increased time required;Verbal cues    Additional Comments pt progressing with skilled PT intervention in terms of functional transfers. pt demonstrated good recall on hand placement while descending back into the recliner   Ambulation/Elevation   Gait pattern Decreased foot clearance;Decreased L stance;Step to;Excessively slow;Decreased hip extension;Decreased heel strike;Decreased toe off   Gait Assistance 5  Supervision   Additional items Assist x 1;Verbal cues   Assistive Device Rolling walker   Distance 60'x1 RW   Stair Management Assistance 5  Supervision   Additional items Assist x 1;Verbal cues;Increased time required   Stair Management Technique Two rails;Step to pattern;Foreward;Backward   Number of Stairs 5   Ambulation/Elevation Additional Comments pt was able to increase ambulation distance to 60'x1 RW and pt also completed 5 steps in todays tx session   Balance   Static Sitting Good   Dynamic Sitting Fair   Static Standing Fair -   Dynamic Standing Poor +   Ambulatory Fair -  (w/ RW)   Endurance Deficit   Endurance Deficit Yes   Endurance Deficit Description limited ambulation distance   Activity Tolerance   Activity Tolerance Patient limited by fatigue   Nurse Made Aware Spoke to RN   Exercises   Quad Sets Supine;10 reps;AROM;Left   Hip Abduction Sitting;10 reps;AROM;Bilateral   Hip Adduction Sitting;10 reps;AROM;Bilateral  (pillow squeezes)   Knee AROM Long Arc Quad Sitting;10 reps;AROM;Left   Ankle Pumps Sitting;20 reps;AROM;Bilateral   Assessment   Prognosis Fair   Problem List Decreased strength;Decreased range of motion;Decreased endurance;Impaired balance;Decreased mobility;Decreased skin integrity;Obesity;Pain;Orthopedic restrictions   Assessment pt began tx session seated OOB in the recliner as pt was agreeable to participate in Pt intervention. In comparison to previous tx sessions pt continues to prgress with skilled PT intervention. pt continues to perform functional transfers from recliner to RW with /s but demonstrated good  recall on hand placement while descending back into the recliner with mod I, no LOB. pt increased ambulation distance to 60'x1 RW with /s, no LOB while holding a conversation in the hallway. pt was able to participate in TE activities in todays tx session with AROM and no increases in pain. pt also was able to increase steps completed as pt completed 5 steps with bilateral hand rails, /s and no LOB. pt did require LLE management while returning to supine position post tx session but pt was able to reposition herself towards HOB with use of bed rails and /s. Post tx pt in bed with call bell and all pt needs met.   Goals   Patient Goals to have less pain   STG Expiration Date 10/17/24   PT Treatment Day 4   Plan   Treatment/Interventions Functional transfer training;LE strengthening/ROM;Therapeutic exercise;Endurance training;Elevations;Patient/family training;Equipment eval/education;Bed mobility;Gait training;Spoke to nursing   Progress Progressing toward goals   PT Frequency Twice a day   Discharge Recommendation   Rehab Resource Intensity Level, PT III (Minimum Resource Intensity)   Equipment Recommended Walker   Walker Package Recommended Wheeled walker   AM-PAC Basic Mobility Inpatient   Turning in Flat Bed Without Bedrails 3   Lying on Back to Sitting on Edge of Flat Bed Without Bedrails 3   Moving Bed to Chair 3   Standing Up From Chair Using Arms 3   Walk in Room 3   Climb 3-5 Stairs With Railing 3   Basic Mobility Inpatient Raw Score 18   Basic Mobility Standardized Score 41.05   [facility] Highest Level Of Mobility   -HLM Goal 6: Walk 10 steps or more   -HLM Achieved 7: Walk 25 feet or more   Education   Education Provided Mobility training;Assistive device   Patient Demonstrates acceptance/verbal understanding   End of Consult   Patient Position at End of Consult Supine;Bed/Chair alarm activated;All needs within reach   The patient's AM-PAC Basic Mobility Inpatient Short Form Raw Score is 18. A Raw  score of greater than 16 suggests the patient may benefit from discharge to home. Please also refer to the recommendation of the Physical Therapist for safe discharge planning.    Declan Lehman

## 2024-10-10 NOTE — PROGRESS NOTES
Brief orthopedics note    Patient seen and re-evaluated. Pain remains controlled. She is eager for discharge.   Cleared by PT/OT. Cleared by APS. Cleared by medicine team.   Discussed with medicine that patient has not required anti-hypertensive's while admitted. Per medicine team, patient not to resume upon discharge. Will hold, and have patient follow closely with her PCP upon discharge. Discussed with patient at length, she voices understanding and will follow up with her PCP within the week.  Will discharge home today. All in agreement.   All questions asked/answered.     Nelly Leach PA-C

## 2024-10-10 NOTE — CASE MANAGEMENT
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  (HCC) 2024     Type 2 diabetes mellitus, without long-term current use of insulin (HCC) 2023     Hyperlipidemia, unspecified 2021     Primary hypertension 2021       LOS (days): 2  Geometric Mean LOS (GMLOS) (days): 1.7  Days to GMLOS:-0.1     OBJECTIVE:  Risk of Unplanned Readmission Score: 7.56     Current admission status: Inpatient   Preferred Pharmacy:   [id number] - [address] [address]  Phone: [phone] Fax: [phone]    Primary Care Provider: Maykel Lopez MD    Primary Insurance: [facility]  Secondary Insurance:     DISCHARGE DETAILS:      Requested Home Health Care         Home Health Agency Name:: [facility]    Other Referral/Resources/Interventions Provided:  Referral Comments: CM reserved [facility] in [place].    Discharge Destination Plan::  ([facility])    IMM Given (Date):: 10/10/24 (IMM was presented and reviewed with patient. Patient verbalized understanding, signed, and was provided original. Copy placed in scan bin for patient's chart.)  IMM Given to:: Patient  Family notified::     IMM reviewed with patient, patient agrees with discharge determination.

## 2024-10-11 ENCOUNTER — TELEPHONE (OUTPATIENT)
Dept: OBGYN CLINIC | Facility: HOSPITAL | Age: 69
End: 2024-10-11

## 2024-10-11 ENCOUNTER — HOME CARE VISIT (OUTPATIENT)
Dept: HOME HEALTH SERVICES | Facility: HOME HEALTHCARE | Age: 69
End: 2024-10-11
Payer: COMMERCIAL

## 2024-10-11 VITALS — OXYGEN SATURATION: 99 % | DIASTOLIC BLOOD PRESSURE: 72 MMHG | HEART RATE: 95 BPM | SYSTOLIC BLOOD PRESSURE: 142 MMHG

## 2024-10-11 PROCEDURE — G0151 HHCP-SERV OF PT,EA 15 MIN: HCPCS

## 2024-10-11 PROCEDURE — 400013 VN SOC

## 2024-10-11 NOTE — UTILIZATION REVIEW
NOTIFICATION OF ADMISSION DISCHARGE   This is a Notification of Discharge from [facility]. Please be advised that this patient has been discharge from our facility. Below you will find the admission and discharge date and time including the patient’s disposition.   UTILIZATION REVIEW CONTACT:  Claribel Haywood  Utilization   Network Utilization Review Department  Phone: [phone] x carefully listen to the prompts. All voicemails are confidential.  Email: [email or web address]     ADMISSION INFORMATION  PRESENTATION DATE: 10/7/2024  7:54 AM  OBERVATION ADMISSION DATE: N/A  INPATIENT ADMISSION DATE: 10/8/24  2:12 PM   DISCHARGE DATE: 10/10/2024  3:48 PM   DISPOSITION:Home with Home Health Care    Network Utilization Review Department  ATTENTION: Please call with any questions or concerns to [phone] and carefully listen to the prompts so that you are directed to the right person. All voicemails are confidential.   For Discharge needs, contact Care Management DC Support Team at [phone] opt. 2  Send all requests for admission clinical reviews, approved or denied determinations and any other requests to dedicated fax number below belonging to the campus where the patient is receiving treatment. List of dedicated fax numbers for the Facilities:  FACILITY NAME UR FAX NUMBER   ADMISSION DENIALS (Administrative/Medical Necessity) [phone]   DISCHARGE SUPPORT TEAM ([facility]) [phone]   PARENT CHILD HEALTH (Maternity/NICU/Pediatrics) [phone]   [facility] [phone]   [facility] [phone]   [facility] [phone]   [facility] [phone]   [facility] [phone]   [facility] [phone]   [facility] [phone]   [facility]  [phone]   [facility] [phone]   [facility] [phone]   [facility] [phone]   [facility] [phone]

## 2024-10-11 NOTE — TELEPHONE ENCOUNTER
Patient contacted for a postoperative follow up assessment. Patient states current pain level of a 7/10 when sitting and 9/10 when walking with RW.  Patient denies increase in swelling and dressing is Dressing C/D/I Patient isicing the site regularly. NN educated patient on post-op bruising, swelling, and icing. In home PT 10/11 at 12:30AM.     We reviewed patients AVS medication list. Patient is taking Tylenol 650mg every 8 hours, Oxycodone 5mg every 6 hours, ASA 81mg BID, Colace 100mg BID, and Gabapentin 100mg Robaxin 750mg every 6 hours..Patient has had a BM but ispassing gas.       Patient denies nausea, vomiting, abdominal pain, chest pain, shortness of breath, fever, dizziness, and calf pain. Patient confirmed post-op appointment with surgeon on  10/22 at 1:30PM .Patient does not have any other questions or concerns at this time. Pt was encouraged to call with any questions, concerns or issues.  .

## 2024-10-14 ENCOUNTER — APPOINTMENT (OUTPATIENT)
Dept: PHYSICAL THERAPY | Facility: CLINIC | Age: 69
End: 2024-10-14
Payer: COMMERCIAL

## 2024-10-14 ENCOUNTER — HOME CARE VISIT (OUTPATIENT)
Dept: HOME HEALTH SERVICES | Facility: HOME HEALTHCARE | Age: 69
End: 2024-10-14
Payer: COMMERCIAL

## 2024-10-14 ENCOUNTER — TELEPHONE (OUTPATIENT)
Dept: OBGYN CLINIC | Facility: HOSPITAL | Age: 69
End: 2024-10-14

## 2024-10-14 VITALS — DIASTOLIC BLOOD PRESSURE: 84 MMHG | SYSTOLIC BLOOD PRESSURE: 142 MMHG

## 2024-10-14 PROCEDURE — G0151 HHCP-SERV OF PT,EA 15 MIN: HCPCS

## 2024-10-14 NOTE — TELEPHONE ENCOUNTER
Called pt and attempted to schedule for Transitional pain clinic.    Pt declined at this time, and stated she isn't feeling real great right now. She has our number and will call back to schedule if she needs us.    TP physicians notified.

## 2024-10-15 ENCOUNTER — HOME CARE VISIT (OUTPATIENT)
Dept: HOME HEALTH SERVICES | Facility: HOME HEALTHCARE | Age: 69
End: 2024-10-15
Payer: COMMERCIAL

## 2024-10-16 ENCOUNTER — HOME CARE VISIT (OUTPATIENT)
Dept: HOME HEALTH SERVICES | Facility: HOME HEALTHCARE | Age: 69
End: 2024-10-16
Payer: COMMERCIAL

## 2024-10-16 PROCEDURE — G0151 HHCP-SERV OF PT,EA 15 MIN: HCPCS

## 2024-10-17 ENCOUNTER — TELEPHONE (OUTPATIENT)
Age: 69
End: 2024-10-17

## 2024-10-17 ENCOUNTER — APPOINTMENT (OUTPATIENT)
Dept: PHYSICAL THERAPY | Facility: CLINIC | Age: 69
End: 2024-10-17
Payer: COMMERCIAL

## 2024-10-17 VITALS — SYSTOLIC BLOOD PRESSURE: 128 MMHG | DIASTOLIC BLOOD PRESSURE: 60 MMHG

## 2024-10-17 NOTE — TELEPHONE ENCOUNTER
Noris Yepez MD     Patient had surgery last week 10/07/24  Patient is still having some pain and needs a refill    oxyCODONE (ROXICODONE) 5 immediate release tablet      Pharmacy  [facility] [address]  [address]    CB: [phone]

## 2024-10-18 ENCOUNTER — HOME CARE VISIT (OUTPATIENT)
Dept: HOME HEALTH SERVICES | Facility: HOME HEALTHCARE | Age: 69
End: 2024-10-18
Payer: COMMERCIAL

## 2024-10-18 VITALS — DIASTOLIC BLOOD PRESSURE: 68 MMHG | SYSTOLIC BLOOD PRESSURE: 142 MMHG

## 2024-10-18 PROCEDURE — G0151 HHCP-SERV OF PT,EA 15 MIN: HCPCS

## 2024-10-18 NOTE — TELEPHONE ENCOUNTER
Pt called refill line to check the status of the refill. Pt only has 3 pills left. Pt would like a call back to let her know if it is going to be called in.

## 2024-10-19 ENCOUNTER — NURSE TRIAGE (OUTPATIENT)
Dept: OTHER | Facility: OTHER | Age: 69
End: 2024-10-19

## 2024-10-19 DIAGNOSIS — M17.12 PRIMARY OSTEOARTHRITIS OF LEFT KNEE: Primary | ICD-10-CM

## 2024-10-19 RX ORDER — OXYCODONE HYDROCHLORIDE 5 MG/1
5 TABLET ORAL EVERY 4 HOURS PRN
Qty: 30 TABLET | Refills: 0 | Status: SHIPPED | OUTPATIENT
Start: 2024-10-19 | End: 2024-10-21 | Stop reason: SDUPTHER

## 2024-10-19 NOTE — TELEPHONE ENCOUNTER
"Reason for Disposition  • [1] SEVERE knee pain (e.g., excruciating, pain scale 8-10) AND [2] not controlled with pain medications    Answer Assessment - Initial Assessment Questions  1. SYMPTOM: \"What's the main symptom you're concerned about?\" (e.g., drainage, fever, incision opened up, pain, redness, swelling)      Left knee pain    2. ONSET: \"When did pain  start?\"      Yesterday     3. DATE of SURGERY: \"When was the surgery?\"        10/7    4. SURGERY SITE: \"Which knee did you have replaced?\" (e.g., right, left, both knees)       ARTHROPLASTY KNEE TOTAL (Left: Knee)    5. REDNESS: \"Is there any redness at the incision site?\" If Yes, ask: \"How wide across is the redness?\" (e.g., inches, centimeters)       Denies    6. PAIN: \"Is there any pain?\" If Yes, ask: \"Where is it located?\" (e.g., knee, calf; right or left side). How bad is it?\"  (Scale 1-10; or mild, moderate, severe)      Severe: 9/10     7. LEG SWELLING: \"Is there any leg swelling?\" If Yes, ask: \"Where is the swelling?\"  (e.g., right, left or both legs;  localized swelling just around incision, entire leg looks swollen, swollen area on thigh or calf). \"Is the swelling getting worse, staying the same, or getting better?\"      Mild swelling    8. BLEEDING: \"Is there any bleeding?\" If Yes, ask: \"How much?\" and \"Where?\"      Denies    9. DRAINAGE: \"Is there any drainage from the incision site?\" If Yes, ask: \"What does it look like?\" (e.g., clear, cloudy, pink, red, yellow, pus). \"How much drainage is there?\" (e.g., drops, teaspoon)      Denies    10. FEVER: \"Do you have a fever?\" If Yes, ask: \"What is your temperature, how was it measured, and when did it start?\"        Denies    11. OTHER SYMPTOMS: \"Do you have any other symptoms?\" (e.g., dizziness, rash elsewhere on body, shaking chills, weakness)        Hot/cold flashes    Protocols used: Post-Op Total Knee Replacement Follow-up Call-Adult-    "

## 2024-10-19 NOTE — TELEPHONE ENCOUNTER
"Regarding: med refill  ----- Message from Marcial SAEZ sent at 10/19/2024 10:25 AM EDT -----  \" I called Thursday and Friday for a refill of my oxycodone, I had surgery this past week and I'm still in pain.\"    "

## 2024-10-21 ENCOUNTER — APPOINTMENT (OUTPATIENT)
Dept: PHYSICAL THERAPY | Facility: CLINIC | Age: 69
End: 2024-10-21
Payer: COMMERCIAL

## 2024-10-21 ENCOUNTER — HOME CARE VISIT (OUTPATIENT)
Dept: HOME HEALTH SERVICES | Facility: HOME HEALTHCARE | Age: 69
End: 2024-10-21
Payer: COMMERCIAL

## 2024-10-21 VITALS — DIASTOLIC BLOOD PRESSURE: 72 MMHG | SYSTOLIC BLOOD PRESSURE: 142 MMHG

## 2024-10-21 DIAGNOSIS — M17.12 PRIMARY OSTEOARTHRITIS OF LEFT KNEE: ICD-10-CM

## 2024-10-21 PROCEDURE — G0151 HHCP-SERV OF PT,EA 15 MIN: HCPCS

## 2024-10-21 RX ORDER — OXYCODONE HYDROCHLORIDE 5 MG/1
5 TABLET ORAL EVERY 4 HOURS PRN
Qty: 28 TABLET | Refills: 0 | Status: SHIPPED | OUTPATIENT
Start: 2024-10-21

## 2024-10-21 NOTE — TELEPHONE ENCOUNTER
Called patient and left VM to call NN with any questions or concerns regarding prescription refill.

## 2024-10-22 ENCOUNTER — OFFICE VISIT (OUTPATIENT)
Dept: OBGYN CLINIC | Facility: CLINIC | Age: 69
End: 2024-10-22

## 2024-10-22 ENCOUNTER — HOSPITAL ENCOUNTER (OUTPATIENT)
Dept: RADIOLOGY | Facility: HOSPITAL | Age: 69
Discharge: HOME | End: 2024-10-22
Payer: COMMERCIAL

## 2024-10-22 VITALS — WEIGHT: 220 LBS | HEIGHT: 64 IN | BODY MASS INDEX: 37.56 KG/M2

## 2024-10-22 DIAGNOSIS — M17.12 PRIMARY OSTEOARTHRITIS OF LEFT KNEE: ICD-10-CM

## 2024-10-22 DIAGNOSIS — Z96.652 S/P TOTAL KNEE REPLACEMENT USING CEMENT, LEFT: Primary | ICD-10-CM

## 2024-10-22 DIAGNOSIS — Z96.652 S/P TOTAL KNEE REPLACEMENT USING CEMENT, LEFT: ICD-10-CM

## 2024-10-22 PROCEDURE — 99024 POSTOP FOLLOW-UP VISIT: CPT | Performed by: PHYSICIAN ASSISTANT

## 2024-10-22 PROCEDURE — 73562 X-RAY EXAM OF KNEE 3: CPT

## 2024-10-22 RX ORDER — TIZANIDINE HYDROCHLORIDE 4 MG/1
4 CAPSULE, GELATIN COATED ORAL 3 TIMES DAILY PRN
Qty: 30 CAPSULE | Refills: 0 | Status: SHIPPED | OUTPATIENT
Start: 2024-10-22

## 2024-10-22 NOTE — PROGRESS NOTES
69 y.o.female presents for 2 weeks postoperative visit status post left TKA. Patient denies any chest pain, shortness or breath or calf pain .  Patient is taking aspirin for DVT prophylaxis.  Pain is well controlled with medication.  Patient is ambulating well with the assistance of a cane doing well physical therapy.  She states her pain is lessening    Review of Systems  Review of systems negative unless otherwise specified in HPI    Past Medical History  Past Medical History:   Diagnosis Date    Arthritis     Diabetes mellitus (HCC)     Hyperlipidemia     Hypertension        Past Surgical History  Past Surgical History:   Procedure Laterality Date    ANKLE FUSION Right     Patient states about 4 surgeries on ankle    CERVICAL POLYPECTOMY      COLONOSCOPY      DILATION AND CURETTAGE OF UTERUS      UT ARTHRP KNE CONDYLE&PLATU MEDIAL&LAT COMPARTMENTS Left 10/7/2024    Procedure: ARTHROPLASTY KNEE TOTAL;  Surgeon: Miracle Yepez MD;  [facility]: [facility];  Service: Orthopedics    THYROID SURGERY      nodule removed       Current Medications  Current Outpatient Medications on File Prior to Visit   Medication Sig Dispense Refill    acetaminophen (TYLENOL) 650 mg CR tablet Take 1 tablet (650 mg total) by mouth every 8 (eight) hours as needed for mild pain 30 tablet 0    ascorbic acid (VITAMIN C) 500 MG tablet Take 1 tablet (500 mg total) by mouth 2 (two) times a day 60 tablet 1    aspirin (ECOTRIN LOW STRENGTH) 81 mg EC tablet Take 1 tablet (81 mg total) by mouth 2 (two) times a day Take 1(one) 81 mg tablet twice daily x 35 days after total joint arthroplasty 70 tablet 0    Cholecalciferol 25 MCG (1000 UT) tablet Take 1,000 Units by mouth      clotrimazole-betamethasone (LOTRISONE) 1-0.05 % cream Apply topically 2 (two) times a day (Patient taking differently: Apply 2 Applications topically if needed (Yeast infection) use as needed under breasts if rash.) 15 g 1    docusate sodium (COLACE) 100 mg capsule Take 1  capsule (100 mg total) by mouth 2 (two) times a day 10 capsule 0    ferrous sulfate 324 (65 Fe) mg Take 1 tablet (324 mg total) by mouth 2 (two) times a day before meals 60 tablet 1    folic acid (FOLVITE) 1 mg tablet Take 1 tablet (1 mg total) by mouth daily 30 tablet 1    gabapentin (NEURONTIN) 100 mg capsule Take 1 capsule (100 mg total) by mouth daily at bedtime 30 capsule 0    metFORMIN (GLUCOPHAGE) 1000 MG tablet Take 1 tablet by mouth 2 (two) times a day with meals      Multiple Vitamins-Minerals (multivitamin with minerals) tablet TAKE 1 TABLET BY MOUTH EVERY DAY 30 tablet 0    Multiple Vitamins-Minerals (PRESERVISION AREDS PO) Take 1 tablet by mouth 2 (two) times a day Not taking      ondansetron (ZOFRAN) 4 mg tablet Take 1 tablet (4 mg total) by mouth every 8 (eight) hours as needed for nausea or vomiting 5 tablet 0    oxyCODONE (Roxicodone) 5 immediate release tablet Take 1 tablet (5 mg total) by mouth every 4 (four) hours as needed for severe pain Max Daily Amount: 30 mg (Patient not taking: Reported on 10/22/2024) 28 tablet 0    rosuvastatin (CRESTOR) 10 MG tablet Take 1 tablet by mouth daily at bedtime      semaglutide, 1 mg/dose, (Ozempic, 1 MG/DOSE,) 4 mg/3 mL injection pen Inject 0.75 mL (1 mg total) under the skin every 7 days      methocarbamol (ROBAXIN) 750 mg tablet Take 1 tablet (750 mg total) by mouth every 6 (six) hours for 7 days 28 tablet 0     No current facility-administered medications on file prior to visit.       Recent Labs (HCT,HGB,PT,INR,ESR,CRP,GLU,HgA1C)  0   Lab Value Date/Time    HCT 29.1 (L) 10/10/2024 0538    HGB 9.2 (L) 10/10/2024 0538    WBC 10.21 (H) 10/10/2024 0538    INR 0.98 09/12/2024 1105    HGBA1C 6.2 (H) 09/12/2024 1105    HGBA1C 6.5 (H) 02/20/2024 1008           Body mass index is 37.76 kg/m².  Wt Readings from Last 3 Encounters:   10/22/24 99.8 kg (220 lb)   10/10/24 99.8 kg (220 lb)   06/27/24 105 kg (232 lb 3.2 oz)       Physical exam   General: Awake, Alert,  Oriented   Eyes: Pupils equal, round and reactive to light    Heart: regular rate and rhythm   Lungs: No audible wheezing   Abdomen: soft  Left lower extremity      Incision well approximated without erythema no tenderness to palpation    Full knee extension 90 degrees of flexion   Active ankle dorsal and plantarflexion without pain, calf nontender palpation   sensation mildly decreased saji-incisionally otherwise intact   Distal pulses present      Imaging  X rays of the left knee reviewed.  X rays reveal excellently aligned left total knee arthroplasty without evidence of loosening or osseous abnormality.    Assessment:  Status post 2 weeks left TKA    Plan:  Weight bearing as tolerated left Lower extremity  Physical therapy  [employer] antibiotic prophylaxis recommended  Pain medication as needed  Continue aspirin for DVT prophylaxis  Follow-up in 2 months and repeat x-rays left knee

## 2024-10-23 ENCOUNTER — HOME CARE VISIT (OUTPATIENT)
Dept: HOME HEALTH SERVICES | Facility: HOME HEALTHCARE | Age: 69
End: 2024-10-23
Payer: COMMERCIAL

## 2024-10-23 ENCOUNTER — TELEPHONE (OUTPATIENT)
Dept: OBGYN CLINIC | Facility: HOSPITAL | Age: 69
End: 2024-10-23

## 2024-10-23 VITALS — SYSTOLIC BLOOD PRESSURE: 142 MMHG | DIASTOLIC BLOOD PRESSURE: 80 MMHG

## 2024-10-23 PROCEDURE — G0151 HHCP-SERV OF PT,EA 15 MIN: HCPCS

## 2024-10-23 NOTE — TELEPHONE ENCOUNTER
Caller: Noris    Doctor: Lucho    Reason for call: Patient is calling had 10/07/24 Total Knee Replacement, and is still taking vitamins you gave her but will be running out. Asking if she still is required to take them, if so a new prescription will need to be sent to her pharmacy.     Confirmed pharmacy on file.     Call back#: [phone]

## 2024-10-24 ENCOUNTER — APPOINTMENT (OUTPATIENT)
Dept: PHYSICAL THERAPY | Facility: CLINIC | Age: 69
End: 2024-10-24
Payer: COMMERCIAL

## 2024-10-25 ENCOUNTER — HOME CARE VISIT (OUTPATIENT)
Dept: HOME HEALTH SERVICES | Facility: HOME HEALTHCARE | Age: 69
End: 2024-10-25
Payer: COMMERCIAL

## 2024-10-25 VITALS — SYSTOLIC BLOOD PRESSURE: 138 MMHG | DIASTOLIC BLOOD PRESSURE: 66 MMHG

## 2024-10-25 PROCEDURE — G0151 HHCP-SERV OF PT,EA 15 MIN: HCPCS

## 2024-10-28 ENCOUNTER — OFFICE VISIT (OUTPATIENT)
Dept: PHYSICAL THERAPY | Facility: CLINIC | Age: 69
End: 2024-10-28
Payer: COMMERCIAL

## 2024-10-28 DIAGNOSIS — Z96.652 S/P TOTAL KNEE ARTHROPLASTY, LEFT: Primary | ICD-10-CM

## 2024-10-28 PROCEDURE — 97164 PT RE-EVAL EST PLAN CARE: CPT | Performed by: PHYSICAL THERAPIST

## 2024-10-28 PROCEDURE — 97110 THERAPEUTIC EXERCISES: CPT | Performed by: PHYSICAL THERAPIST

## 2024-10-28 NOTE — PROGRESS NOTES
PT Re-Evaluation  Post Op    Today's date: 10/28/2024  Patient name: Noris Heck  : 1955  MRN: [id number]  Referring provider: Miracle Yepez MD  Dx:   Encounter Diagnosis     ICD-10-CM    1. S/P total knee arthroplasty, left  Z96.652           Start Time: 1100  Stop Time: 1145  Total time in clinic (min): 45 minutes    Assessment  Impairments: abnormal gait, abnormal muscle firing, abnormal muscle tone, abnormal or restricted ROM, abnormal movement, activity intolerance, impaired balance, impaired physical strength, lacks appropriate home exercise program, pain with function, weight-bearing intolerance and poor posture     Assessment details: Noris Heck is a pleasant 69 y.o. female who presents s/p L TKR by Dr. Yepez on 10/7/24.  The patient's greatest concerns are fear of not being able to keep active.      No further referral appears necessary at this time based upon examination results.    Primary movement impairment diagnosis of L knee ROM resulting in pathoanatomical symptoms of L TKR. and limiting her ability to drive, exercise or recreation, get out of a chair, lift, perform household chores, sit, sleep, squat to  objects from the floor, stand, and walk.    Impairments include:  1) L knee ROM  2) L knee strength  3) balance and gait    Discussed risks, benefits, and alternatives to treatment, and answered all patient questions to patient satisfaction.  Understanding of Dx/Px/POC: good     Prognosis: good    Goals  Impairment Goals 4-6 weeks  - Decrease pain to <3/10  - Improve knee AROM to equal to the unaffected lower extremity  - Increase knee strength to 4/5 throughout  - Increase hip strength to 4/5 throughout    Functional Goals 6-8 weeks  - Return to Prior Level of Function  - Patient will be independent with HEP  - Patient will be able to squat without increased pain/compensation/difficulty  - Patient will be able to perform sit to stand without increased  pain/compensation/difficulty   - Patient will be able to ascend and descend stairs without increased pain/compensation/difficulty   - Patient will be able to  walk without a cane      Plan  Patient would benefit from: skilled physical therapy  Planned modality interventions: cryotherapy, TENS, thermotherapy: hydrocollator packs and unattended electrical stimulation    Planned therapy interventions: abdominal trunk stabilization, behavior modification, body mechanics training, breathing training, flexibility, functional ROM exercises, home exercise program, joint mobilization, manual therapy, massage, Sherman taping, muscle pump exercises, neuromuscular re-education, patient education, postural training, strengthening, stretching, therapeutic activities, therapeutic exercise, therapeutic training, balance and gait training    Frequency: 2x week  Duration in weeks: 8  Treatment plan discussed with: patient  Plan details: Prognosis above is given PT services 2x/week tapering to 1x/week over the next 2 months and home program adherence.      Subjective Evaluation    History of Present Illness  Date of surgery: 10/7/2024  Mechanism of injury: surgery  Mechanism of injury: HOME LIFE: one floor setup, 2 [place]  PLOF:  uses SPC or FWW with tray in the home.  HISTORY OF CURRENT INJURY:  Patient had L TKR on 10/7/24 by Dr. Yepez. She ended up staying 4 days because of high HR and sugar. She was cleared for blood clots and ECG was normal. She went home and started home PT. She did well with this and was discharged on 10/25/24. She now presents to outpatient PT to continue therapy. She is using the cane for mobility right now, and she feels more stable.   PAIN LOCATION/DESCRIPTORS: L knee, front and back, shin pain also, sensitive to touch  AGGRAVATING FACTORS:  sleeping in bed, sitting too long, bending the knee, standing or walking too long  EASES: ice  DAY PATTERN: worse at night  IMAGING:  IMPRESSION:  Satisfactory  interval left knee arthroplasty as above.  Anatomic alignment without loosening.  Moderate to large effusion.  Skin clips in place anteriorly  SPECIAL QUESTIONS:    Noris denies a new onset of Tingling and Numbness.  PATIENT GOALS: Patient wishes to be able to be pain-free and walk without the cane.     Patient Goals  Patient goals for therapy: decreased pain, increased motion, increased strength, independence with ADLs/IADLs, improved balance and return to sport/leisure activities    Pain  Current pain ratin  At best pain ratin  At worst pain ratin  Location: L knee  Quality: dull ache, discomfort and throbbing  Progression: improved          Objective     Observations   Left Knee   Positive for effusion and incision.     Active Range of Motion   Left Knee   Flexion: 75 degrees with pain  Extension: -10 degrees with pain    Right Knee   Flexion: 122 degrees   Extension: 0 degrees     Strength/Myotome Testing     Left Knee   Flexion: 4-  Extension: 4-  Quadriceps contraction: fair    Right Knee   Flexion: 4+  Extension: 4+  Quadriceps contraction: good    Additional Strength Details  Hip strength L 3+/5 globally, 4-/5 on R      Swelling     Left Knee Girth Measurement (cm)   Joint line: 51 cm    Right Knee Girth Measurement (cm)   Joint line: 44.5 cm    General Comments:      Knee Comments  TU seconds, SCP and hands to stand and sit  30 sec STS: 7 reps using hands  Ambulation: lack of knee flx, holds L hip in ER, uses SPC heavily, slow gait                       POC Expires Auth Status Start Date Expiration Date PT Visit Limit       No auth required         Date             Used             Remaining                Diagnosis: L knee TRK 10/7/24   Precautions:    Primary Goals: 1) L knee ROM  2) L knee strength  3) Hip strength   *asterisks by exercise = given for HEP   Manuals 10/28       Tibiofemoral distraction                                            There Ex        NuStep/Bike for ROM         Heel slides*        Seated heel slides        HS S        Quad S*        Calf S                                   Neuro Re-Ed        Quad set*        SLR*        SL SLR        Clam*        Reverse clam        Bridge        Standing 3 way SLR        Leg press                                            Patient education         Re-evaluation         Ther Act        Sit to stands        Step ups        Modalities        Ice

## 2024-10-30 ENCOUNTER — OFFICE VISIT (OUTPATIENT)
Dept: PHYSICAL THERAPY | Facility: CLINIC | Age: 69
End: 2024-10-30
Payer: COMMERCIAL

## 2024-10-30 DIAGNOSIS — Z96.652 S/P TOTAL KNEE ARTHROPLASTY, LEFT: Primary | ICD-10-CM

## 2024-10-30 PROCEDURE — 97110 THERAPEUTIC EXERCISES: CPT

## 2024-10-30 PROCEDURE — 97140 MANUAL THERAPY 1/> REGIONS: CPT

## 2024-10-30 NOTE — PROGRESS NOTES
"Daily Note     Today's date: 10/30/2024  Patient name: Noris Heck  : 1955  MRN: [id number]  Referring provider: Miracle Yepez MD  Dx:   Encounter Diagnosis     ICD-10-CM    1. S/P total knee arthroplasty, left  Z96.652                      Subjective: Patient states she was unable to sleep last night because of her pain, she iced and elevated. The pain is in her knee and down her shin. SPR /10.       Objective: See treatment diary below      Assessment: Initiated POC as charted below with fair tolerance. She had difficulty bending initially in supine due to pain and muscle guarding. Did better in seated position AROM and with PROM post nustep. Patient demonstrated fatigue post treatment and would benefit from continued PT      Plan: Progress treatment as tolerated.                 POC Expires Auth Status Start Date Expiration Date PT Visit Limit       No auth required         Date             Used             Remaining                Diagnosis: L knee TRK 10/7/24   Precautions:    Primary Goals: 1) L knee ROM  2) L knee strength  3) Hip strength   *asterisks by exercise = given for HEP   Manuals 10/28 11/30      Tibiofemoral distraction         PROM   DELFINA, PTA                                  There Ex        NuStep/Bike for ROM  Nustep 5 min L1      Heel slides*  difficult      Seated heel slides  20x 5 sec       HS S  3x 30 sec      Quad S*        Calf S  Seated with strap 3x 30 sec                                  Neuro Re-Ed        Quad set*  5\"x10       SLR*        SL SLR        Clam*        Reverse clam        Bridge        Standing 3 way SLR        Leg press                                            Patient education         Re-evaluation         Ther Act        Sit to stands  Raised hi-low 10x      Step ups        Modalities        Ice  10 min                                        "

## 2024-10-31 ENCOUNTER — APPOINTMENT (OUTPATIENT)
Dept: PHYSICAL THERAPY | Facility: CLINIC | Age: 69
End: 2024-10-31
Payer: COMMERCIAL

## 2024-11-04 ENCOUNTER — OFFICE VISIT (OUTPATIENT)
Dept: PHYSICAL THERAPY | Facility: CLINIC | Age: 69
End: 2024-11-04
Payer: COMMERCIAL

## 2024-11-04 DIAGNOSIS — Z96.652 S/P TOTAL KNEE ARTHROPLASTY, LEFT: Primary | ICD-10-CM

## 2024-11-04 PROCEDURE — 97110 THERAPEUTIC EXERCISES: CPT | Performed by: PHYSICAL THERAPIST

## 2024-11-04 NOTE — PROGRESS NOTES
"Daily Note     Today's date: 2024  Patient name: Noris Heck  : 1955  MRN: [id number]  Referring provider: Miracle Yepez MD  Dx:   Encounter Diagnosis     ICD-10-CM    1. S/P total knee arthroplasty, left  Z96.652           Start Time: 1050  Stop Time: 1140  Total time in clinic (min): 50 minutes    Subjective: Patient reports she was stiff all weekend and it was not a great weekend. She is not using walker or cane right now.     Objective: See treatment diary below  Temp: 98.7    Assessment: Patient has redness next to incision on the lateral side. PT circled it today with highlighter to allow patient to track it for the day to see if it changes. Entire knee is warm, but patient has no constitutional signs to indicate infection. Patient educated on signs that would require call to MD. Patient able to achieve knee flx of 100 degrees with supine heel slides. Patient also educated that she should be using SPC due to the level of antalgia she is demonstrating right now without an AD. Session today focused on motion.    Plan: Continue per plan of care.                POC Expires Auth Status Start Date Expiration Date PT Visit Limit       No auth required         Date             Used             Remaining                Diagnosis: L knee TRK 10/7/24   Precautions:    Primary Goals: 1) L knee ROM  2) L knee strength  3) Hip strength   *asterisks by exercise = given for HEP   Manuals 10/28 11/30 11/4     Tibiofemoral distraction         PROM   DELFINA, PTA                                  There Ex        NuStep/Bike for ROM  Nustep 5 min L1 NuStep 5 minL 4     Heel slides*  difficult 5 min to 100*     Seated heel slides  20x 5 sec  10x10 sec foot planted, scooting forward and backward     HS S  3x 30 sec 10x10 sec with OP at knee     Quad S*        Calf S  Seated with strap 3x 30 sec  Standing calf S 3x30 sec                                Neuro Re-Ed        Quad set*  5\"x10       SLR*        SL SLR    "     Clam*        Reverse clam        Bridge        Standing 3 way SLR   X 10 lb     Leg press                                            Patient education         Re-evaluation         Ther Act        Sit to stands  Raised hi-low 10x      Step ups        Modalities        Ice  10 min  10 min

## 2024-11-07 ENCOUNTER — OFFICE VISIT (OUTPATIENT)
Dept: PHYSICAL THERAPY | Facility: CLINIC | Age: 69
End: 2024-11-07
Payer: COMMERCIAL

## 2024-11-07 DIAGNOSIS — Z96.652 S/P TOTAL KNEE ARTHROPLASTY, LEFT: Primary | ICD-10-CM

## 2024-11-07 PROCEDURE — 97112 NEUROMUSCULAR REEDUCATION: CPT | Performed by: PHYSICAL THERAPIST

## 2024-11-07 PROCEDURE — 97140 MANUAL THERAPY 1/> REGIONS: CPT | Performed by: PHYSICAL THERAPIST

## 2024-11-07 PROCEDURE — 97110 THERAPEUTIC EXERCISES: CPT | Performed by: PHYSICAL THERAPIST

## 2024-11-07 NOTE — PROGRESS NOTES
Daily Note     Today's date: 2024  Patient name: Noris Heck  : 1955  MRN: [id number]  Referring provider: Miracle Yepez MD  Dx:   Encounter Diagnosis     ICD-10-CM    1. S/P total knee arthroplasty, left  Z96.652           Start Time: 1100  Stop Time: 1152  Total time in clinic (min): 52 minutes    Subjective: Patient reports that she feels a bit better than Monday. She is using the SPC now.     Objective: See treatment diary below    Assessment: Tolerated treatment fair and noted pain in lateral knee with heel slides . PT performed patellar ROM and IASTM of lateral knee which improved symptoms and allowed her to continue the exercise. Patient would benefit from continued PT with focus on ROM and functional strength. She was able to complete SLR without quad lag today and was encouraged to use her muscles rather than her hands when she gets on and off her bed at home.     Plan: Continue per plan of care.                POC Expires Auth Status Start Date Expiration Date PT Visit Limit       No auth required         Date             Used             Remaining                Diagnosis: L knee TRK 10/7/24   Precautions:    Primary Goals: 1) L knee ROM  2) L knee strength  3) Hip strength   *asterisks by exercise = given for HEP   Manuals 10/28 11/30 11/4 11/7    Tibiofemoral distraction         PROM   DELFINA, PTA   IM, PT patellar ROM     IASTM    Lateral knee IM, PT                      There Ex        NuStep/Bike for ROM  Nustep 5 min L1 NuStep 5 minL 4 Recumbent bike rocking for ROM    Heel slides*  difficult 5 min to 100* 5 min to 102*    Seated heel slides  20x 5 sec  10x10 sec foot planted, scooting forward and backward 10x10 sec foot planted, scooting forward and backward    HS S  3x 30 sec 10x10 sec with OP at knee 10x10 sec with OP at knee    Quad S*        Calf S  Seated with strap 3x 30 sec  Standing calf S 3x30 sec Standing calf S 3x30 sec                               Neuro Re-Ed       "  Quad set*  5\"x10   5\" x 10 to -4    SLR*    X 10 with quad set    SL SLR        Clam*        Reverse clam        Bridge        Standing 3 way SLR   X 10 lb X 10 lb    Leg press                                            Patient education         Re-evaluation         Ther Act        Sit to stands  Raised hi-low 10x  Foam on chair x 10    Step ups        Modalities        Ice  10 min  10 min 10 min                                         "

## 2024-11-11 ENCOUNTER — OFFICE VISIT (OUTPATIENT)
Dept: PHYSICAL THERAPY | Facility: CLINIC | Age: 69
End: 2024-11-11
Payer: COMMERCIAL

## 2024-11-11 DIAGNOSIS — Z96.652 S/P TOTAL KNEE ARTHROPLASTY, LEFT: Primary | ICD-10-CM

## 2024-11-11 PROCEDURE — 97110 THERAPEUTIC EXERCISES: CPT

## 2024-11-11 PROCEDURE — 97140 MANUAL THERAPY 1/> REGIONS: CPT

## 2024-11-11 NOTE — PROGRESS NOTES
Daily Note     Today's date: 2024  Patient name: Noris Heck  : 1955  MRN: [id number]  Referring provider: Miracle Yepez MD  Dx:   Encounter Diagnosis     ICD-10-CM    1. S/P total knee arthroplasty, left  Z96.652           Start Time: 1100  Stop Time: 1145  Total time in clinic (min): 45 minutes    Subjective: Patient states she had a rough weekend as far as pain and stiffness. She states today her pain is a little better.       Objective: See treatment diary below      Assessment: Continued with outlined program. Patient has noticeable stiffness with gait and mobility. She was able to perform stretches as noted below to tolerance. She does have some noticeable stiffness with knee flexion. Patient improved with TKE. She was able to increase reps with SLR flexion with quad set. Will progress within her tolerance.       Plan: Continue per plan of care.                POC Expires Auth Status Start Date Expiration Date PT Visit Limit       No auth required         Date             Used             Remaining                Diagnosis: L knee TRK 10/7/24   Precautions:    Primary Goals: 1) L knee ROM  2) L knee strength  3) Hip strength   *asterisks by exercise = given for HEP   Manuals 10/28 11/30 11/4 11/7 11/11   Tibiofemoral distraction         PROM   DELFINA, PTA   IM, PT patellar ROM KK, PTA   Patellar ROM    IASTM    Lateral knee IM, PT Lateral knee   KK, PTA                      There Ex        NuStep/Bike for ROM  Nustep 5 min L1 NuStep 5 minL 4 Recumbent bike rocking for ROM Recumbent bike rocking for ROM 5 min    Heel slides*  difficult 5 min to 100* 5 min to 102* 5 min to 100*    Seated heel slides  20x 5 sec  10x10 sec foot planted, scooting forward and backward 10x10 sec foot planted, scooting forward and backward 10x10 sec foot planted, scooting fwd/bkwd    HS S  3x 30 sec 10x10 sec with OP at knee 10x10 sec with OP at knee 10x10 sec with OP at knee    Quad S*        Calf S  Seated with  "strap 3x 30 sec  Standing calf S 3x30 sec Standing calf S 3x30 sec    ITB S      3x30 sec                      Neuro Re-Ed        Quad set*  5\"x10   5\" x 10 to -4 5 sec x10 -2*   SLR*    X 10 with quad set X14 with quad set   SL SLR        Clam*        Reverse clam        Bridge        Standing 3 way SLR   X 10 lb X 10 lb X10 lb    Leg press                                            Patient education         Re-evaluation         Ther Act        Sit to stands  Raised hi-low 10x  Foam on chair x 10 NV   Step ups        Modalities        Ice  10 min  10 min 10 min defer                                          "

## 2024-11-14 ENCOUNTER — OFFICE VISIT (OUTPATIENT)
Dept: PHYSICAL THERAPY | Facility: CLINIC | Age: 69
End: 2024-11-14
Payer: COMMERCIAL

## 2024-11-14 DIAGNOSIS — Z96.652 S/P TOTAL KNEE ARTHROPLASTY, LEFT: Primary | ICD-10-CM

## 2024-11-14 PROCEDURE — 97112 NEUROMUSCULAR REEDUCATION: CPT

## 2024-11-14 PROCEDURE — 97110 THERAPEUTIC EXERCISES: CPT

## 2024-11-14 PROCEDURE — 97140 MANUAL THERAPY 1/> REGIONS: CPT

## 2024-11-14 NOTE — PROGRESS NOTES
Daily Note     Today's date: 2024  Patient name: Noris Heck  : 1955  MRN: [id number]  Referring provider: Miracle Yepez MD  Dx:   Encounter Diagnosis     ICD-10-CM    1. S/P total knee arthroplasty, left  Z96.652           Start Time: 1055  Stop Time: 1140  Total time in clinic (min): 45 minutes    Subjective: Patient states she has been doing okay. She continues to work on stretching at home but she still has a lot of pain and stiffness.       Objective: See treatment diary below      Assessment: Continued with outlined program. Patient was able to perform ROM exercises as noted but still remains limited mostly with knee flexion due to pain. Trialed several new stretching techniques this visit. She is still able to achieve 100* but will continue to progress as she is able to tolerate. She did well on LP this visit.       Plan: Continue per plan of care.                  POC Expires Auth Status Start Date Expiration Date PT Visit Limit       No auth required         Date             Used             Remaining                Diagnosis: L knee TRK 10/7/24   Precautions:    Primary Goals: 1) L knee ROM  2) L knee strength  3) Hip strength   *asterisks by exercise = given for HEP   Manuals    Tibiofemoral distraction         PROM  KK, *  DELFINA, PTA   IM, PT patellar ROM KK, PTA   Patellar ROM    IASTM    Lateral knee IM, PT Lateral knee   KK, PTA                      There Ex        NuStep/Bike for ROM Recumbent bike for ROM 5 min  Nustep 5 min L1 NuStep 5 minL 4 Recumbent bike rocking for ROM Recumbent bike rocking for ROM 5 min    Heel slides* Manual this visit  difficult 5 min to 100* 5 min to 102* 5 min to 100*    Seated heel slides 10x10 sec foot planted, scooting fwd/bkwd  20x 5 sec  10x10 sec foot planted, scooting forward and backward 10x10 sec foot planted, scooting forward and backward 10x10 sec foot planted, scooting fwd/bkwd    HS S 10x10 sec with OP  "at knee  3x 30 sec 10x10 sec with OP at knee 10x10 sec with OP at knee 10x10 sec with OP at knee    Quad S*        Calf S  Seated with strap 3x 30 sec  Standing calf S 3x30 sec Standing calf S 3x30 sec    ITB S      3x30 sec    Biodex step knee flex S  10x10 sec        Seated EOT S 5 sec x10                         Neuro Re-Ed        Quad set* 5 sec x10 -2*  5\"x10   5\" x 10 to -4 5 sec x10 -2*   SLR* X10    X 10 with quad set X14 with quad set   SL SLR        Clam*        Reverse clam        Bridge        Standing 3 way SLR X12 ea lb   X 10 lb X 10 lb X10 lb    Leg press Seat 5 35# 3x10                                            Patient education         Re-evaluation         Ther Act        Sit to stands 12x foam on chair  Raised hi-low 10x  Foam on chair x 10 NV   Step ups        Modalities        Ice  10 min  10 min 10 min defer                                            "

## 2024-11-18 ENCOUNTER — OFFICE VISIT (OUTPATIENT)
Dept: PHYSICAL THERAPY | Facility: CLINIC | Age: 69
End: 2024-11-18
Payer: COMMERCIAL

## 2024-11-18 DIAGNOSIS — Z96.652 S/P TOTAL KNEE ARTHROPLASTY, LEFT: Primary | ICD-10-CM

## 2024-11-18 PROCEDURE — 97112 NEUROMUSCULAR REEDUCATION: CPT

## 2024-11-18 PROCEDURE — 97140 MANUAL THERAPY 1/> REGIONS: CPT

## 2024-11-18 PROCEDURE — 97110 THERAPEUTIC EXERCISES: CPT

## 2024-11-18 NOTE — PROGRESS NOTES
Daily Note     Today's date: 2024  Patient name: Noris Heck  : 1955  MRN: [id number]  Referring provider: Miracle Yepez MD  Dx:   Encounter Diagnosis     ICD-10-CM    1. S/P total knee arthroplasty, left  Z96.652           Start Time: 1100  Stop Time: 1142  Total time in clinic (min): 42 minutes    Subjective: Patient states she is feeling a little bit better. She states she has the stationary pedals at home and she has been able to go all the way around.       Objective: See treatment diary below      Assessment: Continued with outlined program. Patient was able to go all the way around on the recumbent bike this visit. She has slight improvements with ROM following gaining 4* of knee flexion. She notes some discomfort, but this resolves quickly. She was able to increase reps within tolerance for strengthening exercises. She is slowly progressing towards goals.       Plan: Continue per plan of care.                  POC Expires Auth Status Start Date Expiration Date PT Visit Limit       No auth required         Date             Used             Remaining                Diagnosis: L knee TRK 10/7/24   Precautions:    Primary Goals: 1) L knee ROM  2) L knee strength  3) Hip strength   *asterisks by exercise = given for HEP   Manuals    Tibiofemoral distraction         PROM  KK, *  KK, *  IM, PT patellar ROM KK, PTA   Patellar ROM    IASTM    Lateral knee IM, PT Lateral knee   KK, PTA                      There Ex        NuStep/Bike for ROM Recumbent bike for ROM 5 min  Recumbent bike ROM 5 min  NuStep 5 minL 4 Recumbent bike rocking for ROM Recumbent bike rocking for ROM 5 min    Heel slides* Manual this visit  Manuals this visit  5 min to 100* 5 min to 102* 5 min to 100*    Seated heel slides 10x10 sec foot planted, scooting fwd/bkwd  10x10 sec foot planted, scooting fwd/bkwd 10x10 sec foot planted, scooting forward and backward 10x10 sec foot  "planted, scooting forward and backward 10x10 sec foot planted, scooting fwd/bkwd    HS S 10x10 sec with OP at knee  10x10 sec with OP at knee  10x10 sec with OP at knee 10x10 sec with OP at knee 10x10 sec with OP at knee    Quad S*        Calf S   Standing calf S 3x30 sec Standing calf S 3x30 sec    ITB S      3x30 sec    Biodex step knee flex S  10x10 sec  10x10 sec      Seated EOT S 5 sec x10                         Neuro Re-Ed        Quad set* 5 sec x10 -2*  5 sec x10 -2*   5\" x 10 to -4 5 sec x10 -2*   SLR* X10  X15 with quad set   X 10 with quad set X14 with quad set   SL SLR        Clam*        Reverse clam        Bridge  10x       Standing 3 way SLR X12 ea lb  X15 ea lb X 10 lb X 10 lb X10 lb    Leg press Seat 5 35# 3x10  Seat 3 50# 3x10                                           Patient education         Re-evaluation         Ther Act        Sit to stands 12x foam on chair  15x foam on chair   Foam on chair x 10 NV   Step ups        Modalities        Ice   10 min 10 min defer                                             "

## 2024-11-21 ENCOUNTER — OFFICE VISIT (OUTPATIENT)
Dept: PHYSICAL THERAPY | Facility: CLINIC | Age: 69
End: 2024-11-21
Payer: COMMERCIAL

## 2024-11-21 DIAGNOSIS — Z96.652 S/P TOTAL KNEE ARTHROPLASTY, LEFT: Primary | ICD-10-CM

## 2024-11-21 PROCEDURE — 97140 MANUAL THERAPY 1/> REGIONS: CPT

## 2024-11-21 PROCEDURE — 97112 NEUROMUSCULAR REEDUCATION: CPT

## 2024-11-21 PROCEDURE — 97110 THERAPEUTIC EXERCISES: CPT

## 2024-11-21 NOTE — PROGRESS NOTES
Daily Note     Today's date: 2024  Patient name: Noris Heck  : 1955  MRN: [id number]  Referring provider: Miracle Yepez MD  Dx:   Encounter Diagnosis     ICD-10-CM    1. S/P total knee arthroplasty, left  Z96.652           Start Time: 1100  Stop Time: 1140  Total time in clinic (min): 40 minutes    Subjective: Patient states she is still stiff but she is doing better. She arrives to therapy without her SPC this visit.       Objective: See treatment diary below      Assessment: Continued with outlined program. Patient was able to progress with ROM exercises this visit. She exhibits good technique throughout strengthening exercises. Patient still has slow gait, but this has continued to improve each visit. Will progress as she is able to tolerate.       Plan: Continue per plan of care.                  POC Expires Auth Status Start Date Expiration Date PT Visit Limit       No auth required         Date             Used             Remaining                Diagnosis: L knee TRK 10/7/24   Precautions:    Primary Goals: 1) L knee ROM  2) L knee strength  3) Hip strength   *asterisks by exercise = given for HEP   Manuals    Tibiofemoral distraction         PROM  KK, *  KK, * KK, *  IM, PT patellar ROM KK, PTA   Patellar ROM    IASTM    Lateral knee IM, PT Lateral knee   KK, PTA                      There Ex        NuStep/Bike for ROM Recumbent bike for ROM 5 min  Recumbent bike ROM 5 min  Recumbent bike 5 min ROM  Recumbent bike rocking for ROM Recumbent bike rocking for ROM 5 min    Heel slides* Manual this visit  Manuals this visit  Manuals this visit  5 min to 102* 5 min to 100*    Seated heel slides 10x10 sec foot planted, scooting fwd/bkwd  10x10 sec foot planted, scooting fwd/bkwd 10x10 sec foot planted, scooting, fwd/bkwd 10x10 sec foot planted, scooting forward and backward 10x10 sec foot planted, scooting fwd/bkwd    HS S 10x10 sec with  "OP at knee  10x10 sec with OP at knee  10x10 sec with OP at knee  10x10 sec with OP at knee 10x10 sec with OP at knee    Quad S*        Calf S    Standing calf S 3x30 sec    ITB S      3x30 sec    Biodex step knee flex S  10x10 sec  10x10 sec 10x10 sec      Seated EOT S 5 sec x10                         Neuro Re-Ed        Quad set* 5 sec x10 -2*  5 sec x10 -2*  5 sec x10 -3* 5\" x 10 to -4 5 sec x10 -2*   SLR* X10  X15 with quad set  2x10 with quad set  X 10 with quad set X14 with quad set   SL SLR        Clam*        Reverse clam        Bridge  10x       Standing 3 way SLR X12 ea lb  X15 ea lb OTB 2x10 ea lb  X 10 lb X10 lb    Leg press Seat 5 35# 3x10  Seat 3 50# 3x10  Seat 3 60# 3x10                                          Patient education         Re-evaluation         Ther Act        Sit to stands 12x foam on chair  15x foam on chair   Foam on chair x 10 NV   Step ups   6in 15x fwd/lateral      Modalities        Ice    10 min defer                                              "

## 2024-11-25 ENCOUNTER — OFFICE VISIT (OUTPATIENT)
Dept: PHYSICAL THERAPY | Facility: CLINIC | Age: 69
End: 2024-11-25
Payer: COMMERCIAL

## 2024-11-25 DIAGNOSIS — Z96.652 S/P TOTAL KNEE ARTHROPLASTY, LEFT: Primary | ICD-10-CM

## 2024-11-25 PROCEDURE — 97112 NEUROMUSCULAR REEDUCATION: CPT | Performed by: PHYSICAL THERAPIST

## 2024-11-25 PROCEDURE — 97110 THERAPEUTIC EXERCISES: CPT | Performed by: PHYSICAL THERAPIST

## 2024-11-25 NOTE — PROGRESS NOTES
Daily Note     Today's date: 2024  Patient name: Noris Heck  : 1955  MRN: [id number]  Referring provider: Mriacle Yepez MD  Dx:   Encounter Diagnosis     ICD-10-CM    1. S/P total knee arthroplasty, left  Z96.652           Start Time: 1100  Stop Time: 1145  Total time in clinic (min): 45 minutes    Subjective: Patient reports stiffness in her knee.    Objective: See treatment diary below    Assessment: Tolerated treatment well and was able to tolerate progressions as listed below . Patient exhibited good technique with therapeutic exercises and would benefit from continued PT. She was able to improve to 110 degrees knee flexion during PROM today.    Plan: Progress treatment as tolerated.                   POC Expires Auth Status Start Date Expiration Date PT Visit Limit       No auth required         Date             Used             Remaining                Diagnosis: L knee TRK 10/7/24   Precautions:    Primary Goals: 1) L knee ROM  2) L knee strength  3) Hip strength   *asterisks by exercise = given for HEP   Manuals    Tibiofemoral distraction         PROM  KK, *  KK, * KK, *  IM, PT flx and ext 110* KK, PTA   Patellar ROM    IASTM     Lateral knee   KK, PTA                      There Ex        NuStep/Bike for ROM Recumbent bike for ROM 5 min  Recumbent bike ROM 5 min  Recumbent bike 5 min ROM  Recumbent bike 5 min ROM  Recumbent bike rocking for ROM 5 min    Heel slides* Manual this visit  Manuals this visit  Manuals this visit  manual 5 min to 100*    Seated heel slides 10x10 sec foot planted, scooting fwd/bkwd  10x10 sec foot planted, scooting fwd/bkwd 10x10 sec foot planted, scooting, fwd/bkwd 10x10 sec foot planted, scooting, fwd/bkwd 10x10 sec foot planted, scooting fwd/bkwd    HS S 10x10 sec with OP at knee  10x10 sec with OP at knee  10x10 sec with OP at knee  10x10 sec with OP at knee  10x10 sec with OP at knee    Quad S*         Calf S        ITB S      3x30 sec    Biodex step knee flex S  10x10 sec  10x10 sec 10x10 sec      Seated EOT S 5 sec x10                         Neuro Re-Ed        Quad set* 5 sec x10 -2*  5 sec x10 -2*  5 sec x10 -3* 2x10 3 sec hold 5 sec x10 -2*   SLR* X10  X15 with quad set  2x10 with quad set  2x10 with quad set 1.5# cuff weight X14 with quad set   SL SLR        Clam*        Reverse clam        Bridge  10x       Standing 3 way SLR X12 ea lb  X15 ea lb OTB 2x10 ea lb  2x10 ea lb 1.5# cuff weights - up NV X10 lb    Leg press Seat 5 35# 3x10  Seat 3 50# 3x10  Seat 3 60# 3x10  Seat 3 65# 3x10      LAQ    5# cuff weight 3 sec holds x 20     HS curls standing    1.5# cuff weight x 10                      Patient education         Re-evaluation         Ther Act        Sit to stands 12x foam on chair  15x foam on chair    NV   Step ups   6in 15x fwd/lateral      Modalities        Ice     defer

## 2024-11-27 ENCOUNTER — OFFICE VISIT (OUTPATIENT)
Dept: PHYSICAL THERAPY | Facility: CLINIC | Age: 69
End: 2024-11-27
Payer: COMMERCIAL

## 2024-11-27 DIAGNOSIS — Z96.652 S/P TOTAL KNEE ARTHROPLASTY, LEFT: Primary | ICD-10-CM

## 2024-11-27 PROCEDURE — 97112 NEUROMUSCULAR REEDUCATION: CPT

## 2024-11-27 PROCEDURE — 97140 MANUAL THERAPY 1/> REGIONS: CPT

## 2024-11-27 PROCEDURE — 97110 THERAPEUTIC EXERCISES: CPT

## 2024-11-27 NOTE — PROGRESS NOTES
Daily Note     Today's date: 2024  Patient name: Noris Heck  : 1955  MRN: [id number]  Referring provider: Miracle Yepez MD  Dx:   Encounter Diagnosis     ICD-10-CM    1. S/P total knee arthroplasty, left  Z96.652                      Subjective: Patient states she is sore today.       Objective: See treatment diary below      Assessment: Continued with outlined program.  Patient continues to progress with range of motion despite tightness. She was able to increase reps and resistance as noted with moderate muscle fatigue. She is slowly progressing towards goals. Will progress nv.       Plan: Continue per plan of care.                  POC Expires Auth Status Start Date Expiration Date PT Visit Limit       No auth required         Date             Used             Remaining                Diagnosis: L knee TRK 10/7/24   Precautions:    Primary Goals: 1) L knee ROM  2) L knee strength  3) Hip strength   *asterisks by exercise = given for HEP   Manuals    Tibiofemoral distraction         PROM  KK, *  KK, * KK, *  IM, PT flx and ext 110* KK, PTA flex 111*    IASTM                          There Ex        NuStep/Bike for ROM Recumbent bike for ROM 5 min  Recumbent bike ROM 5 min  Recumbent bike 5 min ROM  Recumbent bike 5 min ROM  Recumbent bike 5 min ROM    Heel slides* Manual this visit  Manuals this visit  Manuals this visit  manual Manual    Seated heel slides 10x10 sec foot planted, scooting fwd/bkwd  10x10 sec foot planted, scooting fwd/bkwd 10x10 sec foot planted, scooting, fwd/bkwd 10x10 sec foot planted, scooting, fwd/bkwd 10x10 sec foot planted, scooting, fwd/bkwd   HS S 10x10 sec with OP at knee  10x10 sec with OP at knee  10x10 sec with OP at knee  10x10 sec with OP at knee  10x10 sec with OP at knee    Quad S*        Calf S        ITB S         Biodex step knee flex S  10x10 sec  10x10 sec 10x10 sec   10x10 sec    Seated EOT S 5 sec  x10                         Neuro Re-Ed        Quad set* 5 sec x10 -2*  5 sec x10 -2*  5 sec x10 -3* 2x10 3 sec hold 2x10 3 sec hold   SLR* X10  X15 with quad set  2x10 with quad set  2x10 with quad set 1.5# cuff weight 2x10 with quad set 1.5# cuff weight    SL SLR        Clam*        Reverse clam        Bridge  10x       Standing 3 way SLR X12 ea bl  X15 ea lb OTB 2x10 ea lb  2x10 ea lb 1.5# cuff weights - up NV 2x10 ea lb   2# cuff weights    Leg press Seat 5 35# 3x10  Seat 3 50# 3x10  Seat 3 60# 3x10  Seat 3 65# 3x10  Seat 3 75# 3x10     LAQ    5# cuff weight 3 sec holds x 20     HS curls standing    1.5# cuff weight x 10 2# cuff weight 2x10                      Patient education         Re-evaluation         Ther Act        Sit to stands 12x foam on chair  15x foam on chair    2x10 foam on chair    Step ups   6in 15x fwd/lateral      Modalities        Ice

## 2024-12-02 ENCOUNTER — OFFICE VISIT (OUTPATIENT)
Dept: PHYSICAL THERAPY | Facility: CLINIC | Age: 69
End: 2024-12-02
Payer: COMMERCIAL

## 2024-12-02 DIAGNOSIS — Z96.652 S/P TOTAL KNEE ARTHROPLASTY, LEFT: Primary | ICD-10-CM

## 2024-12-02 PROCEDURE — 97112 NEUROMUSCULAR REEDUCATION: CPT

## 2024-12-02 PROCEDURE — 97140 MANUAL THERAPY 1/> REGIONS: CPT

## 2024-12-02 PROCEDURE — 97110 THERAPEUTIC EXERCISES: CPT

## 2024-12-02 NOTE — PROGRESS NOTES
Daily Note     Today's date: 2024  Patient name: Noris Heck  : 1955  MRN: [id number]  Referring provider: Miracle Yepez MD  Dx:   Encounter Diagnosis     ICD-10-CM    1. S/P total knee arthroplasty, left  Z96.652           Start Time: 1100  Stop Time: 1144  Total time in clinic (min): 44 minutes    Subjective: Patient states she was in pain following last session and through the weekend following gaining an additional degree of motion.       Objective: See treatment diary below      Assessment: Continued with outlined program. Patient able to achieve 111* this visit following self stretching and OP without pain. She did well with strengthening exercises as well. Progressed patient with 8in step ups. She notes feeling much better than last session. She is slowly progressing towards goals.       Plan: Continue per plan of care.                  POC Expires Auth Status Start Date Expiration Date PT Visit Limit       No auth required         Date             Used             Remaining                Diagnosis: L knee TRK 10/7/24   Precautions:    Primary Goals: 1) L knee ROM  2) L knee strength  3) Hip strength   *asterisks by exercise = given for HEP   Manuals    Tibiofemoral distraction         PROM  KK, * KK, * KK, *  IM, PT flx and ext 110* KK, PTA flex 111*    IASTM                          There Ex        NuStep/Bike for ROM Recumbent bike for ROM 5 min  Recumbent bike ROM 5 min  Recumbent bike 5 min ROM  Recumbent bike 5 min ROM  Recumbent bike 5 min ROM    Heel slides* 5 min  Manuals this visit  Manuals this visit  manual    Seated heel slides 10x10 sec foot planted, scooting fwd/bkwd  10x10 sec foot planted, scooting fwd/bkwd 10x10 sec foot planted, scooting, fwd/bkwd 10x10 sec foot planted, scooting, fwd/bkwd    HS S 10x10 sec with OP at knee  10x10 sec with OP at knee  10x10 sec with OP at knee  10x10 sec with OP at knee     Quad S*         Calf S        ITB S         Biodex step knee flex S  10x10 sec  10x10 sec 10x10 sec      Seated EOT S                         Neuro Re-Ed        Quad set*  5 sec x10 -2*  5 sec x10 -3* 2x10 3 sec hold    SLR* 1.5# 2x10 cuff weight  X15 with quad set  2x10 with quad set  2x10 with quad set 1.5# cuff weight    SL SLR        Clam*        Reverse clam        Bridge  10x       Standing 3 way SLR 2# 2x10 ea lb  X15 ea lb OTB 2x10 ea lb  2x10 ea lb 1.5# cuff weights - up NV    Leg press Seat 3 90# 3x10  Seat 3 50# 3x10  Seat 3 60# 3x10  Seat 3 65# 3x10  Seat 3 90# 3x10     LAQ    5# cuff weight 3 sec holds x 20     HS curls standing    1.5# cuff weight x 10                      Patient education         Re-evaluation         Ther Act        Sit to stands  15x foam on chair       Step ups 8in 2x10 fwd(biodex step)   6in 15x fwd/lateral      Modalities        Ice

## 2024-12-04 ENCOUNTER — OFFICE VISIT (OUTPATIENT)
Dept: PHYSICAL THERAPY | Facility: CLINIC | Age: 69
End: 2024-12-04
Payer: COMMERCIAL

## 2024-12-04 DIAGNOSIS — Z96.652 S/P TOTAL KNEE ARTHROPLASTY, LEFT: Primary | ICD-10-CM

## 2024-12-04 PROCEDURE — 97112 NEUROMUSCULAR REEDUCATION: CPT

## 2024-12-04 PROCEDURE — 97110 THERAPEUTIC EXERCISES: CPT

## 2024-12-04 NOTE — PROGRESS NOTES
Daily Note     Today's date: 2024  Patient name: Noris Heck  : 1955  MRN: [id number]  Referring provider: Miracle Yepez MD  Dx:   Encounter Diagnosis     ICD-10-CM    1. S/P total knee arthroplasty, left  Z96.652           Start Time: 1103  Stop Time: 1145  Total time in clinic (min): 42 minutes    Subjective: Patient states she felt better following last session. She states she was able to go shopping yesterday. She required seated rest breaks but overall did well.       Objective: See treatment diary below      Assessment: Continued with outlined program. Patient was able to perform ROM exercises well this visit. She does have noticeable stiffness and swelling in L knee, but continues to make small gains each session. She is progressing well towards goals.       Plan: Continue per plan of care.                  POC Expires Auth Status Start Date Expiration Date PT Visit Limit       No auth required         Date             Used             Remaining                Diagnosis: L knee TRK 10/7/24   Precautions:    Primary Goals: 1) L knee ROM  2) L knee strength  3) Hip strength   *asterisks by exercise = given for HEP   Manuals    Tibiofemoral distraction         PROM  KK, * KK, PTA  KK, *  IM, PT flx and ext 110* KK, PTA flex 111*    IASTM                          There Ex        NuStep/Bike for ROM Recumbent bike for ROM 5 min  Recumbent bike for ROM 5 min Recumbent bike 5 min ROM  Recumbent bike 5 min ROM  Recumbent bike 5 min ROM    Heel slides* 5 min  5 min 104* Manuals this visit  manual    Seated heel slides 10x10 sec foot planted, scooting fwd/bkwd   10x10 sec foot planted, scooting, fwd/bkwd 10x10 sec foot planted, scooting, fwd/bkwd    HS S 10x10 sec with OP at knee  10x10 sec with OP 10x10 sec with OP at knee  10x10 sec with OP at knee     Quad S*        Calf S        ITB S         Biodex step knee flex S  10x10 sec  10x10 sec 10x10 sec       Seated EOT S                         Neuro Re-Ed        Quad set*   5 sec x10 -3* 2x10 3 sec hold    SLR* 1.5# 2x10 cuff weight  2# 3x10 cuff weight 2x10 with quad set  2x10 with quad set 1.5# cuff weight    SL SLR        Clam*        Reverse clam        Bridge        Standing 3 way SLR 2# 2x10 ea lb  2# 2x10 ea lb OTB 2x10 ea lb  2x10 ea lb 1.5# cuff weights - up NV    Leg press Seat 3 90# 3x10  Seat 2 90# 5x10  Seat 3 60# 3x10  Seat 3 65# 3x10  Seat 3 90# 3x10     LAQ    5# cuff weight 3 sec holds x 20     HS curls standing    1.5# cuff weight x 10    SLS   30 sec x2               Patient education         Re-evaluation         Ther Act        Sit to stands        Step ups 8in 2x10 fwd(biodex step)  8in 2x10 fwd (biodex step)    Lateral NV  6in 15x fwd/lateral      Modalities        Ice

## 2024-12-09 ENCOUNTER — EVALUATION (OUTPATIENT)
Dept: PHYSICAL THERAPY | Facility: CLINIC | Age: 69
End: 2024-12-09
Payer: COMMERCIAL

## 2024-12-09 DIAGNOSIS — Z96.652 S/P TOTAL KNEE ARTHROPLASTY, LEFT: Primary | ICD-10-CM

## 2024-12-09 PROCEDURE — 97112 NEUROMUSCULAR REEDUCATION: CPT | Performed by: PHYSICAL THERAPIST

## 2024-12-09 NOTE — PROGRESS NOTES
PT Re-Evaluation     Today's date: 2024  Patient name: Noris Heck  : 1955  MRN: [id number]  Referring provider: Miracle Yepez MD  Dx:   Encounter Diagnosis     ICD-10-CM    1. S/P total knee arthroplasty, left  Z96.652             Start Time: 1100  Stop Time: 1143  Total time in clinic (min): 43 minutes    Assessment  Impairments: abnormal gait, abnormal muscle firing, abnormal muscle tone, abnormal or restricted ROM, abnormal movement, activity intolerance, impaired balance, impaired physical strength, lacks appropriate home exercise program, pain with function, weight-bearing intolerance and poor posture     Assessment details: Noris Heck is a pleasant 69 y.o. female who presents to RE s/p L TKR by Dr. Yepez on 10/7/24.  Her PROM and AROM have greatly improved, and she demonstrates functional strength and balance. She does still have limited knee flexion that impacts her stair climbing and gait. She would benefit from continued PT for 2 more weeks then DC to HEP.    Primary movement impairment diagnosis of L knee ROM resulting in pathoanatomical symptoms of L TKR. and limiting her ability to drive, exercise or recreation, get out of a chair, lift, perform household chores, sit, sleep, squat to  objects from the floor, stand, and walk.    Impairments include:  1) L knee ROM  2) L knee strength  3) balance and gait    Discussed risks, benefits, and alternatives to treatment, and answered all patient questions to patient satisfaction.  Understanding of Dx/Px/POC: good     Prognosis: good    Goals  Impairment Goals 4-6 weeks  - Decrease pain to <3/10 - met  - Improve knee AROM to equal to the unaffected lower extremity - partially met  - Increase knee strength to 4/5 throughout - met  - Increase hip strength to 4/5 throughout    Functional Goals 6-8 weeks  - Return to Prior Level of Function - partially met  - Patient will be independent with HEP - met  - Patient will be able  to squat without increased pain/compensation/difficulty - met  - Patient will be able to perform sit to stand without increased pain/compensation/difficulty - met   - Patient will be able to ascend and descend stairs without increased pain/compensation/difficulty - met  - Patient will be able to walk without a cane - met      Plan  Planned modality interventions: cryotherapy    Planned therapy interventions: breathing training, flexibility, functional ROM exercises, home exercise program, neuromuscular re-education, patient education, postural training, strengthening, stretching, therapeutic activities, therapeutic exercise, therapeutic training, gait training, muscle pump exercises and balance    Frequency: 2x week  Duration in weeks: 2  Treatment plan discussed with: patient  Plan details: Prognosis above is given PT services 2x/week tapering to 1x/week over the next month and home program adherence.      Subjective Evaluation    History of Present Illness  Date of surgery: 10/7/2024  Mechanism of injury: surgery  Mechanism of injury: HOME LIFE: one floor setup, 2 BERHANE  PLOF:  uses SPC or FWW with tray in the home.  HISTORY OF CURRENT INJURY:  Patient had L TKR on 10/7/24 by Dr. Yepez. She ended up staying 4 days because of high HR and sugar. She was cleared for blood clots and ECG was normal. She went home and started home PT. She did well with this and was discharged on 10/25/24. She now presents to outpatient PT to continue therapy. She is using the cane for mobility right now, and she feels more stable.     Update:  Patient notes a big improvement in her knee. She is not limping as much and has less pain. She has most of her pain in PT when she is being stretched but this subsides quickly. She has some swelling in ankle and pain in her knee if she is walking or standing for hours.  PAIN LOCATION/DESCRIPTORS: L knee lateral side, sensitive/numb still  AGGRAVATING FACTORS:  sleeping in bed, sitting a long  time, prolonged walking  Improved: sleeping in bed, bending the knee, standing or walking, steps into the home, getting in and out of the car  EASES: ice  DAY PATTERN: worse at night  IMAGING:  IMPRESSION:  Satisfactory interval left knee arthroplasty as above.  Anatomic alignment without loosening.  Moderate to large effusion.  Skin clips in place anteriorly  SPECIAL QUESTIONS:    Noris denies a new onset of Tingling and Numbness.  PATIENT GOALS: Patient wishes to be able to be pain-free and walk without the cane. - Patient rates herself at 80% improved    Patient Goals  Patient goals for therapy: decreased pain, increased motion, increased strength, independence with ADLs/IADLs, improved balance and return to sport/leisure activities    Pain  Current pain ratin  At best pain ratin  At worst pain ratin  Location: L knee  Quality: dull ache, discomfort and tight  Progression: improved          Objective     Observations   Left Knee   Positive for incision. Negative for effusion.     Additional Observation Details  Incision well-healed    Active Range of Motion   Left Knee   Flexion: 105 degrees   Extension: -5 degrees     Right Knee   Flexion: 122 degrees   Extension: 0 degrees     Passive Range of Motion   Left Knee   Flexion: 111 degrees with pain  Extension: -2 degrees     Strength/Myotome Testing     Left Knee   Flexion: 4+  Extension: 4  Quadriceps contraction: good    Right Knee   Flexion: 4+  Extension: 4+  Quadriceps contraction: good    Additional Strength Details  Hip strength L 4-/5 globally, 4-/5 on R      Swelling     Left Knee Girth Measurement (cm)   Joint line: 52 cm    Right Knee Girth Measurement (cm)   Joint line: 47 cm    General Comments:      Knee Comments  TU seconds, no hands to stand  30 sec STS: 10 no hands  Ambulation: slightly antalgic, equal use of legs                       POC Expires Auth Status Start Date Expiration Date PT Visit Limit       No auth required          Date             Used             Remaining                Diagnosis: L knee TRK 10/7/24   Precautions:    Primary Goals: 1) L knee ROM  2) L knee strength  3) Hip strength   *asterisks by exercise = given for HEP   Manuals 12/2 12/4 12/9 11/25 11/27   Tibiofemoral distraction         PROM  KK, * KK, PTA  IM, * IM, PT flx and ext 110* KK, PTA flex 111*    IASTM                          There Ex        NuStep/Bike for ROM Recumbent bike for ROM 5 min  Recumbent bike for ROM 5 min Recumbent bike for ROM 5 min Recumbent bike 5 min ROM  Recumbent bike 5 min ROM    Heel slides* 5 min  5 min 104* 5 min 105* manual    Seated heel slides 10x10 sec foot planted, scooting fwd/bkwd    10x10 sec foot planted, scooting, fwd/bkwd    HS S 10x10 sec with OP at knee  10x10 sec with OP  10x10 sec with OP at knee     Quad S*        Calf S        ITB S         Biodex step knee flex S  10x10 sec  10x10 sec      Seated EOT S                         Neuro Re-Ed        Quad set*    2x10 3 sec hold    SLR* 1.5# 2x10 cuff weight  2# 3x10 cuff weight  2x10 with quad set 1.5# cuff weight    SL SLR        Clam*        Reverse clam        Bridge        Standing 3 way SLR 2# 2x10 ea lb  2# 2x10 ea lb  2x10 ea lb 1.5# cuff weights - up NV    Leg press Seat 3 90# 3x10  Seat 2 90# 5x10   Seat 3 65# 3x10  Seat 3 90# 3x10     LAQ    5# cuff weight 3 sec holds x 20     HS curls standing    1.5# cuff weight x 10    SLS   30 sec x2               Patient education         Re-evaluation   IM, PT      Ther Act        Sit to stands        Step ups 8in 2x10 fwd(biodex step)  8in 2x10 fwd (biodex step)    Lateral NV       Modalities        Ice

## 2024-12-11 ENCOUNTER — OFFICE VISIT (OUTPATIENT)
Dept: PHYSICAL THERAPY | Facility: CLINIC | Age: 69
End: 2024-12-11
Payer: COMMERCIAL

## 2024-12-11 DIAGNOSIS — Z96.652 S/P TOTAL KNEE ARTHROPLASTY, LEFT: Primary | ICD-10-CM

## 2024-12-11 PROCEDURE — 97110 THERAPEUTIC EXERCISES: CPT

## 2024-12-11 PROCEDURE — 97112 NEUROMUSCULAR REEDUCATION: CPT

## 2024-12-11 NOTE — PROGRESS NOTES
Daily Note     Today's date: 2024  Patient name: Noris Heck  : 1955  MRN: [id number]  Referring provider: Miracle Yepez MD  Dx:   Encounter Diagnosis     ICD-10-CM    1. S/P total knee arthroplasty, left  Z96.652           Start Time: 1100  Stop Time: 1140  Total time in clinic (min): 40 minutes    Subjective: Patient states she is doing well. She states she does have more swelling when she stands for a while.       Objective: See treatment diary below      Assessment: Continued with outlined program. Patient has noticeable improvements with ROM as she is able to perform functional tasks with less compensations. She was able to progress with strengthening exercises as noted. She is slowly progressing towards goals.       Plan: Continue per plan of care.                  POC Expires Auth Status Start Date Expiration Date PT Visit Limit       No auth required         Date             Used             Remaining                Diagnosis: L knee TRK 10/7/24   Precautions:    Primary Goals: 1) L knee ROM  2) L knee strength  3) Hip strength   *asterisks by exercise = given for HEP   Manuals    Tibiofemoral distraction         PROM  KK, * KK, PTA  IM, * KK, * KK, PTA flex 111*    IASTM                          There Ex        NuStep/Bike for ROM Recumbent bike for ROM 5 min  Recumbent bike for ROM 5 min Recumbent bike for ROM 5 min Recumbent bike for ROM 5 min Recumbent bike 5 min ROM    Heel slides* 5 min  5 min 104* 5 min 105* 3 min 109*     Seated heel slides 10x10 sec foot planted, scooting fwd/bkwd        HS S 10x10 sec with OP at knee  10x10 sec with OP      Quad S*        Calf S        ITB S         Biodex step knee flex S  10x10 sec  10x10 sec  10x10 sec     Seated EOT S                         Neuro Re-Ed        Quad set*        SLR* 1.5# 2x10 cuff weight  2# 3x10 cuff weight  2# 3x10 cuff weight    SL SLR        Clam*        Reverse clam         Bridge        Standing 3 way SLR 2# 2x10 ea lb  2# 2x10 ea lb  2# 2x10 ea lb    Leg press Seat 3 90# 3x10  Seat 2 90# 5x10   Seat 1 90# 5x10  100# 2x10  Seat 3 90# 3x10     LAQ         HS curls standing        SLS   30 sec x2  30 sec x2              Patient education         Re-evaluation   IM, PT      Ther Act        Sit to stands        Step ups 8in 2x10 fwd(biodex step)  8in 2x10 fwd (biodex step)    Lateral NV   8in 2x10 fwd biodex step    10x lateral     Modalities        Ice

## 2024-12-16 ENCOUNTER — OFFICE VISIT (OUTPATIENT)
Dept: PHYSICAL THERAPY | Facility: CLINIC | Age: 69
End: 2024-12-16
Payer: COMMERCIAL

## 2024-12-16 DIAGNOSIS — Z96.652 S/P TOTAL KNEE ARTHROPLASTY, LEFT: Primary | ICD-10-CM

## 2024-12-16 PROCEDURE — 97112 NEUROMUSCULAR REEDUCATION: CPT

## 2024-12-16 PROCEDURE — 97140 MANUAL THERAPY 1/> REGIONS: CPT

## 2024-12-16 PROCEDURE — 97110 THERAPEUTIC EXERCISES: CPT

## 2024-12-16 NOTE — PROGRESS NOTES
Daily Note     Today's date: 2024  Patient name: Noris Heck  : 1955  MRN: [id number]  Referring provider: Miracle Yepez MD  Dx:   Encounter Diagnosis     ICD-10-CM    1. S/P total knee arthroplasty, left  Z96.652           Start Time: 1100  Stop Time: 1145  Total time in clinic (min): 45 minutes    Subjective: Patient states she is sore and stiff today. She is not sure if this is weather related.       Objective: See treatment diary below      Assessment: Continued with outlined program. Patient was able to perform ROM and strengthening exercises, but does have some signs of soreness and distress. She did complete full session. Patient lost a few degrees this visit, but she will continue to stretch while at home. She has done well overall and continues to progress with overall function.       Plan: Continue per plan of care.                  POC Expires Auth Status Start Date Expiration Date PT Visit Limit       No auth required         Date             Used             Remaining                Diagnosis: L knee TRK 10/7/24   Precautions:    Primary Goals: 1) L knee ROM  2) L knee strength  3) Hip strength   *asterisks by exercise = given for HEP   Manuals    Tibiofemoral distraction         PROM  KK, * KK, PTA  IM, * KK, * KK, *    IASTM                          There Ex        NuStep/Bike for ROM Recumbent bike for ROM 5 min  Recumbent bike for ROM 5 min Recumbent bike for ROM 5 min Recumbent bike for ROM 5 min Recumbent bike for ROM 5 min    Heel slides* 5 min  5 min 104* 5 min 105* 3 min 109*  3 min 103*    Seated heel slides 10x10 sec foot planted, scooting fwd/bkwd        HS S 10x10 sec with OP at knee  10x10 sec with OP      Quad S*        Calf S        ITB S         Biodex step knee flex S  10x10 sec  10x10 sec  10x10 sec  10x10 sec    Seated EOT S                         Neuro Re-Ed        Quad set*        SLR* 1.5# 2x10 cuff  weight  2# 3x10 cuff weight  2# 3x10 cuff weight    SL SLR        Clam*        Reverse clam        Bridge        Standing 3 way SLR 2# 2x10 ea lb  2# 2x10 ea lb  2# 2x10 ea lb    Leg press Seat 3 90# 3x10  Seat 2 90# 5x10   Seat 1 90# 5x10  100# 2x10  Seat 1   100# 4x10    LAQ         HS curls standing        SLS   30 sec x2  30 sec x2              Patient education         Re-evaluation   IM, PT      Ther Act        Sit to stands        Step ups 8in 2x10 fwd(biodex step)  8in 2x10 fwd (biodex step)    Lateral NV   8in 2x10 fwd biodex step    10x lateral  8in 2x10 fwd biodex step     10x lateral    Modalities        Ice

## 2024-12-17 ENCOUNTER — OFFICE VISIT (OUTPATIENT)
Dept: OBGYN CLINIC | Facility: CLINIC | Age: 69
End: 2024-12-17

## 2024-12-17 ENCOUNTER — HOSPITAL ENCOUNTER (OUTPATIENT)
Dept: RADIOLOGY | Facility: HOSPITAL | Age: 69
Discharge: HOME | End: 2024-12-17
Attending: ORTHOPAEDIC SURGERY
Payer: COMMERCIAL

## 2024-12-17 DIAGNOSIS — M79.672 PAIN OF LEFT MIDFOOT: ICD-10-CM

## 2024-12-17 DIAGNOSIS — M79.671 PAIN OF MIDFOOT, RIGHT: ICD-10-CM

## 2024-12-17 DIAGNOSIS — Z96.652 S/P TOTAL KNEE REPLACEMENT USING CEMENT, LEFT: Primary | ICD-10-CM

## 2024-12-17 DIAGNOSIS — Z96.652 S/P TOTAL KNEE REPLACEMENT USING CEMENT, LEFT: ICD-10-CM

## 2024-12-17 PROCEDURE — 99024 POSTOP FOLLOW-UP VISIT: CPT | Performed by: ORTHOPAEDIC SURGERY

## 2024-12-17 PROCEDURE — 73562 X-RAY EXAM OF KNEE 3: CPT

## 2024-12-17 RX ORDER — AMOXICILLIN 500 MG/1
CAPSULE ORAL
Qty: 4 CAPSULE | Refills: 3 | Status: SHIPPED | OUTPATIENT
Start: 2024-12-17 | End: 2024-12-31

## 2024-12-17 NOTE — PATIENT INSTRUCTIONS
Noris Heck  1955    [facility]                                                                                               Dr. Miracle Yepez                                                                                                            ANTIBIOTIC USE FOR JOINT REPLACEMENT PATIENTS  You have had surgery to replace one of your joints with a metal prosthesis. Going forward, you should take oral antibiotics before any dental work, including routine cleanings. These procedures are a potential source of injection. If you develop an infection, it could spread to your operative joint and cause complications.  Please show the following guidelines to your medical doctor and dentist, so he/she can prescribe the appropriate medications.  The following information is recommended by the American [employer], [employer], and Orthopedic Associations:  STANDARD GENERAL PROPHYLAXIS  antibiotic prophylaxis recommended lifetime  Recommend refraining from dental procedures until 3 months post operatively  Amoxicillin for Adults:    500mg - Take 4 capsules (2 grams) orally one hour before the procedure  If allergic to Penicillin  Clindamycin for Adults:   300mg - Take 2 capsules (600 mg) orally one hour before the procedure  Azithromycin for Adults:  250mg - Take 2 capsules (500 mg) orally one hour before the procedure  Cephalexin for Adults:     500mg - Take 4 capsules (2 grams) orally one hour before the procedure  Note: Cephalosporins should not be used if you have ever developed an immediate hypersensitivity reaction (i.e., hives, swelling, severe itching, difficulty breathing) to Penicillin  Please feel free to contact our office at [phone] with questions or concerns.

## 2024-12-17 NOTE — PROGRESS NOTES
Assessment:  1. S/P total knee replacement using cement, left  XR knee 3 vw left non injury          Plan:    Patient is 10 weeks status post left total knee arthroplasty performed on 10/7/2024  Weightbearing as tolerated  X-ray of the left knee was reviewed in the office today  Patient is aware she will be on lifetime antibiotics for DVT prophylaxis.  Amoxicillin was prescribed today and was sent to the pharmacy.  Patient with Tubigrip to help decrease swelling in the left lower extremity  Right of the knee at the next office visit  Patient is to follow-up in 9 months        The above stated was discussed in layman's terms and the patient expressed understanding.  All questions were answered to the patient's satisfaction.         Subjective:   Noris Heck is a 69 y.o. female who presents today 10 weeks status post left total knee arthroplasty performed on 10/7/2024.  She is doing well. She notes some numbness over lateral knee. She is currently in therapy and is tolerating the sessions well.  She notes some discomfort with prolonged sitting and going from sitting to standing.  Does states she has swelling of the left ankle at times.      Review of systems negative unless otherwise specified in HPI    Past Medical History:   Diagnosis Date    Arthritis     Diabetes mellitus (HCC)     Hyperlipidemia     Hypertension        Past Surgical History:   Procedure Laterality Date    ANKLE FUSION Right     Patient states about 4 surgeries on ankle    CERVICAL POLYPECTOMY      COLONOSCOPY      DILATION AND CURETTAGE OF UTERUS      VA ARTHRP KNE CONDYLE&PLATU MEDIAL&LAT COMPARTMENTS Left 10/7/2024    Procedure: ARTHROPLASTY KNEE TOTAL;  Surgeon: Miracle Yepez MD;  Location: [facility];  Service: Orthopedics    THYROID SURGERY      nodule removed       Family History   Problem Relation Age of Onset    Liver disease Mother     Aortic stenosis Father     Breast cancer Sister     Uterine cancer Paternal Grandmother         Social History     Occupational History    Not on file   Tobacco Use    Smoking status: Never    Smokeless tobacco: Never   Vaping Use    Vaping status: Never Used   Substance and Sexual Activity    Alcohol use: Yes     Comment: socially    Drug use: Never    Sexual activity: Yes     Partners: Male         Current Outpatient Medications:     ascorbic acid (VITAMIN C) 500 MG tablet, Take 1 tablet (500 mg total) by mouth 2 (two) times a day, Disp: 60 tablet, Rfl: 1    aspirin (ECOTRIN LOW STRENGTH) 81 mg EC tablet, Take 1 tablet (81 mg total) by mouth 2 (two) times a day Take 1(one) 81 mg tablet twice daily x 35 days after total joint arthroplasty, Disp: 70 tablet, Rfl: 0    Cholecalciferol 25 MCG (1000 UT) tablet, Take 1,000 Units by mouth, Disp: , Rfl:     clotrimazole-betamethasone (LOTRISONE) 1-0.05 % cream, Apply topically 2 (two) times a day (Patient taking differently: Apply 2 Applications topically if needed (Yeast infection) use as needed under breasts if rash.), Disp: 15 g, Rfl: 1    docusate sodium (COLACE) 100 mg capsule, Take 1 capsule (100 mg total) by mouth 2 (two) times a day, Disp: 10 capsule, Rfl: 0    ferrous sulfate 324 (65 Fe) mg, Take 1 tablet (324 mg total) by mouth 2 (two) times a day before meals, Disp: 60 tablet, Rfl: 1    folic acid (FOLVITE) 1 mg tablet, Take 1 tablet (1 mg total) by mouth daily, Disp: 30 tablet, Rfl: 1    gabapentin (NEURONTIN) 100 mg capsule, Take 1 capsule (100 mg total) by mouth daily at bedtime, Disp: 30 capsule, Rfl: 0    metFORMIN (GLUCOPHAGE) 1000 MG tablet, Take 1 tablet by mouth 2 (two) times a day with meals, Disp: , Rfl:     methocarbamol (ROBAXIN) 750 mg tablet, Take 1 tablet (750 mg total) by mouth every 6 (six) hours for 7 days, Disp: 28 tablet, Rfl: 0    Multiple Vitamins-Minerals (multivitamin with minerals) tablet, TAKE 1 TABLET BY MOUTH EVERY DAY, Disp: 30 tablet, Rfl: 0    Multiple Vitamins-Minerals (PRESERVISION AREDS PO), Take 1 tablet by mouth 2  (two) times a day Not taking, Disp: , Rfl:     ondansetron (ZOFRAN) 4 mg tablet, Take 1 tablet (4 mg total) by mouth every 8 (eight) hours as needed for nausea or vomiting, Disp: 5 tablet, Rfl: 0    oxyCODONE (Roxicodone) 5 immediate release tablet, Take 1 tablet (5 mg total) by mouth every 4 (four) hours as needed for severe pain Max Daily Amount: 30 mg (Patient not taking: Reported on 10/22/2024), Disp: 28 tablet, Rfl: 0    rosuvastatin (CRESTOR) 10 MG tablet, Take 1 tablet by mouth daily at bedtime, Disp: , Rfl:     semaglutide, 1 mg/dose, (Ozempic, 1 MG/DOSE,) 4 mg/3 mL injection pen, Inject 0.75 mL (1 mg total) under the skin every 7 days, Disp: , Rfl:     TiZANidine (ZANAFLEX) 4 MG capsule, Take 1 capsule (4 mg total) by mouth 3 (three) times a day as needed for muscle spasms, Disp: 30 capsule, Rfl: 0    Allergies   Allergen Reactions    Naproxen Other (See Comments)     angioedema            There were no vitals filed for this visit.  There is no height or weight on file to calculate BMI.  Wt Readings from Last 3 Encounters:   10/22/24 99.8 kg (220 lb)   10/10/24 99.8 kg (220 lb)   06/27/24 105 kg (232 lb 3.2 oz)       Objective:            Physical Exam  Physical Exam:      General Appearance:    Alert, cooperative, no distress, appears stated age   Head:    Normocephalic, without obvious abnormality, atraumatic   Eyes:    conjunctiva/corneas clear, both eyes         Nose:   Nares normal, septum midline, no drainage    Throat:   Lips normal; teeth and gums normal   Neck:    symmetrical, trachea midline, ;     thyroid:  no enlargement/   Back:     Symmetric, no curvature, ROM normal   Lungs:   No audible wheezing or labored breathing   Chest Wall:    No tenderness or deformity    Heart:    Regular rate and rhythm                         Pulses:   2+ and symmetric all extremities   Skin:   Skin color, texture, turgor normal, no rashes or lesions   Neurologic:   normal strength, sensation and reflexes      "throughout                       Ortho Exam  Left Knee  Surgical incision well healed   No erythema or open wounds  Mild effusion  'no Tenderness palpation of medial joint line  No lateral joint line tenderness  Near full extension  Full flexion  Patellar grind test -  Stable to varus and valgus stress  Negative posterior drawer  Negative Lachman test  Neurovascular intact distally       Diagnostics, reviewed and taken today if performed as documented:      The attending physician has personally reviewed the pertinent films in PACS and interpretation is as follows: X-ray left knee demonstrates status post TKA alkaline implant with no sign of loosening      Procedures, if performed today:    Procedures    None performed      Scribe Attestation      I,:  Awilda Bowen MA am acting as a scribe while in the presence of the attending physician.:       I,:  Miracle Yepez MD personally performed the services described in this documentation    as scribed in my presence.:               Portions of the record may have been created with voice recognition software.  Occasional wrong word or \"sound a like\" substitutions may have occurred due to the inherent limitations of voice recognition software.  Read the chart carefully and recognize, using context, where substitutions have occurred.  "

## 2024-12-18 ENCOUNTER — OFFICE VISIT (OUTPATIENT)
Dept: PHYSICAL THERAPY | Facility: CLINIC | Age: 69
End: 2024-12-18
Payer: COMMERCIAL

## 2024-12-18 DIAGNOSIS — Z96.652 S/P TOTAL KNEE ARTHROPLASTY, LEFT: Primary | ICD-10-CM

## 2024-12-18 PROCEDURE — 97110 THERAPEUTIC EXERCISES: CPT

## 2024-12-18 PROCEDURE — 97530 THERAPEUTIC ACTIVITIES: CPT

## 2024-12-18 PROCEDURE — 97112 NEUROMUSCULAR REEDUCATION: CPT

## 2024-12-18 NOTE — PROGRESS NOTES
Daily Note     Today's date: 2024  Patient name: Noris Heck  : 1955  MRN: [id number]  Referring provider: Miracle Yepez MD  Dx:   Encounter Diagnosis     ICD-10-CM    1. S/P total knee arthroplasty, left  Z96.652                      Subjective: Patient returned to surgeon who states she is doing well. She would like to be done with formal PT today.       Objective: See treatment diary below      Assessment: Continued with outlined program. Patient notes some pain on the outside of her knee, updated strengthening exercises to focus on strengthening as she wishes to discharge today.       Plan: Discharge to HEP.                  POC Expires Auth Status Start Date Expiration Date PT Visit Limit       No auth required         Date             Used             Remaining                Diagnosis: L knee TRK 10/7/24   Precautions:    Primary Goals: 1) L knee ROM  2) L knee strength  3) Hip strength   *asterisks by exercise = given for HEP   Manuals    Tibiofemoral distraction         PROM   KK, PTA  IM, * KK, * KK, *    IASTM                          There Ex        NuStep/Bike for ROM Recumbent bike for ROM 5 min  Recumbent bike for ROM 5 min Recumbent bike for ROM 5 min Recumbent bike for ROM 5 min Recumbent bike for ROM 5 min    Heel slides* 5 min  5 min 104* 5 min 105* 3 min 109*  3 min 103*    Seated heel slides        HS S  10x10 sec with OP      Quad S*        Calf S        ITB S         Biodex step knee flex S  10x10 sec  10x10 sec  10x10 sec  10x10 sec    Seated EOT S                         Neuro Re-Ed        Quad set*        SLR*  2# 3x10 cuff weight  2# 3x10 cuff weight    SL SLR        Clam*        Reverse clam        Bridge        Standing 3 way SLR  2# 2x10 ea lb  2# 2x10 ea lb    Leg press Seat 3 110# 4x10  Seat 2 90# 5x10   Seat 1 90# 5x10  100# 2x10  Seat 1   100# 4x10    LAQ 5# 2x10         HS curls standing        SLS   30 sec  x2  30 sec x2              Patient education         Re-evaluation         Ther Act        Sit to stands 2x10        Step ups* 8in 2x10 fwd(biodex step)     2x10 lateral  8in 2x10 fwd (biodex step)    Lateral NV   8in 2x10 fwd biodex step    10x lateral  8in 2x10 fwd biodex step     10x lateral    Modalities        Ice

## 2024-12-23 ENCOUNTER — APPOINTMENT (OUTPATIENT)
Dept: PHYSICAL THERAPY | Facility: CLINIC | Age: 69
End: 2024-12-23
Payer: COMMERCIAL

## 2024-12-26 ENCOUNTER — APPOINTMENT (OUTPATIENT)
Dept: PHYSICAL THERAPY | Facility: CLINIC | Age: 69
End: 2024-12-26
Payer: COMMERCIAL

## 2025-01-30 ENCOUNTER — ESTABLISHED COMPREHENSIVE EXAM (OUTPATIENT)
Dept: URBAN - METROPOLITAN AREA CLINIC 6 | Facility: CLINIC | Age: 70
End: 2025-01-30

## 2025-01-30 DIAGNOSIS — H25.812: ICD-10-CM

## 2025-01-30 DIAGNOSIS — H40.013: ICD-10-CM

## 2025-01-30 DIAGNOSIS — D23.112: ICD-10-CM

## 2025-01-30 DIAGNOSIS — H02.831: ICD-10-CM

## 2025-01-30 DIAGNOSIS — H02.834: ICD-10-CM

## 2025-01-30 PROCEDURE — 92014 COMPRE OPH EXAM EST PT 1/>: CPT

## 2025-01-30 PROCEDURE — 92133 CPTRZD OPH DX IMG PST SGM ON: CPT

## 2025-01-30 ASSESSMENT — TONOMETRY
OS_IOP_MMHG: 14
OD_IOP_MMHG: 14

## 2025-01-30 ASSESSMENT — VISUAL ACUITY
OD_CC: 20/20-2
OS_CC: 20/25+1

## (undated) DEVICE — HOOD: Brand: T7PLUS

## (undated) DEVICE — ABDOMINAL PAD: Brand: DERMACEA

## (undated) DEVICE — SUT VICRYL 2-0 CT-1 27 IN J259H

## (undated) DEVICE — SUT VICRYL 1 CT-1 27 IN J261H

## (undated) DEVICE — SYRINGE 30ML LL

## (undated) DEVICE — PAD GROUNDING DUAL ADULT

## (undated) DEVICE — PAD CAST 4 IN COTTON NON STERILE

## (undated) DEVICE — NEEDLE 25G X 1 1/2

## (undated) DEVICE — RECIPROCATING BLADE, DOUBLE SIDED, OFFSET  (70.0 X 0.64 X 12.6MM)

## (undated) DEVICE — CAPIT KNEE ATTUNE FB W/DOM

## (undated) DEVICE — BETHLEHEM UNIV TOTAL KNEE, KIT: Brand: CARDINAL HEALTH

## (undated) DEVICE — ACE WRAP 6 IN STERILE

## (undated) DEVICE — 3 BONE CEMENT MIXER: Brand: MIXEVAC

## (undated) DEVICE — GAUZE SPONGES,16 PLY: Brand: CURITY

## (undated) DEVICE — PAD CAST 6 IN COTTON NON STERILE

## (undated) DEVICE — HANDPIECE SET WITH RETRACTABLE COAXIAL FAN SPRAY TIP AND SUCTION TUBE: Brand: INTERPULSE

## (undated) DEVICE — GLOVE INDICATOR PI UNDERGLOVE SZ 8.5 BLUE

## (undated) DEVICE — SPONGE SCRUB 4 PCT CHLORHEXIDINE

## (undated) DEVICE — SKN PRP WNG SPNGE PVP SCRB STR: Brand: MEDLINE INDUSTRIES, INC.

## (undated) DEVICE — ARTHROSCOPY FLOOR MAT

## (undated) DEVICE — PENCIL ELECTROSURG E-Z CLEAN -0035H

## (undated) DEVICE — SILVER-COATED ANTIMICROBIAL BARRIER DRESSING: Brand: ACTICOAT   4" X 8"

## (undated) DEVICE — PADDING CAST 4 IN  COTTON STRL

## (undated) DEVICE — GLOVE SRG BIOGEL 8

## (undated) DEVICE — COOL TEMP PAD

## (undated) DEVICE — HOOD WITH PEEL AWAY FACE SHIELD: Brand: T7PLUS

## (undated) DEVICE — NEEDLE 18 G X 1 1/2 SAFETY

## (undated) DEVICE — DUAL CUT SAGITTAL BLADE

## (undated) DEVICE — TRAY FOLEY 16FR URIMETER SURESTEP